# Patient Record
Sex: MALE | Race: WHITE | NOT HISPANIC OR LATINO | Employment: FULL TIME | URBAN - NONMETROPOLITAN AREA
[De-identification: names, ages, dates, MRNs, and addresses within clinical notes are randomized per-mention and may not be internally consistent; named-entity substitution may affect disease eponyms.]

---

## 2023-12-09 ENCOUNTER — HOSPITAL ENCOUNTER (EMERGENCY)
Facility: HOSPITAL | Age: 58
Discharge: OTHER INSTITUTION | End: 2023-12-09
Attending: EMERGENCY MEDICINE
Payer: COMMERCIAL

## 2023-12-09 ENCOUNTER — APPOINTMENT (EMERGENCY)
Dept: RADIOLOGY | Facility: HOSPITAL | Age: 58
End: 2023-12-09
Payer: COMMERCIAL

## 2023-12-09 VITALS
BODY MASS INDEX: 32.18 KG/M2 | OXYGEN SATURATION: 97 % | DIASTOLIC BLOOD PRESSURE: 85 MMHG | SYSTOLIC BLOOD PRESSURE: 128 MMHG | HEART RATE: 88 BPM | RESPIRATION RATE: 15 BRPM | TEMPERATURE: 97.6 F | WEIGHT: 205.03 LBS | HEIGHT: 67 IN

## 2023-12-09 DIAGNOSIS — R07.9 CHEST PAIN, UNSPECIFIED: ICD-10-CM

## 2023-12-09 DIAGNOSIS — R73.9 HYPERGLYCEMIA: ICD-10-CM

## 2023-12-09 DIAGNOSIS — I21.3 STEMI (ST ELEVATION MYOCARDIAL INFARCTION) (HCC): Primary | ICD-10-CM

## 2023-12-09 DIAGNOSIS — I10 HYPERTENSION: ICD-10-CM

## 2023-12-09 PROBLEM — E66.9 OBESITY: Status: ACTIVE | Noted: 2023-12-09

## 2023-12-09 LAB
2HR DELTA HS TROPONIN: 1199 NG/L
ALBUMIN SERPL BCP-MCNC: 4.7 G/DL (ref 3.5–5)
ALP SERPL-CCNC: 82 U/L (ref 34–104)
ALT SERPL W P-5'-P-CCNC: 46 U/L (ref 7–52)
ANION GAP SERPL CALCULATED.3IONS-SCNC: 13 MMOL/L
APTT PPP: 23 SECONDS (ref 23–37)
AST SERPL W P-5'-P-CCNC: 33 U/L (ref 13–39)
BASOPHILS # BLD AUTO: 0.03 THOUSANDS/ÂΜL (ref 0–0.1)
BASOPHILS NFR BLD AUTO: 0 % (ref 0–1)
BILIRUB SERPL-MCNC: 0.81 MG/DL (ref 0.2–1)
BUN SERPL-MCNC: 17 MG/DL (ref 5–25)
CALCIUM SERPL-MCNC: 9.8 MG/DL (ref 8.4–10.2)
CARDIAC TROPONIN I PNL SERPL HS: 1348 NG/L
CARDIAC TROPONIN I PNL SERPL HS: 149 NG/L
CHLORIDE SERPL-SCNC: 96 MMOL/L (ref 96–108)
CO2 SERPL-SCNC: 24 MMOL/L (ref 21–32)
CREAT SERPL-MCNC: 1.27 MG/DL (ref 0.6–1.3)
EOSINOPHIL # BLD AUTO: 0.01 THOUSAND/ÂΜL (ref 0–0.61)
EOSINOPHIL NFR BLD AUTO: 0 % (ref 0–6)
ERYTHROCYTE [DISTWIDTH] IN BLOOD BY AUTOMATED COUNT: 12.4 % (ref 11.6–15.1)
GFR SERPL CREATININE-BSD FRML MDRD: 61 ML/MIN/1.73SQ M
GLUCOSE SERPL-MCNC: 407 MG/DL (ref 65–140)
GLUCOSE SERPL-MCNC: 445 MG/DL (ref 65–140)
HCT VFR BLD AUTO: 45.5 % (ref 36.5–49.3)
HGB BLD-MCNC: 15.6 G/DL (ref 12–17)
IMM GRANULOCYTES # BLD AUTO: 0.04 THOUSAND/UL (ref 0–0.2)
IMM GRANULOCYTES NFR BLD AUTO: 1 % (ref 0–2)
INR PPP: 0.92 (ref 0.84–1.19)
LYMPHOCYTES # BLD AUTO: 0.88 THOUSANDS/ÂΜL (ref 0.6–4.47)
LYMPHOCYTES NFR BLD AUTO: 12 % (ref 14–44)
MCH RBC QN AUTO: 28.8 PG (ref 26.8–34.3)
MCHC RBC AUTO-ENTMCNC: 34.3 G/DL (ref 31.4–37.4)
MCV RBC AUTO: 84 FL (ref 82–98)
MONOCYTES # BLD AUTO: 0.4 THOUSAND/ÂΜL (ref 0.17–1.22)
MONOCYTES NFR BLD AUTO: 5 % (ref 4–12)
NEUTROPHILS # BLD AUTO: 6.15 THOUSANDS/ÂΜL (ref 1.85–7.62)
NEUTS SEG NFR BLD AUTO: 82 % (ref 43–75)
NRBC BLD AUTO-RTO: 0 /100 WBCS
PLATELET # BLD AUTO: 198 THOUSANDS/UL (ref 149–390)
PMV BLD AUTO: 9.9 FL (ref 8.9–12.7)
POTASSIUM SERPL-SCNC: 4.4 MMOL/L (ref 3.5–5.3)
PROT SERPL-MCNC: 7.5 G/DL (ref 6.4–8.4)
PROTHROMBIN TIME: 12.7 SECONDS (ref 11.6–14.5)
RBC # BLD AUTO: 5.41 MILLION/UL (ref 3.88–5.62)
SODIUM SERPL-SCNC: 133 MMOL/L (ref 135–147)
WBC # BLD AUTO: 7.51 THOUSAND/UL (ref 4.31–10.16)

## 2023-12-09 PROCEDURE — 85730 THROMBOPLASTIN TIME PARTIAL: CPT | Performed by: EMERGENCY MEDICINE

## 2023-12-09 PROCEDURE — 93005 ELECTROCARDIOGRAM TRACING: CPT

## 2023-12-09 PROCEDURE — 99285 EMERGENCY DEPT VISIT HI MDM: CPT

## 2023-12-09 PROCEDURE — 85610 PROTHROMBIN TIME: CPT | Performed by: EMERGENCY MEDICINE

## 2023-12-09 PROCEDURE — 96374 THER/PROPH/DIAG INJ IV PUSH: CPT

## 2023-12-09 PROCEDURE — 96375 TX/PRO/DX INJ NEW DRUG ADDON: CPT

## 2023-12-09 PROCEDURE — 36415 COLL VENOUS BLD VENIPUNCTURE: CPT | Performed by: EMERGENCY MEDICINE

## 2023-12-09 PROCEDURE — 96361 HYDRATE IV INFUSION ADD-ON: CPT

## 2023-12-09 PROCEDURE — 85025 COMPLETE CBC W/AUTO DIFF WBC: CPT | Performed by: EMERGENCY MEDICINE

## 2023-12-09 PROCEDURE — 80053 COMPREHEN METABOLIC PANEL: CPT | Performed by: EMERGENCY MEDICINE

## 2023-12-09 PROCEDURE — 71045 X-RAY EXAM CHEST 1 VIEW: CPT

## 2023-12-09 PROCEDURE — 82948 REAGENT STRIP/BLOOD GLUCOSE: CPT

## 2023-12-09 PROCEDURE — 84484 ASSAY OF TROPONIN QUANT: CPT | Performed by: EMERGENCY MEDICINE

## 2023-12-09 PROCEDURE — 99291 CRITICAL CARE FIRST HOUR: CPT | Performed by: EMERGENCY MEDICINE

## 2023-12-09 RX ORDER — HEPARIN SODIUM 1000 [USP'U]/ML
4000 INJECTION, SOLUTION INTRAVENOUS; SUBCUTANEOUS ONCE
Status: COMPLETED | OUTPATIENT
Start: 2023-12-09 | End: 2023-12-09

## 2023-12-09 RX ORDER — SODIUM CHLORIDE 9 MG/ML
INJECTION, SOLUTION INTRAVENOUS
Status: COMPLETED | OUTPATIENT
Start: 2023-12-09 | End: 2023-12-09

## 2023-12-09 RX ORDER — FENTANYL CITRATE 50 UG/ML
100 INJECTION, SOLUTION INTRAMUSCULAR; INTRAVENOUS ONCE
Status: COMPLETED | OUTPATIENT
Start: 2023-12-09 | End: 2023-12-09

## 2023-12-09 RX ORDER — MORPHINE SULFATE 4 MG/ML
4 INJECTION, SOLUTION INTRAMUSCULAR; INTRAVENOUS ONCE
Status: COMPLETED | OUTPATIENT
Start: 2023-12-09 | End: 2023-12-09

## 2023-12-09 RX ORDER — NITROGLYCERIN 0.4 MG/1
0.4 TABLET SUBLINGUAL
Status: DISCONTINUED | OUTPATIENT
Start: 2023-12-09 | End: 2023-12-10 | Stop reason: HOSPADM

## 2023-12-09 RX ORDER — SODIUM CHLORIDE 9 MG/ML
3 INJECTION INTRAVENOUS
Status: DISCONTINUED | OUTPATIENT
Start: 2023-12-09 | End: 2023-12-10 | Stop reason: HOSPADM

## 2023-12-09 RX ORDER — ONDANSETRON 2 MG/ML
4 INJECTION INTRAMUSCULAR; INTRAVENOUS ONCE
Status: COMPLETED | OUTPATIENT
Start: 2023-12-09 | End: 2023-12-09

## 2023-12-09 RX ORDER — ASPIRIN 81 MG/1
324 TABLET, CHEWABLE ORAL ONCE
Status: COMPLETED | OUTPATIENT
Start: 2023-12-09 | End: 2023-12-09

## 2023-12-09 RX ADMIN — NITROGLYCERIN 0.4 MG: 0.4 TABLET SUBLINGUAL at 21:48

## 2023-12-09 RX ADMIN — ASPIRIN 81 MG CHEWABLE TABLET 324 MG: 81 TABLET CHEWABLE at 21:04

## 2023-12-09 RX ADMIN — ONDANSETRON 4 MG: 2 INJECTION INTRAMUSCULAR; INTRAVENOUS at 21:23

## 2023-12-09 RX ADMIN — TICAGRELOR 180 MG: 90 TABLET ORAL at 21:04

## 2023-12-09 RX ADMIN — SODIUM CHLORIDE 1000 ML: 0.9 INJECTION, SOLUTION INTRAVENOUS at 21:25

## 2023-12-09 RX ADMIN — NITROGLYCERIN 0.4 MG: 0.4 TABLET SUBLINGUAL at 21:04

## 2023-12-09 RX ADMIN — HEPARIN SODIUM 4000 UNITS: 1000 INJECTION INTRAVENOUS; SUBCUTANEOUS at 21:03

## 2023-12-09 RX ADMIN — Medication 50 MG: at 22:07

## 2023-12-09 RX ADMIN — FENTANYL CITRATE 100 MCG: 50 INJECTION INTRAMUSCULAR; INTRAVENOUS at 21:28

## 2023-12-09 RX ADMIN — NITROGLYCERIN 0.4 MG: 0.4 TABLET SUBLINGUAL at 21:08

## 2023-12-09 RX ADMIN — MORPHINE SULFATE 4 MG: 4 INJECTION INTRAVENOUS at 21:18

## 2023-12-10 ENCOUNTER — HOSPITAL ENCOUNTER (INPATIENT)
Facility: HOSPITAL | Age: 58
LOS: 2 days | Discharge: HOME | DRG: 281 | End: 2023-12-12
Attending: EMERGENCY MEDICINE | Admitting: EMERGENCY MEDICINE
Payer: COMMERCIAL

## 2023-12-10 ENCOUNTER — APPOINTMENT (INPATIENT)
Dept: NON INVASIVE DIAGNOSTICS | Facility: HOSPITAL | Age: 58
DRG: 281 | End: 2023-12-10
Payer: COMMERCIAL

## 2023-12-10 DIAGNOSIS — I21.3 STEMI (ST ELEVATION MYOCARDIAL INFARCTION) (HCC): Primary | ICD-10-CM

## 2023-12-10 DIAGNOSIS — E11.69 TYPE 2 DIABETES MELLITUS WITH OTHER SPECIFIED COMPLICATION, WITHOUT LONG-TERM CURRENT USE OF INSULIN (HCC): ICD-10-CM

## 2023-12-10 DIAGNOSIS — E11.9 NEWLY DIAGNOSED DIABETES (HCC): ICD-10-CM

## 2023-12-10 PROBLEM — R74.01 TRANSAMINITIS: Status: ACTIVE | Noted: 2023-12-10

## 2023-12-10 LAB
ALBUMIN SERPL BCP-MCNC: 3.8 G/DL (ref 3.5–5)
ALP SERPL-CCNC: 67 U/L (ref 34–104)
ALT SERPL W P-5'-P-CCNC: 62 U/L (ref 7–52)
ANION GAP SERPL CALCULATED.3IONS-SCNC: 6 MMOL/L
ANION GAP SERPL CALCULATED.3IONS-SCNC: 6 MMOL/L
AORTIC ROOT: 3.4 CM
APICAL FOUR CHAMBER EJECTION FRACTION: 62 %
APTT PPP: 34 SECONDS (ref 23–37)
ASCENDING AORTA: 3.2 CM
AST SERPL W P-5'-P-CCNC: 165 U/L (ref 13–39)
ATRIAL RATE: 75 BPM
ATRIAL RATE: 99 BPM
BASOPHILS # BLD AUTO: 0.02 THOUSANDS/ÂΜL (ref 0–0.1)
BASOPHILS NFR BLD AUTO: 0 % (ref 0–1)
BILIRUB SERPL-MCNC: 0.66 MG/DL (ref 0.2–1)
BUN SERPL-MCNC: 16 MG/DL (ref 5–25)
BUN SERPL-MCNC: 17 MG/DL (ref 5–25)
CA-I BLD-SCNC: 1.15 MMOL/L (ref 1.12–1.32)
CALCIUM SERPL-MCNC: 8.7 MG/DL (ref 8.4–10.2)
CALCIUM SERPL-MCNC: 8.8 MG/DL (ref 8.4–10.2)
CARDIAC TROPONIN I PNL SERPL HS: ABNORMAL NG/L
CHLORIDE SERPL-SCNC: 101 MMOL/L (ref 96–108)
CHLORIDE SERPL-SCNC: 104 MMOL/L (ref 96–108)
CHOLEST SERPL-MCNC: 209 MG/DL
CO2 SERPL-SCNC: 28 MMOL/L (ref 21–32)
CO2 SERPL-SCNC: 29 MMOL/L (ref 21–32)
CREAT SERPL-MCNC: 0.97 MG/DL (ref 0.6–1.3)
CREAT SERPL-MCNC: 1.1 MG/DL (ref 0.6–1.3)
E WAVE DECELERATION TIME: 212 MS
E/A RATIO: 0.89
EOSINOPHIL # BLD AUTO: 0 THOUSAND/ÂΜL (ref 0–0.61)
EOSINOPHIL NFR BLD AUTO: 0 % (ref 0–6)
ERYTHROCYTE [DISTWIDTH] IN BLOOD BY AUTOMATED COUNT: 12.7 % (ref 11.6–15.1)
EST. AVERAGE GLUCOSE BLD GHB EST-MCNC: 355 MG/DL
FRACTIONAL SHORTENING: 29 (ref 28–44)
GFR SERPL CREATININE-BSD FRML MDRD: 73 ML/MIN/1.73SQ M
GFR SERPL CREATININE-BSD FRML MDRD: 85 ML/MIN/1.73SQ M
GLUCOSE SERPL-MCNC: 108 MG/DL (ref 65–140)
GLUCOSE SERPL-MCNC: 137 MG/DL (ref 65–140)
GLUCOSE SERPL-MCNC: 140 MG/DL (ref 65–140)
GLUCOSE SERPL-MCNC: 146 MG/DL (ref 65–140)
GLUCOSE SERPL-MCNC: 154 MG/DL (ref 65–140)
GLUCOSE SERPL-MCNC: 170 MG/DL (ref 65–140)
GLUCOSE SERPL-MCNC: 189 MG/DL (ref 65–140)
GLUCOSE SERPL-MCNC: 219 MG/DL (ref 65–140)
GLUCOSE SERPL-MCNC: 237 MG/DL (ref 65–140)
GLUCOSE SERPL-MCNC: 283 MG/DL (ref 65–140)
GLUCOSE SERPL-MCNC: 294 MG/DL (ref 65–140)
GLUCOSE SERPL-MCNC: 336 MG/DL (ref 65–140)
GLUCOSE SERPL-MCNC: 368 MG/DL (ref 65–140)
GLUCOSE SERPL-MCNC: 423 MG/DL (ref 65–140)
HBA1C MFR BLD: 14 %
HCT VFR BLD AUTO: 39.1 % (ref 36.5–49.3)
HDLC SERPL-MCNC: 35 MG/DL
HGB BLD-MCNC: 13.5 G/DL (ref 12–17)
IMM GRANULOCYTES # BLD AUTO: 0.02 THOUSAND/UL (ref 0–0.2)
IMM GRANULOCYTES NFR BLD AUTO: 0 % (ref 0–2)
INTERVENTRICULAR SEPTUM IN DIASTOLE (PARASTERNAL SHORT AXIS VIEW): 0.9 CM
INTERVENTRICULAR SEPTUM: 0.9 CM (ref 0.6–1.1)
LAAS-AP2: 19 CM2
LAAS-AP4: 18.5 CM2
LDLC SERPL CALC-MCNC: 113 MG/DL (ref 0–100)
LEFT ATRIUM SIZE: 3.3 CM
LEFT ATRIUM VOLUME (MOD BIPLANE): 52 ML
LEFT ATRIUM VOLUME INDEX (MOD BIPLANE): 25.5 ML/M2
LEFT INTERNAL DIMENSION IN SYSTOLE: 3.4 CM (ref 2.1–4)
LEFT VENTRICULAR INTERNAL DIMENSION IN DIASTOLE: 4.8 CM (ref 3.5–6)
LEFT VENTRICULAR POSTERIOR WALL IN END DIASTOLE: 1 CM
LEFT VENTRICULAR STROKE VOLUME: 61 ML
LVSV (TEICH): 61 ML
LYMPHOCYTES # BLD AUTO: 0.94 THOUSANDS/ÂΜL (ref 0.6–4.47)
LYMPHOCYTES NFR BLD AUTO: 11 % (ref 14–44)
MAGNESIUM SERPL-MCNC: 1.9 MG/DL (ref 1.9–2.7)
MAGNESIUM SERPL-MCNC: 2.1 MG/DL (ref 1.9–2.7)
MCH RBC QN AUTO: 29.8 PG (ref 26.8–34.3)
MCHC RBC AUTO-ENTMCNC: 34.5 G/DL (ref 31.4–37.4)
MCV RBC AUTO: 86 FL (ref 82–98)
MONOCYTES # BLD AUTO: 0.58 THOUSAND/ÂΜL (ref 0.17–1.22)
MONOCYTES NFR BLD AUTO: 7 % (ref 4–12)
MV E'TISSUE VEL-SEP: 6 CM/S
MV PEAK A VEL: 0.72 M/S
MV PEAK E VEL: 64 CM/S
MV STENOSIS PRESSURE HALF TIME: 61 MS
MV VALVE AREA P 1/2 METHOD: 3.61
NEUTROPHILS # BLD AUTO: 6.76 THOUSANDS/ÂΜL (ref 1.85–7.62)
NEUTS SEG NFR BLD AUTO: 82 % (ref 43–75)
NRBC BLD AUTO-RTO: 0 /100 WBCS
P AXIS: 65 DEGREES
P AXIS: 65 DEGREES
PHOSPHATE SERPL-MCNC: 4.6 MG/DL (ref 2.7–4.5)
PLATELET # BLD AUTO: 189 THOUSANDS/UL (ref 149–390)
PMV BLD AUTO: 9.7 FL (ref 8.9–12.7)
POTASSIUM SERPL-SCNC: 3.2 MMOL/L (ref 3.5–5.3)
POTASSIUM SERPL-SCNC: 4.5 MMOL/L (ref 3.5–5.3)
PR INTERVAL: 154 MS
PR INTERVAL: 156 MS
PROT SERPL-MCNC: 6 G/DL (ref 6.4–8.4)
QRS AXIS: -54 DEGREES
QRS AXIS: -60 DEGREES
QRSD INTERVAL: 78 MS
QRSD INTERVAL: 88 MS
QT INTERVAL: 342 MS
QT INTERVAL: 362 MS
QTC INTERVAL: 404 MS
QTC INTERVAL: 438 MS
RBC # BLD AUTO: 4.53 MILLION/UL (ref 3.88–5.62)
RIGHT ATRIUM AREA SYSTOLE A4C: 15.6 CM2
RIGHT VENTRICLE ID DIMENSION: 3.7 CM
SL CV LEFT ATRIUM LENGTH A2C: 5.4 CM
SL CV LV EF: 55
SL CV PED ECHO LEFT VENTRICLE DIASTOLIC VOLUME (MOD BIPLANE) 2D: 108 ML
SL CV PED ECHO LEFT VENTRICLE SYSTOLIC VOLUME (MOD BIPLANE) 2D: 47 ML
SODIUM SERPL-SCNC: 135 MMOL/L (ref 135–147)
SODIUM SERPL-SCNC: 139 MMOL/L (ref 135–147)
T WAVE AXIS: 111 DEGREES
T WAVE AXIS: 88 DEGREES
TR MAX PG: 22 MMHG
TR PEAK VELOCITY: 2.3 M/S
TRICUSPID VALVE PEAK REGURGITATION VELOCITY: 2.33 M/S
TRIGL SERPL-MCNC: 306 MG/DL
VENTRICULAR RATE: 75 BPM
VENTRICULAR RATE: 99 BPM
WBC # BLD AUTO: 8.32 THOUSAND/UL (ref 4.31–10.16)

## 2023-12-10 PROCEDURE — 99223 1ST HOSP IP/OBS HIGH 75: CPT | Performed by: INTERNAL MEDICINE

## 2023-12-10 PROCEDURE — 80061 LIPID PANEL: CPT | Performed by: PHYSICIAN ASSISTANT

## 2023-12-10 PROCEDURE — 85025 COMPLETE CBC W/AUTO DIFF WBC: CPT | Performed by: PHYSICIAN ASSISTANT

## 2023-12-10 PROCEDURE — 80053 COMPREHEN METABOLIC PANEL: CPT | Performed by: PHYSICIAN ASSISTANT

## 2023-12-10 PROCEDURE — 83735 ASSAY OF MAGNESIUM: CPT | Performed by: PHYSICIAN ASSISTANT

## 2023-12-10 PROCEDURE — 83036 HEMOGLOBIN GLYCOSYLATED A1C: CPT | Performed by: PHYSICIAN ASSISTANT

## 2023-12-10 PROCEDURE — 99223 1ST HOSP IP/OBS HIGH 75: CPT | Performed by: EMERGENCY MEDICINE

## 2023-12-10 PROCEDURE — 80048 BASIC METABOLIC PNL TOTAL CA: CPT | Performed by: PHYSICIAN ASSISTANT

## 2023-12-10 PROCEDURE — 93005 ELECTROCARDIOGRAM TRACING: CPT

## 2023-12-10 PROCEDURE — 82948 REAGENT STRIP/BLOOD GLUCOSE: CPT

## 2023-12-10 PROCEDURE — 84100 ASSAY OF PHOSPHORUS: CPT | Performed by: PHYSICIAN ASSISTANT

## 2023-12-10 PROCEDURE — 82330 ASSAY OF CALCIUM: CPT | Performed by: PHYSICIAN ASSISTANT

## 2023-12-10 PROCEDURE — 84484 ASSAY OF TROPONIN QUANT: CPT | Performed by: PHYSICIAN ASSISTANT

## 2023-12-10 PROCEDURE — 93306 TTE W/DOPPLER COMPLETE: CPT | Performed by: INTERNAL MEDICINE

## 2023-12-10 PROCEDURE — 85730 THROMBOPLASTIN TIME PARTIAL: CPT | Performed by: INTERNAL MEDICINE

## 2023-12-10 PROCEDURE — 93306 TTE W/DOPPLER COMPLETE: CPT

## 2023-12-10 RX ORDER — POTASSIUM CHLORIDE 20 MEQ/1
40 TABLET, EXTENDED RELEASE ORAL ONCE
Status: COMPLETED | OUTPATIENT
Start: 2023-12-10 | End: 2023-12-10

## 2023-12-10 RX ORDER — PANTOPRAZOLE SODIUM 20 MG/1
20 TABLET, DELAYED RELEASE ORAL
Status: DISCONTINUED | OUTPATIENT
Start: 2023-12-10 | End: 2023-12-12 | Stop reason: HOSPADM

## 2023-12-10 RX ORDER — ONDANSETRON 2 MG/ML
4 INJECTION INTRAMUSCULAR; INTRAVENOUS EVERY 6 HOURS PRN
Status: DISCONTINUED | OUTPATIENT
Start: 2023-12-10 | End: 2023-12-12 | Stop reason: HOSPADM

## 2023-12-10 RX ORDER — CHLORHEXIDINE GLUCONATE ORAL RINSE 1.2 MG/ML
15 SOLUTION DENTAL EVERY 12 HOURS SCHEDULED
Status: DISCONTINUED | OUTPATIENT
Start: 2023-12-10 | End: 2023-12-12 | Stop reason: HOSPADM

## 2023-12-10 RX ORDER — HEPARIN SODIUM 10000 [USP'U]/100ML
3-20 INJECTION, SOLUTION INTRAVENOUS
Status: DISCONTINUED | OUTPATIENT
Start: 2023-12-10 | End: 2023-12-11

## 2023-12-10 RX ORDER — ATORVASTATIN CALCIUM 80 MG/1
80 TABLET, FILM COATED ORAL
Status: DISCONTINUED | OUTPATIENT
Start: 2023-12-10 | End: 2023-12-12 | Stop reason: HOSPADM

## 2023-12-10 RX ORDER — ACETAMINOPHEN 325 MG/1
650 TABLET ORAL EVERY 6 HOURS PRN
Status: DISCONTINUED | OUTPATIENT
Start: 2023-12-10 | End: 2023-12-12 | Stop reason: HOSPADM

## 2023-12-10 RX ORDER — MAGNESIUM SULFATE HEPTAHYDRATE 40 MG/ML
2 INJECTION, SOLUTION INTRAVENOUS ONCE
Status: COMPLETED | OUTPATIENT
Start: 2023-12-10 | End: 2023-12-10

## 2023-12-10 RX ORDER — HEPARIN SODIUM 1000 [USP'U]/ML
4000 INJECTION, SOLUTION INTRAVENOUS; SUBCUTANEOUS EVERY 6 HOURS PRN
Status: DISCONTINUED | OUTPATIENT
Start: 2023-12-10 | End: 2023-12-11

## 2023-12-10 RX ORDER — HEPARIN SODIUM 1000 [USP'U]/ML
2000 INJECTION, SOLUTION INTRAVENOUS; SUBCUTANEOUS EVERY 6 HOURS PRN
Status: DISCONTINUED | OUTPATIENT
Start: 2023-12-10 | End: 2023-12-11

## 2023-12-10 RX ORDER — HEPARIN SODIUM 1000 [USP'U]/ML
4000 INJECTION, SOLUTION INTRAVENOUS; SUBCUTANEOUS ONCE
Status: COMPLETED | OUTPATIENT
Start: 2023-12-10 | End: 2023-12-10

## 2023-12-10 RX ORDER — AMOXICILLIN 250 MG
2 CAPSULE ORAL 2 TIMES DAILY
Status: DISCONTINUED | OUTPATIENT
Start: 2023-12-10 | End: 2023-12-12 | Stop reason: HOSPADM

## 2023-12-10 RX ORDER — POTASSIUM CHLORIDE 14.9 MG/ML
20 INJECTION INTRAVENOUS ONCE
Status: COMPLETED | OUTPATIENT
Start: 2023-12-10 | End: 2023-12-10

## 2023-12-10 RX ADMIN — AMIODARONE HYDROCHLORIDE 150 MG: 50 INJECTION, SOLUTION INTRAVENOUS at 08:53

## 2023-12-10 RX ADMIN — Medication 12.5 MG: at 08:47

## 2023-12-10 RX ADMIN — CHLORHEXIDINE GLUCONATE 15 ML: 1.2 SOLUTION ORAL at 01:51

## 2023-12-10 RX ADMIN — HEPARIN SODIUM 4000 UNITS: 1000 INJECTION INTRAVENOUS; SUBCUTANEOUS at 11:35

## 2023-12-10 RX ADMIN — SODIUM CHLORIDE 10 UNITS/HR: 9 INJECTION, SOLUTION INTRAVENOUS at 01:51

## 2023-12-10 RX ADMIN — POTASSIUM CHLORIDE 20 MEQ: 14.9 INJECTION, SOLUTION INTRAVENOUS at 16:04

## 2023-12-10 RX ADMIN — SENNOSIDES, DOCUSATE SODIUM 2 TABLET: 8.6; 5 TABLET ORAL at 08:19

## 2023-12-10 RX ADMIN — TICAGRELOR 90 MG: 90 TABLET ORAL at 21:23

## 2023-12-10 RX ADMIN — POTASSIUM CHLORIDE 40 MEQ: 1500 TABLET, EXTENDED RELEASE ORAL at 16:04

## 2023-12-10 RX ADMIN — HEPARIN SODIUM 11.1 UNITS/KG/HR: 10000 INJECTION, SOLUTION INTRAVENOUS at 11:36

## 2023-12-10 RX ADMIN — CHLORHEXIDINE GLUCONATE 15 ML: 1.2 SOLUTION ORAL at 21:22

## 2023-12-10 RX ADMIN — TICAGRELOR 90 MG: 90 TABLET ORAL at 08:19

## 2023-12-10 RX ADMIN — ASPIRIN 81 MG: 81 TABLET, COATED ORAL at 08:19

## 2023-12-10 RX ADMIN — SENNOSIDES, DOCUSATE SODIUM 2 TABLET: 8.6; 5 TABLET ORAL at 17:53

## 2023-12-10 RX ADMIN — PANTOPRAZOLE SODIUM 20 MG: 20 TABLET, DELAYED RELEASE ORAL at 16:04

## 2023-12-10 RX ADMIN — SODIUM CHLORIDE 4 UNITS/HR: 9 INJECTION, SOLUTION INTRAVENOUS at 12:55

## 2023-12-10 RX ADMIN — HEPARIN SODIUM 4000 UNITS: 1000 INJECTION INTRAVENOUS; SUBCUTANEOUS at 18:56

## 2023-12-10 RX ADMIN — ATORVASTATIN CALCIUM 80 MG: 80 TABLET, FILM COATED ORAL at 16:04

## 2023-12-10 RX ADMIN — MAGNESIUM SULFATE HEPTAHYDRATE 2 G: 40 INJECTION, SOLUTION INTRAVENOUS at 05:20

## 2023-12-10 RX ADMIN — CHLORHEXIDINE GLUCONATE 15 ML: 1.2 SOLUTION ORAL at 08:32

## 2023-12-10 RX ADMIN — ACETAMINOPHEN 650 MG: 325 TABLET, FILM COATED ORAL at 10:17

## 2023-12-10 NOTE — CASE MANAGEMENT
Case Management Assessment & Discharge Planning Note    Patient name Ane Records  Location [address] [place] [address] [address]/[facility] 423-55 MRN [id number]  : 1965 Date 12/10/2023       Current Admission Date: 12/10/2023  Current Admission Diagnosis:STEMI (ST elevation myocardial infarction) [facility])   Patient Active Problem List    Diagnosis Date Noted    Transaminitis 12/10/2023    STEMI (ST elevation myocardial infarction) ([address]) 2023    HTN (hypertension) 2023    Obesity 2023    Hyperglycemia 2023      LOS (days): 0  Geometric Mean LOS (GMLOS) (days):   Days to GMLOS:     OBJECTIVE:    Risk of Unplanned Readmission Score: 9.15         Current admission status: Inpatient       Preferred Pharmacy: No Pharmacies Listed  Primary Care Provider: Arlene Carranza MD    Primary Insurance:   Secondary Insurance:     ASSESSMENT:  [place] Proxies    There are no active Health Care Proxies on file. Advance Directives  Does patient have a Health Care POA?: Yes  Does patient have Advance Directives?: Yes  Advance Directives: Living will  Primary Contact: wife Ronald Montero              Patient Information  Admitted from[de-identified] Home  Mental Status: Alert  During Assessment patient was accompanied by: Spouse  Assessment information provided by[de-identified] Patient  Primary Caregiver: Self  Support Systems: Spouse/significant other, Family members  Home entry access options.  Select all that apply.: Stairs  Number of steps to enter home.: 3  Type of Current Residence: [facility]  Living Arrangements: Lives w/ Spouse/significant other    Activities of Daily Living Prior to Admission  Functional Status: Independent  Completes ADLs independently?: Yes  Ambulates independently?: Yes  Does patient use assisted devices?: No  Does patient currently own DME?: No  Does patient have a history of Outpatient Therapy (PT/OT)?: No  Does the patient have a history of Short-Term Rehab?: No  Does patient have a history of HHC?: No         Patient Information Continued  Income Source: Employed  Does patient have prescription coverage?: Yes  Does patient receive dialysis treatments?: No  Does patient have a history of substance abuse?: No  Does patient have a history of Mental Health Diagnosis?: No         Means of Transportation  Means of Transport to Appts[de-identified] Drives Self      Housing Stability: Not on file   Food Insecurity: Not on file   Transportation Needs: Not on file   Utilities: Not on file       DISCHARGE DETAILS:    Discharge planning discussed with[de-identified] pt  Freedom of Choice: Yes                   Contacts  Patient Contacts: wife Jesus Luong  Relationship to Patient[de-identified] Family  Contact Method: Phone  Phone Number: [phone]  Reason/Outcome: Continuity of Care, Emergency Contact, Discharge Planning                        Treatment Team Recommendation: Home

## 2023-12-10 NOTE — ASSESSMENT & PLAN NOTE
Patient presents to [address] [place] [address] ED with 3 hours of persistent substernal chest pain. Associated with left arm pain.  No SOB, dizziness or palpitations  Found to have inferior ST elevations  MI alert called but due to lack of transpiration, patient was unable to be transferred to cath lab in timely manner  TNK administered with plans for transfer to [facility]  After TNK, patient had relief of his pain    Plan:  ASA and Brilinta  Statin  ECHO  Cardiology consult  Eventual cardiac cath

## 2023-12-10 NOTE — ASSESSMENT & PLAN NOTE
BG >400 on arrival to ED  Check Hgb A1C   Insulin infusion for now  Likely will need home regimen and endocrine consult

## 2023-12-10 NOTE — CONSULTS
Consultation - Cardiology   Frantz Hicks 60 y.o. male MRN: [id number]  Unit/Bed#: [facility] 778-45 Encounter: [id number]        Inpatient consult to Cardiology     Date/Time  12/10/2023 1:07 AM     Performed by  Claudia Beach MD   Authorized by  Arturo Crowe PA-C             History of Present Illness   Physician Requesting Consult: Jj Aparicio DO  Reason for Consult / Principal Problem: STEMI      Assessment:  Principal Problem:    STEMI (ST elevation myocardial infarction) ([address])  Active Problems:    HTN (hypertension)    Obesity    Hyperglycemia      Assessment/ Plan:    Inferior STEMI  Presentation with 3 hours of chest pain onset with exertion (4 episodes in the last week)  No known risk factors, likely undiagnosed DM, HTN, HLD. Quit smoking >20 years back, occasional alcohol use, no family hx of CAD  EKG presenting with ST elevations in inferior leads  TNK due to transportation delay  Post TNK chest pain has completely resolved, EKG with improvement of ST elevations >50% (see below), with Q waves    Hyperlipidemia  hyperglycemia    Plan  Continue aspirin and ticagrelor  Continue atorvastatin 80 mg daily  Transthoracic echocardiogram  Start lopressor 12.5 mg bid  Can start ACEi/ARB post cardiac cath  Given resolution of chest pain, with >50% improvement of ST elevations in post TNK EKG (with Q waves), and hemodynamic stability, no urgent indication for LHC now. He will need LHC during this admission  Hyperglycemia management per primary  Check lipid panel, HBA1C  Check renal functions, electrolytes    HPI: Frantz Hicks is a 60y.o. year old male with no known medical history, missed PCP visit for 2 years, presented initially to [address] [place] [address] with chest pain. States over the last week he had 4 similar episodes. The last episode is when he had returned from hunting today and was walking up the stairs from his basement. He had a sudden, retrosternal pain, severe, lasted >3 hours.  On arrival to [address] [place] [address] ED EKG showed ST elevations in inferior leads. STEMI alert was called, however given delay in transport, TNK was administered with resolution of his pain. Post TNK EKG shows >50% improvement of his ST elevations. Currently patient denies chest pain, shortness of breath, palpitations. No orthopnea, PND, lower extremity swelling. EKG on presentation      Post TNK         Review of Systems   Constitutional: Negative for decreased appetite, fever, malaise/fatigue and weight gain. Eyes: Negative. Cardiovascular:  Positive for chest pain (now resolved). Negative for dyspnea on exertion, leg swelling, near-syncope and orthopnea. Respiratory: Negative. Negative for shortness of breath. Neurological: Negative. All other systems reviewed and are negative. Historical Information   No past medical history on file. No past surgical history on file. Social History     Substance and Sexual Activity   Alcohol Use Not Currently     Social History     Substance and Sexual Activity   Drug Use Never     Social History     Tobacco Use   Smoking Status Never   Smokeless Tobacco Never     Family History: No family history on file. Meds/Allergies   all current active meds have been reviewed  insulin regular (HumuLIN R,NovoLIN R) 1 Units/mL in sodium chloride 0.9 % 100 mL infusion, 0.3-21 Units/hr        No Known Allergies    Objective   Vitals: Blood pressure 126/80, pulse 99, temperature 97.8 °F (36.6 °C), weight 92.3 kg (203 lb 7.8 oz), SpO2 97 %. , Body mass index is 31.87 kg/m².,   Orthostatic Blood Pressures      Flowsheet Row Most Recent Value   Blood Pressure 126/80 filed at 12/10/2023 5972          Systolic (78ZLF), KQJ:882 , Min:126 , HCE:510     Diastolic (00HLA), SAM:54, Min:80, Max:80    No intake or output data in the 24 hours ending 12/10/23 0106    Weight (last 2 days)       Date/Time Weight    12/10/23 0011 92.3 (203.48)            Invasive Devices       Peripheral Intravenous Line  Duration Peripheral IV 12/09/23 Left Antecubital <1 day    Peripheral IV 12/09/23 Right Antecubital <1 day              Drain  Duration             Urethral Catheter 16 Fr. <1 day                        Physical Exam:   Physical Exam  Vitals and nursing note reviewed. Constitutional:       General: He is not in acute distress. Appearance: He is not ill-appearing. HENT:      Head: Normocephalic. Cardiovascular:      Rate and Rhythm: Normal rate and regular rhythm. Pulses: Normal pulses. Heart sounds: No murmur heard. Pulmonary:      Effort: Pulmonary effort is normal.      Breath sounds: No rales. Abdominal:      Palpations: Abdomen is soft. Musculoskeletal:      Cervical back: Normal range of motion. Right lower leg: No edema. Left lower leg: No edema. Skin:     General: Skin is warm. Capillary Refill: Capillary refill takes less than 2 seconds. Neurological:      Mental Status: He is alert and oriented to person, place, and time. Laboratory Results:        CBC with diff:   Results from last 7 days   Lab Units 12/09/23  2102   WBC Thousand/uL 7.51   HEMOGLOBIN g/dL 15.6   HEMATOCRIT % 45.5   MCV fL 84   PLATELETS Thousands/uL 198   RBC Million/uL 5.41   MCH pg 28.8   MCHC g/dL 34.3   RDW % 12.4   MPV fL 9.9   NRBC AUTO /100 WBCs 0         CMP:  Results from last 7 days   Lab Units 12/09/23  2102   POTASSIUM mmol/L 4.4   CHLORIDE mmol/L 96   CO2 mmol/L 24   BUN mg/dL 17   CREATININE mg/dL 1.27   CALCIUM mg/dL 9.8   AST U/L 33   ALT U/L 46   ALK PHOS U/L 82   EGFR ml/min/1.73sq m 61         BMP:  Results from last 7 days   Lab Units 12/09/23  2102   POTASSIUM mmol/L 4.4   CHLORIDE mmol/L 96   CO2 mmol/L 24   BUN mg/dL 17   CREATININE mg/dL 1.27   CALCIUM mg/dL 9.8       BNP:  No results for input(s): "BNP" in the last 72 hours.     Magnesium:       Coags:   Results from last 7 days   Lab Units 12/09/23  2102   PTT seconds 23   INR  0.92       TSH:       Hemoglobin A1C Lipid Profile:         Cardiac testing:   No results found for this or any previous visit. No results found for this or any previous visit. No results found for this or any previous visit. No results found for this or any previous visit. Imaging: I have personally reviewed pertinent reports. No results found.     EKG reviewed personally: ST elevations in Inferior leads  Telemetry reviewed personally: NSR         Code Status: Level 1 - Full Code

## 2023-12-10 NOTE — H&P
[address] [facility]  H&P  Name: Geovani Mandujano 60 y.o. male I MRN: [id number]  Unit/Bed#: [facility] 249-49 I Date of Admission: 12/10/2023   Date of Service: 12/10/2023 I Hospital Day: 0      Assessment/Plan   * STEMI (ST elevation myocardial infarction) [facility])  Assessment & Plan  Patient presents to [facility] ED with 3 hours of persistent substernal chest pain. Associated with left arm pain. No SOB, dizziness or palpitations  Found to have inferior ST elevations  MI alert called but due to lack of transpiration, patient was unable to be transferred to cath lab in timely manner  TNK administered with plans for transfer to [facility]  After TNK, patient had relief of his pain    Plan:  ASA and Brilinta  Statin  ECHO  Cardiology consult  Eventual cardiac cath    HTN (hypertension)  Assessment & Plan  Patient with a history of HTN  Not taking any home medications  Monitor BP and start BB when able  Goal SBP <130    Obesity  Assessment & Plan  Nutrition counseling  BMI >30    Hyperglycemia  Assessment & Plan  BG >400 on arrival to ED  Check Hgb A1C   Insulin infusion for now  Likely will need home regimen and endocrine consult         History of Present Illness     HPI: Geovani Mandujano is a 60 y.o. who presents to [facility] emergency department approximately 3 hours of substernal chest pressure with associated left arm pain. Patient states over the last week has had 4 episodes of substernal chest pressure that typically resolves within 15 minutes. Due to the long duration of this episode, he presented to the emergency department. Patient states at baseline, he is able to perform his activities of daily living including walking up a flight of stairs without chest pain. He denied associated shortness of breath palpitations or dizziness. Patient received fentanyl, morphine and nitroglycerin in the emergency department without relief of his pain.   An MI alert was called but unfortunately transport was unable to be arranged in a timely manner and ultimately the patient was given TNK with resolution of his symptoms. He was transferred to [facility] for cardiac evaluation. The time my examination the patient is resting comfortably in bed without any complaints. History obtained from the patient. Review of Systems   Constitutional:  Negative for diaphoresis. Respiratory:  Negative for shortness of breath. Cardiovascular:  Positive for chest pain. Negative for palpitations. Neurological:  Negative for dizziness. All other systems reviewed and are negative. Disposition: Critical care  Historical Information   No past medical history on file. No past surgical history on file. No current outpatient medications No Known Allergies   Social History     Tobacco Use    Smoking status: Never    Smokeless tobacco: Never   Substance Use Topics    Alcohol use: Not Currently    Drug use: Never    No family history on file. Objective                            Vitals I/O      Most Recent Min/Max in 24hrs   Temp 97.8 °F (36.6 °C) Temp  Min: 97.8 °F (36.6 °C)  Max: 97.8 °F (36.6 °C)   Pulse 99 Pulse  Min: 99  Max: 99   Resp   No data recorded   /80 BP  Min: 126/80  Max: 126/80   O2 Sat 97 % SpO2  Min: 97 %  Max: 97 %    No intake or output data in the 24 hours ending 12/10/23 0038    Diet Clear Liquid    Invasive Monitoring           Physical Exam   Physical Exam  Skin:     General: Skin is warm and dry. Cardiovascular:      Rate and Rhythm: Normal rate and regular rhythm. Heart sounds: No murmur heard. No friction rub. Constitutional:       General: He is awake. He is not in acute distress. Appearance: He is well-developed. Pulmonary:      Effort: Pulmonary effort is normal.      Breath sounds: Normal breath sounds. No wheezing, rhonchi or rales. Neurological:      General: No focal deficit present.       Mental Status: He is alert and oriented to person, place and time. Mental status is at baseline. Genitourinary/Anorectal:  Red present. Diagnostic Studies      EKG: Inferior ST elevations- now resolved.       Medications:  Scheduled PRN   aspirin, 81 mg, Daily  atorvastatin, 80 mg, Daily With Dinner  chlorhexidine, 15 mL, Q12H [address]  senna-docusate sodium, 2 tablet, BID  ticagrelor, 90 mg, Q12H [place]      acetaminophen, 650 mg, Q6H PRN  ondansetron, 4 mg, Q6H PRN       Continuous    insulin regular (HumuLIN R,NovoLIN R) 1 Units/mL in sodium chloride 0.9 % 100 mL infusion, 0.3-21 Units/hr         Labs:    CBC    Recent Labs     12/09/23 2102   WBC 7.51   HGB 15.6   HCT 45.5        BMP    Recent Labs     12/09/23 2102   SODIUM 133*   K 4.4   CL 96   CO2 24   AGAP 13   BUN 17   CREATININE 1.27   CALCIUM 9.8       Coags    Recent Labs     12/09/23 2102   INR 0.92   PTT 23        Additional Electrolytes  No recent results       Blood Gas    No recent results  No recent results LFTs  Recent Labs     12/09/23 2102   ALT 46   AST 33   ALKPHOS 82   ALB 4.7   TBILI 0.81       Infectious  No recent results  Glucose  Recent Labs     12/09/23 2102   GLUC 445*           Anticipated Length of Stay is > 2 midnights  Maria T Gong PA-C

## 2023-12-10 NOTE — ED PROVIDER NOTES
History  Chief Complaint   Patient presents with    Chest Pain     " It feels like theres a baseball hitting my chest". Pt reports this is the 4th episode of chest pain this week       History provided by:  Medical records, patient and spouse  Chest Pain  Pain location:  Substernal area  Pain quality: pressure and tightness    Pain radiates to:  Does not radiate  Pain radiates to the back: no    Pain severity:  Moderate  Onset quality:  Gradual  Duration:  3 hours  Timing:  Constant  Progression:  Unchanged  Chronicity:  New  Context comment:  Patient states he was experiencing central chest pressure 4 times over the past week they would last for about 15 minutes, resolved without intervention. Tonight he had pain that started 3 hours ago and has been constant. Relieved by:  Nothing  Worsened by:  Nothing tried  Ineffective treatments:  None tried  Associated symptoms: no abdominal pain, no back pain, no cough, no dizziness, no dysphagia, no fatigue, no fever, no headache, no nausea, no palpitations, no shortness of breath, not vomiting and no weakness    Risk factors: smoking    Risk factors comment:  History of hypertension, not taking medications, remote smoker quit 26 years ago      None       History reviewed. No pertinent past medical history. History reviewed. No pertinent surgical history. History reviewed. No pertinent family history. I have reviewed and agree with the history as documented. E-Cigarette/Vaping     E-Cigarette/Vaping Substances     Social History     Tobacco Use    Smoking status: Never    Smokeless tobacco: Never   Substance Use Topics    Alcohol use: Not Currently    Drug use: Never       Review of Systems   Constitutional:  Negative for appetite change, chills, fatigue and fever. HENT:  Negative for ear pain, rhinorrhea, sore throat and trouble swallowing. Eyes:  Negative for pain, discharge and visual disturbance.    Respiratory:  Negative for cough, chest tightness and shortness of breath. Cardiovascular:  Positive for chest pain. Negative for palpitations. Gastrointestinal:  Negative for abdominal pain, nausea and vomiting. Endocrine: Negative for polydipsia, polyphagia and polyuria. Genitourinary:  Negative for difficulty urinating, dysuria, hematuria and testicular pain. Musculoskeletal:  Negative for arthralgias and back pain. Skin:  Negative for color change and rash. Allergic/Immunologic: Negative for immunocompromised state. Neurological:  Negative for dizziness, seizures, syncope, weakness and headaches. Hematological:  Negative for adenopathy. Psychiatric/Behavioral:  Negative for confusion and dysphoric mood. All other systems reviewed and are negative. Physical Exam  Physical Exam  Vitals and nursing note reviewed. Constitutional:       General: He is not in acute distress. Appearance: Normal appearance. He is well-developed. He is not ill-appearing, toxic-appearing or diaphoretic. Comments: Anxious appearing   HENT:      Head: Normocephalic and atraumatic. Nose: Nose normal. No congestion or rhinorrhea. Mouth/Throat:      Mouth: Mucous membranes are moist.      Pharynx: Oropharynx is clear. No oropharyngeal exudate or posterior oropharyngeal erythema. Eyes:      General:         Right eye: No discharge. Left eye: No discharge. Cardiovascular:      Rate and Rhythm: Normal rate and regular rhythm. Pulses: Normal pulses. Heart sounds: Normal heart sounds. No murmur heard. No gallop. Pulmonary:      Effort: Pulmonary effort is normal. No respiratory distress. Breath sounds: Normal breath sounds. No stridor. No wheezing, rhonchi or rales. Chest:      Chest wall: No tenderness. Abdominal:      General: Bowel sounds are normal. There is no distension. Palpations: Abdomen is soft. There is no mass. Tenderness: There is no abdominal tenderness.  There is no right CVA tenderness, left CVA tenderness, guarding or rebound. Hernia: No hernia is present. Musculoskeletal:         General: Normal range of motion. Cervical back: Normal range of motion and neck supple. Skin:     General: Skin is warm and dry. Capillary Refill: Capillary refill takes less than 2 seconds. Neurological:      General: No focal deficit present. Mental Status: He is alert and oriented to person, place, and time. Cranial Nerves: No cranial nerve deficit. Sensory: No sensory deficit. Motor: No weakness. Coordination: Coordination normal.      Gait: Gait normal.      Deep Tendon Reflexes: Reflexes normal.   Psychiatric:         Mood and Affect: Mood is anxious. Behavior: Behavior normal.         Thought Content:  Thought content normal.         Judgment: Judgment normal.         Vital Signs  ED Triage Vitals [12/09/23 2053]   Temperature Pulse Respirations Blood Pressure SpO2   97.6 °F (36.4 °C) 77 16 (!) 174/100 100 %      Temp Source Heart Rate Source Patient Position - Orthostatic VS BP Location FiO2 (%)   Temporal Monitor Sitting Left arm --      Pain Score       7           Vitals:    12/09/23 2230 12/09/23 2241 12/09/23 2245 12/09/23 2300   BP: 112/77 112/76 110/74 128/85   Pulse: 80 74 75 88   Patient Position - Orthostatic VS:    Sitting         Visual Acuity      ED Medications  Medications   sodium chloride (PF) 0.9 % injection 3 mL (has no administration in time range)   nitroglycerin (NITROSTAT) SL tablet 0.4 mg (0.4 mg Sublingual Given 12/9/23 2148)   aspirin chewable tablet 324 mg (324 mg Oral Given 12/9/23 2104)   ticagrelor (BRILINTA) tablet 180 mg (180 mg Oral Given 12/9/23 2104)   heparin (porcine) injection 4,000 Units (4,000 Units Intravenous Given 12/9/23 2103)   morphine injection 4 mg (4 mg Intravenous Given 12/9/23 2118)   ondansetron (ZOFRAN) injection 4 mg (4 mg Intravenous Given 12/9/23 2123)   sodium chloride 0.9 % infusion (1,000 mL Intravenous New Bag 12/9/23 2125)   fentanyl citrate (PF) 100 MCG/2ML 100 mcg (100 mcg Intravenous Given 12/9/23 2128)   tenecteplase (TNKase) injection 50 mg (50 mg Intravenous Given 12/9/23 2207)       Diagnostic Studies  Results Reviewed       Procedure Component Value Units Date/Time    HS Troponin I 4hr [[id number]]     Lab Status: No result Specimen: Blood     HS Troponin I 2hr [[id number]]  (Abnormal) Collected: 12/09/23 2230    Lab Status: Final result Specimen: Blood from Arm, Right Updated: 12/09/23 2256     hs TnI 2hr 1,348 ng/L      Delta 2hr hsTnI 1,199 ng/L     HS Troponin 0hr (reflex protocol) [[id number]]  (Abnormal) Collected: 12/09/23 2102    Lab Status: Final result Specimen: Blood from Arm, Right Updated: 12/09/23 2135     hs TnI 0hr 149 ng/L     Fingerstick Glucose (POCT) [[id number]]  (Abnormal) Collected: 12/09/23 2131    Lab Status: Final result Updated: 12/09/23 2133     POC Glucose 407 mg/dl     Comprehensive metabolic panel [[id number]]  (Abnormal) Collected: 12/09/23 2102    Lab Status: Final result Specimen: Blood from Arm, Right Updated: 12/09/23 2127     Sodium 133 mmol/L      Potassium 4.4 mmol/L      Chloride 96 mmol/L      CO2 24 mmol/L      ANION GAP 13 mmol/L      BUN 17 mg/dL      Creatinine 1.27 mg/dL      Glucose 445 mg/dL      Calcium 9.8 mg/dL      AST 33 U/L      ALT 46 U/L      Alkaline Phosphatase 82 U/L      Total Protein 7.5 g/dL      Albumin 4.7 g/dL      Total Bilirubin 0.81 mg/dL      eGFR 61 ml/min/1.73sq m     Narrative:      [place] guidelines for Chronic Kidney Disease (CKD):     Stage 1 with normal or high GFR (GFR > 90 mL/min/1.73 square meters)    Stage 2 Mild CKD (GFR = 60-89 mL/min/1.73 square meters)    Stage 3A Moderate CKD (GFR = 45-59 mL/min/1.73 square meters)    Stage 3B Moderate CKD (GFR = 30-44 mL/min/1.73 square meters)    Stage 4 Severe CKD (GFR = 15-29 mL/min/1.73 square meters)    Stage 5 End Stage CKD (GFR <15 mL/min/1.73 square meters)  Note: GFR calculation is accurate only with a steady state creatinine    APTT [[id number]]  (Normal) Collected: 12/09/23 2102    Lab Status: Final result Specimen: Blood from Arm, Right Updated: 12/09/23 2122     PTT 23 seconds     Protime-INR [[id number]]  (Normal) Collected: 12/09/23 2102    Lab Status: Final result Specimen: Blood from Arm, Right Updated: 12/09/23 2122     Protime 12.7 seconds      INR 0.92    CBC and differential [[id number]]  (Abnormal) Collected: 12/09/23 2102    Lab Status: Final result Specimen: Blood from Arm, Right Updated: 12/09/23 2107     WBC 7.51 Thousand/uL      RBC 5.41 Million/uL      Hemoglobin 15.6 g/dL      Hematocrit 45.5 %      MCV 84 fL      MCH 28.8 pg      MCHC 34.3 g/dL      RDW 12.4 %      MPV 9.9 fL      Platelets 738 Thousands/uL      nRBC 0 /100 WBCs      Neutrophils Relative 82 %      Immat GRANS % 1 %      Lymphocytes Relative 12 %      Monocytes Relative 5 %      Eosinophils Relative 0 %      Basophils Relative 0 %      Neutrophils Absolute 6.15 Thousands/µL      Immature Grans Absolute 0.04 Thousand/uL      Lymphocytes Absolute 0.88 Thousands/µL      Monocytes Absolute 0.40 Thousand/µL      Eosinophils Absolute 0.01 Thousand/µL      Basophils Absolute 0.03 Thousands/µL                    XR chest 1 view portable    (Results Pending)              Procedures  CriticalCare Time    Date/Time: 12/9/2023 9:12 PM    Performed by: Aditi Lucero MD  Authorized by: Aditi Lucero MD    Critical care provider statement:     Critical care time (minutes):  65    Critical care was necessary to treat or prevent imminent or life-threatening deterioration of the following conditions:  Circulatory failure and cardiac failure    Critical care was time spent personally by me on the following activities:  Interpretation of cardiac output measurements, ordering and performing treatments and interventions, ordering and review of radiographic studies, ordering and review of laboratory studies, re-evaluation of patient's condition, review of old charts, examination of patient, evaluation of patient's response to treatment, discussions with consultants, development of treatment plan with patient or surrogate and obtaining history from patient or surrogate    I assumed direction of critical care for this patient from another provider in my specialty: no             ED Course                               SBIRT 20yo+      Flowsheet Row Most Recent Value   Initial Alcohol Screen: US AUDIT-C     1. How often do you have a drink containing alcohol? 0 Filed at: 12/09/2023 2053   2. How many drinks containing alcohol do you have on a typical day you are drinking? 0 Filed at: 12/09/2023 2053   3a. Male UNDER 65: How often do you have five or more drinks on one occasion? 0 Filed at: 12/09/2023 2053   3b. FEMALE Any Age, or MALE 65+: How often do you have 4 or more drinks on one occassion? 0 Filed at: 12/09/2023 2053   Audit-C Score 0 Filed at: 12/09/2023 2053   SUMMER: How many times in the past year have you. .. Used an illegal drug or used a prescription medication for non-medical reasons? Never Filed at: 12/09/2023 2053                      Medical Decision Making  2059: STEMI alert. Placed on the monitor. Plan to complete basic labs including cardiac enzymes, stat chest x-ray. No findings to suggest PE or aortic dissection. I will consult [facility] interventionalists for transfer. Aspirin, Brilinta, heparin to be given. Nitroglycerin as needed. Observe for any hypotension. 2104: Discussed with Dr. Jonnathan Marin interventionalists, accepts for transfer to [facility]. 2200: [facility] was prepared to transport the patient via flight, however there was mechanical issues and had to cancel. Reaching out to other flight crew's without any avail as they are not flying due to weather. Discussed with interventionalists, he recommends TNK administration.   Patient accepted to [address] [place] [address] ICU Dr. Ravinder Wagoner per PACS. Amount and/or Complexity of Data Reviewed  Labs: ordered. Radiology: ordered. Details: Chest x-ray no acute pathology  ECG/medicine tests: ordered and independent interpretation performed. Details: Normal sinus rhythm with sinus arrhythmia 80 bpm, ST elevations in inferior leads, Q waves in inferior leads, ST depressions in aVL, V2    Risk  OTC drugs. Prescription drug management.              Disposition  Final diagnoses:   STEMI (ST elevation myocardial infarction) ([address])   Chest pain, unspecified   Hyperglycemia   Hypertension     Time reflects when diagnosis was documented in both MDM as applicable and the Disposition within this note       Time User Action Codes Description Comment    12/9/2023  8:59 PM Madolyn Speck Add [I21.3] STEMI (ST elevation myocardial infarction) ([address])     12/9/2023  8:59 PM Madolyn Speck Add [R07.9] Chest pain, unspecified     12/9/2023  9:32 PM Madolyn Speck Add [R73.9] Hyperglycemia     12/9/2023  9:32 PM Madolyn Speck Add [I10] Hypertension           ED Disposition       ED Disposition   Transfer to Another Facility-In Network    Condition   --    Date/Time   Sat Dec 9, 2023 2059    Comment                  MD Documentation      Flowsheet Row Most Recent Value   Patient Condition The patient has been stabilized such that within reasonable medical probability, no material deterioration of the patient condition or the condition of the unborn child(irais) is likely to result from the transfer   Reason for Transfer Level of Care needed not available at this facility   Benefits of Transfer Specialized equipment and/or services available at the receiving facility (Include comment)________________________   Risks of Transfer Potential for delay in receiving treatment, Potential deterioration of medical condition, Loss of IV, Possible worsening of condition or death during transfer, Increased discomfort during transfer   Accepting Physician Dr. Colletta Dome Name, Riky Murcia MD   Provider Certification General risk, such as traffic hazards, adverse weather conditions, rough terrain or turbulence, possible failure of equipment (including vehicle or aircraft), or consequences of actions of persons outside the control of the transport personnel, Unanticipated needs of medical equipment and personnel during transport, Risk of worsening condition, The possibility of a transport vehicle being unavailable          RN Documentation      [address] Name, [address] [facility]          Follow-up Information    None         Patient's Medications    No medications on file       No discharge procedures on file.     PDMP Review       None            ED Provider  Electronically Signed by             Rubén Warren MD  12/09/23 0645       Rubén Warren MD  12/09/23 6700

## 2023-12-10 NOTE — PLAN OF CARE
Problem: PAIN - ADULT  Goal: Verbalizes/displays adequate comfort level or baseline comfort level  Description: Interventions:  - Encourage patient to monitor pain and request assistance  - Assess pain using appropriate pain scale  - Administer analgesics based on type and severity of pain and evaluate response  - Implement non-pharmacological measures as appropriate and evaluate response  - Consider cultural and social influences on pain and pain management  - Notify physician/advanced practitioner if interventions unsuccessful or patient reports new pain  Outcome: Progressing     Problem: SAFETY ADULT  Goal: Patient will remain free of falls  Description: INTERVENTIONS:  - Educate patient/family on patient safety including physical limitations  - Instruct patient to call for assistance with activity   - Consult OT/PT to assist with strengthening/mobility   - Keep Call bell within reach  - Keep bed low and locked with side rails adjusted as appropriate  - Keep care items and personal belongings within reach  - Initiate and maintain comfort rounds  - Make Fall Risk Sign visible to staff  - Apply yellow socks and bracelet for high fall risk patients  - Consider moving patient to room near nurses station  Outcome: Progressing  Goal: Maintain or return to baseline ADL function  Description: INTERVENTIONS:  -  Assess patient's ability to carry out ADLs; assess patient's baseline for ADL function and identify physical deficits which impact ability to perform ADLs (bathing, care of mouth/teeth, toileting, grooming, dressing, etc.)  - Assess/evaluate cause of self-care deficits   - Assess range of motion  - Assess patient's mobility; develop plan if impaired  - Assess patient's need for assistive devices and provide as appropriate  - Encourage maximum independence but intervene and supervise when necessary  - Involve family in performance of ADLs  - Assess for home care needs following discharge   - Consider OT consult to assist with ADL evaluation and planning for discharge  - Provide patient education as appropriate  Outcome: Progressing  Goal: Maintains/Returns to pre admission functional level  Description: INTERVENTIONS:  - Perform AM-PAC 6 Click Basic Mobility/ Daily Activity assessment daily.  - Set and communicate daily mobility goal to care team and patient/family/caregiver. - Collaborate with rehabilitation services on mobility goals if consulted  - Out of bed for toileting  - Record patient progress and toleration of activity level   Outcome: Progressing     Problem: DISCHARGE PLANNING  Goal: Discharge to home or other facility with appropriate resources  Description: INTERVENTIONS:  - Identify barriers to discharge w/patient and caregiver  - Arrange for needed discharge resources and transportation as appropriate  - Identify discharge learning needs (meds, wound care, etc.)  - Arrange for interpretive services to assist at discharge as needed  - Refer to Case Management Department for coordinating discharge planning if the patient needs post-hospital services based on physician/advanced practitioner order or complex needs related to functional status, cognitive ability, or social support system  Outcome: Progressing     Problem: Knowledge Deficit  Goal: Patient/family/caregiver demonstrates understanding of disease process, treatment plan, medications, and discharge instructions  Description: Complete learning assessment and assess knowledge base.   Interventions:  - Provide teaching at level of understanding  - Provide teaching via preferred learning methods  Outcome: Progressing     Problem: CARDIOVASCULAR - ADULT  Goal: Maintains optimal cardiac output and hemodynamic stability  Description: INTERVENTIONS:  - Monitor I/O, vital signs and rhythm  - Monitor for S/S and trends of decreased cardiac output  - Administer and titrate ordered vasoactive medications to optimize hemodynamic stability  - Assess quality of pulses, skin color and temperature  - Assess for signs of decreased coronary artery perfusion  - Instruct patient to report change in severity of symptoms  Outcome: Progressing  Goal: Absence of cardiac dysrhythmias or at baseline rhythm  Description: INTERVENTIONS:  - Continuous cardiac monitoring, vital signs, obtain 12 lead EKG if ordered  - Administer antiarrhythmic and heart rate control medications as ordered  - Monitor electrolytes and administer replacement therapy as ordered  Outcome: Progressing     Problem: RESPIRATORY - ADULT  Goal: Achieves optimal ventilation and oxygenation  Description: INTERVENTIONS:  - Assess for changes in respiratory status  - Assess for changes in mentation and behavior  - Position to facilitate oxygenation and minimize respiratory effort  - Oxygen administered by appropriate delivery if ordered  - Initiate smoking cessation education as indicated  - Encourage broncho-pulmonary hygiene including cough, deep breathe, Incentive Spirometry  - Assess the need for suctioning and aspirate as needed  - Assess and instruct to report SOB or any respiratory difficulty  - Respiratory Therapy support as indicated  Outcome: Progressing     Problem: GENITOURINARY - ADULT  Goal: Maintains or returns to baseline urinary function  Description: INTERVENTIONS:  - Assess urinary function  - Encourage oral fluids to ensure adequate hydration if ordered  - Administer IV fluids as ordered to ensure adequate hydration  - Administer ordered medications as needed  - Offer frequent toileting  - Follow urinary retention protocol if ordered  Outcome: Progressing  Goal: Absence of urinary retention  Description: INTERVENTIONS:  - Assess patient’s ability to void and empty bladder  - Monitor I/O  - Bladder scan as needed  - Discuss with physician/AP medications to alleviate retention as needed  - Discuss catheterization for long term situations as appropriate  Outcome: Progressing  Goal: Urinary catheter remains patent  Description: INTERVENTIONS:  - Assess patency of urinary catheter  - If patient has a chronic cohen, consider changing catheter if non-functioning  - Follow guidelines for intermittent irrigation of non-functioning urinary catheter  Outcome: Progressing     Problem: METABOLIC, FLUID AND ELECTROLYTES - ADULT  Goal: Electrolytes maintained within normal limits  Description: INTERVENTIONS:  - Monitor labs and assess patient for signs and symptoms of electrolyte imbalances  - Administer electrolyte replacement as ordered  - Monitor response to electrolyte replacements, including repeat lab results as appropriate  - Instruct patient on fluid and nutrition as appropriate  Outcome: Progressing  Goal: Fluid balance maintained  Description: INTERVENTIONS:  - Monitor labs   - Monitor I/O and WT  - Instruct patient on fluid and nutrition as appropriate  - Assess for signs & symptoms of volume excess or deficit  Outcome: Progressing  Goal: Glucose maintained within target range  Description: INTERVENTIONS:  - Monitor Blood Glucose as ordered  - Assess for signs and symptoms of hyperglycemia and hypoglycemia  - Administer ordered medications to maintain glucose within target range  - Assess nutritional intake and initiate nutrition service referral as needed  Outcome: Progressing     Problem: SKIN/TISSUE INTEGRITY - ADULT  Goal: Skin Integrity remains intact(Skin Breakdown Prevention)  Description: Assess:  -Perform Tor assessment every   -Clean and moisturize skin every   -Inspect skin when repositioning, toileting, and assisting with ADLS  -Assess under medical devices such as  every   -Assess extremities for adequate circulation and sensation     Bed Management:  -Have minimal linens on bed & keep smooth, unwrinkled  -Change linens as needed when moist or perspiring  -Avoid sitting or lying in one position for more than  hours while in bed  -Keep HOB at degrees     Toileting:  -Offer bedside commode  -Assess for incontinence every  -Use incontinent care products after each incontinent episode such as    Activity:  -Mobilize patient times a day  -Encourage activity and walks on unit  -Encourage or provide ROM exercises   -Turn and reposition patient every  Hours  -Use appropriate equipment to lift or move patient in bed  -Instruct/ Assist with weight shifting every when out of bed in chair  -Consider limitation of chair time  hour intervals    Skin Care:  -Avoid use of baby powder, tape, friction and shearing, hot water or constrictive clothing  -Relieve pressure over bony prominences using   -Do not massage red bony areas    Next Steps:  -Teach patient strategies to minimize risks such as    -Consider consults to  interdisciplinary teams such as   Outcome: Progressing  Goal: Incision(s), wounds(s) or drain site(s) healing without S/S of infection  Description: INTERVENTIONS  - Assess and document dressing, incision, wound bed, drain sites and surrounding tissue  - Provide patient and family education  - Perform skin care/dressing changes every   Outcome: Progressing  Goal: Pressure injury heals and does not worsen  Description: Interventions:  - Implement low air loss mattress or specialty surface (Criteria met)  - Apply silicone foam dressing  - Instruct/assist with weight shifting every  minutes when in chair   - Limit chair time to  hour intervals  - Use special pressure reducing interventions such as  when in chair   - Apply fecal or urinary incontinence containment device   - Perform passive or active ROM every   - Turn and reposition patient & offload bony prominences every  hours   - Utilize friction reducing device or surface for transfers   - Consider consults to  interdisciplinary teams such as   - Use incontinent care products after each incontinent episode such as   - Consider nutrition services referral as needed  Outcome: Progressing     Problem: HEMATOLOGIC - ADULT  Goal: Maintains hematologic stability  Description: INTERVENTIONS  - Assess for signs and symptoms of bleeding or hemorrhage  - Monitor labs  - Administer supportive blood products/factors as ordered and appropriate  Outcome: Progressing     Problem: MUSCULOSKELETAL - ADULT  Goal: Maintain or return mobility to safest level of function  Description: INTERVENTIONS:  - Assess patient's ability to carry out ADLs; assess patient's baseline for ADL function and identify physical deficits which impact ability to perform ADLs (bathing, care of mouth/teeth, toileting, grooming, dressing, etc.)  - Assess/evaluate cause of self-care deficits   - Assess range of motion  - Assess patient's mobility  - Assess patient's need for assistive devices and provide as appropriate  - Encourage maximum independence but intervene and supervise when necessary  - Involve family in performance of ADLs  - Assess for home care needs following discharge   - Consider OT consult to assist with ADL evaluation and planning for discharge  - Provide patient education as appropriate  Outcome: Progressing  Goal: Maintain proper alignment of affected body part  Description: INTERVENTIONS:  - Support, maintain and protect limb and body alignment  - Provide patient/ family with appropriate education  Outcome: Progressing

## 2023-12-10 NOTE — STEMI DOCUMENTATION
Pt complaining of increased pain in chest and bilateral arm numbness and tingling.  Dr. Mary Vasquez made aware

## 2023-12-10 NOTE — CONSULTS
Consultation - Teresa Burris 60 y.o. male MRN: [id number]    Unit/Bed#: [facility] 514-01 Encounter: [id number]      Assessment/Plan     Assessment: This is a 60y.o.-year-old male with no significant past medical history who presented to [facility] with chest pain, was found to have STEMI and was given tPA, subsequently transferred to [facility] for further care. Endocrinology consulted for newly diagnosed type 2 diabetes mellitus    Plan:  Newly diagnosed type 2 diabetes mellitus with hyperglycemia  HbA1c 14  Home regimen: None  Inpatient regimen: Insulin drip    Recommendations:  - Given that the patient has significant hyperglycemia and blood glucose is not well-controlled, would recommend continuing with insulin drip for now  - Discussed with critical care team to change the diet to carbohydrate controlled diabetic clear liquid from clear liquid diet  - Will continue to monitor blood glucose and make adjustments as needed. Monitor for hypoglycemia and treat according to the protocol  - Discussed with the patient about lifestyle modifications such as carbohydrate controlled diet and exercise upon discharge  - Also discussed with the patient, wife, father at bedside that he will likely go home on insulin given his HbA1c of 14    CC: Diabetes Consult    History of Present Illness     HPI: Teresa Burris is a 60y.o. year old male with no significant past medical history who presented to [facility] with chest pain, was found to have STEMI, was given tPA and subsequently transferred to [facility] for further care. He reports that he has not been to his PCP in the last 2 years. Last blood work that he did was in 2021. He reports to have polyuria, polydipsia, unintentional weight loss of 7 to 10 pounds in the last 1 year. Denies any blurry vision, numbness or tingling in his legs. Besides CAD, he is unclear on any other known complications of diabetes.   He has family history of mother and father with type 2 diabetes mellitus which was diagnosed in their 62s, sister with type 2 diabetes mellitus which was diagnosed in her late 45s. Inpatient consult to Endocrinology  Consult performed by: Anila Maharaj MD  Consult ordered by: Shiv Lopez PA-C          Review of Systems   Constitutional:  Positive for appetite change. Negative for activity change and fatigue. HENT:  Negative for congestion and sore throat. Eyes:  Negative for redness and visual disturbance. Respiratory:  Negative for cough and shortness of breath. Cardiovascular:  Negative for palpitations and leg swelling. Gastrointestinal:  Negative for abdominal pain, constipation, diarrhea, nausea and vomiting. Endocrine: Negative for cold intolerance, heat intolerance, polydipsia, polyphagia and polyuria. Genitourinary:  Negative for difficulty urinating and dysuria. Musculoskeletal:  Negative for gait problem and neck pain. Skin:  Negative for color change. Neurological:  Negative for dizziness and syncope. Psychiatric/Behavioral:  Negative for agitation and behavioral problems. All other systems reviewed and are negative. Historical Information   No past medical history on file. No past surgical history on file. Social History   Social History     Substance and Sexual Activity   Alcohol Use Not Currently     Social History     Substance and Sexual Activity   Drug Use Never     Social History     Tobacco Use   Smoking Status Never   Smokeless Tobacco Never     Family History: No family history on file.     Meds/Allergies   Current Facility-Administered Medications   Medication Dose Route Frequency Provider Last Rate Last Admin    acetaminophen (TYLENOL) tablet 650 mg  650 mg Oral Q6H PRN Mi Francis PA-C   650 mg at 12/10/23 1017    aspirin (ECOTRIN LOW STRENGTH) EC tablet 81 mg  81 mg Oral Daily Mi Francis PA-C   81 mg at 12/10/23 0819    atorvastatin (LIPITOR) tablet 80 mg  80 mg Oral Daily With Dinner [facility] SHAYY Francis        chlorhexidine (PERIDEX) 0.12 % oral rinse 15 mL  15 mL Mouth/Throat Q12H [address] SHAYY Francis   15 mL at 12/10/23 2852    insulin regular (HumuLIN R,NovoLIN R) 1 Units/mL in sodium chloride 0.9 % 100 mL infusion  0.3-21 Units/hr Intravenous Titrated Mi Francis PA-C 2 mL/hr at 12/10/23 1012 2 Units/hr at 12/10/23 1012    metoprolol tartrate (LOPRESSOR) partial tablet 12.5 mg  12.5 mg Oral Q12H [address] [facility] SHAYY Lopez   12.5 mg at 12/10/23 0847    ondansetron (ZOFRAN) injection 4 mg  4 mg Intravenous Q6H PRN Mi Francis PA-C        senna-docusate sodium (SENOKOT S) 8.6-50 mg per tablet 2 tablet  2 tablet Oral BID Abi Kelley PA-C   2 tablet at 12/10/23 0819    ticagrelor (BRILINTA) tablet 90 mg  90 mg Oral Q12H [address] Mi Francis PA-C   90 mg at 12/10/23 0819     No Known Allergies    Objective   Vitals: Blood pressure 136/79, pulse 77, temperature 98.3 °F (36.8 °C), temperature source Oral, resp. rate 12, height 5' 7" (1.702 m), weight 92.3 kg (203 lb 7.8 oz), SpO2 95 %. Intake/Output Summary (Last 24 hours) at 12/10/2023 1048  Last data filed at 12/10/2023 1014  Gross per 24 hour   Intake 817.67 ml   Output 1280 ml   Net -462.33 ml     Invasive Devices       Peripheral Intravenous Line  Duration             Peripheral IV 12/09/23 Left Antecubital <1 day    Peripheral IV 12/09/23 Right Antecubital <1 day              Drain  Duration             Urethral Catheter 16 Fr. <1 day                    Physical Exam  Constitutional:       Appearance: Normal appearance. HENT:      Head: Normocephalic and atraumatic. Cardiovascular:      Rate and Rhythm: Normal rate and regular rhythm. Pulses: Normal pulses. Heart sounds: Normal heart sounds. No murmur heard. No gallop. Pulmonary:      Effort: Pulmonary effort is normal.      Breath sounds: Normal breath sounds. No wheezing or rales.    Abdominal:      General: Bowel sounds are normal.      Palpations: Abdomen is soft. Tenderness: There is no abdominal tenderness. Musculoskeletal:         General: No swelling. Cervical back: Normal range of motion and neck supple. Right lower leg: No edema. Left lower leg: No edema. Skin:     General: Skin is warm. Neurological:      Mental Status: He is alert and oriented to person, place, and time. Mental status is at baseline. Psychiatric:         Mood and Affect: Mood normal.         Behavior: Behavior normal.         The history was obtained from the review of the chart, patient. Lab Results:   Results from last 7 days   Lab Units 12/10/23  0632   HEMOGLOBIN A1C % 14.0*     Lab Results   Component Value Date    WBC 8.32 12/10/2023    HGB 13.5 12/10/2023    HCT 39.1 12/10/2023    MCV 86 12/10/2023     12/10/2023     Lab Results   Component Value Date/Time    BUN 17 12/10/2023 12:00 AM    K 4.5 12/10/2023 12:00 AM     12/10/2023 12:00 AM    CO2 28 12/10/2023 12:00 AM    CREATININE 1.10 12/10/2023 12:00 AM     (H) 12/10/2023 12:00 AM    ALT 62 (H) 12/10/2023 12:00 AM    TP 6.0 (L) 12/10/2023 12:00 AM    ALB 3.8 12/10/2023 12:00 AM     Recent Labs     12/10/23  0000   HDL 35*   TRIG 306*     No results found for: "[facility]", "[address]"  POC Glucose (mg/dl)   Date Value   12/10/2023 137   12/10/2023 294 (H)   12/10/2023 283 (H)   12/10/2023 423 (H)   12/10/2023 336 (H)   12/09/2023 407 (H)       Imaging Studies: I have personally reviewed pertinent reports. Portions of the record may have been created with voice recognition software. Please Tigertext questions to the clinician covering the "[id number]" Role. Thank you.

## 2023-12-10 NOTE — ASSESSMENT & PLAN NOTE
Patient with a history of HTN  Not taking any home medications  Monitor BP and start BB when able  Goal SBP <130

## 2023-12-10 NOTE — EMTALA/ACUTE CARE TRANSFER
[address] [address]  Dept: [phone]      EMTALA TRANSFER CONSENT    NAME Curt Scott                                         1965                              MRN [id number]    I have been informed of my rights regarding examination, treatment, and transfer   by Dr. Sonja Martinez MD    Benefits: Specialized equipment and/or services available at the receiving facility (Include comment)________________________    Risks: Potential for delay in receiving treatment, Potential deterioration of medical condition, Loss of IV, Possible worsening of condition or death during transfer, Increased discomfort during transfer      Consent for Transfer:  I acknowledge that my medical condition has been evaluated and explained to me by the emergency department physician or other qualified medical person and/or my attending physician, who has recommended that I be transferred to the service of  Accepting Physician: Dr. Pierre Lara at [address] [place] [address] [address] Name, [address] : [facility]. The above potential benefits of such transfer, the potential risks associated with such transfer, and the probable risks of not being transferred have been explained to me, and I fully understand them. The doctor has explained that, in my case, the benefits of transfer outweigh the risks. I agree to be transferred. I authorize the performance of emergency medical procedures and treatments upon me in both transit and upon arrival at the receiving facility. Additionally, I authorize the release of any and all medical records to the receiving facility and request they be transported with me, if possible. I understand that the safest mode of transportation during a medical emergency is an ambulance and that the Hospital advocates the use of this mode of transport.  Risks of traveling to the receiving facility by car, including absence of medical control, life sustaining equipment, such as oxygen, and medical personnel has been explained to me and I fully understand them. (LAURY CORRECT BOX BELOW)  [  ]  I consent to the stated transfer and to be transported by ambulance/helicopter. [  ]  I consent to the stated transfer, but refuse transportation by ambulance and accept full responsibility for my transportation by car. I understand the risks of non-ambulance transfers and I exonerate the Hospital and its staff from any deterioration in my condition that results from this refusal.    X___________________________________________    DATE  23  TIME________  Signature of patient or legally responsible individual signing on patient behalf           RELATIONSHIP TO PATIENT_________________________          Provider Certification    NAME Royal Jaiden SHINB 1965                              MRN [id number]    A medical screening exam was performed on the above named patient. Based on the examination:    Condition Necessitating Transfer The primary encounter diagnosis was STEMI (ST elevation myocardial infarction) ([address]). A diagnosis of Chest pain, unspecified was also pertinent to this visit.     Patient Condition: The patient has been stabilized such that within reasonable medical probability, no material deterioration of the patient condition or the condition of the unborn child(irais) is likely to result from the transfer    Reason for Transfer: Level of Care needed not available at this facility    Transfer Requirements: [address] available and qualified personnel available for treatment as acknowledged by    Agreed to accept transfer and to provide appropriate medical treatment as acknowledged by       Dr. Drake Tejada  Appropriate medical records of the examination and treatment of the patient are provided at the time of transfer   [address] _______  Transfer will be performed by qualified personnel from    and appropriate transfer equipment as required, including the use of necessary and appropriate life support measures. Provider Certification: I have examined the patient and explained the following risks and benefits of being transferred/refusing transfer to the patient/family:  General risk, such as traffic hazards, adverse weather conditions, rough terrain or turbulence, possible failure of equipment (including vehicle or aircraft), or consequences of actions of persons outside the control of the transport personnel, Unanticipated needs of medical equipment and personnel during transport, Risk of worsening condition, The possibility of a transport vehicle being unavailable      Based on these reasonable risks and benefits to the patient and/or the unborn child(irais), and based upon the information available at the time of the patient’s examination, I certify that the medical benefits reasonably to be expected from the provision of appropriate medical treatments at another medical facility outweigh the increasing risks, if any, to the individual’s medical condition, and in the case of labor to the unborn child, from effecting the transfer.     X____________________________________________ DATE 12/09/23        TIME_______      ORIGINAL - SEND TO MEDICAL RECORDS   COPY - SEND WITH PATIENT DURING TRANSFER

## 2023-12-11 LAB
ALBUMIN SERPL BCP-MCNC: 3.4 G/DL (ref 3.5–5)
ALP SERPL-CCNC: 57 U/L (ref 34–104)
ALT SERPL W P-5'-P-CCNC: 49 U/L (ref 7–52)
ANION GAP SERPL CALCULATED.3IONS-SCNC: 9 MMOL/L
APTT PPP: 52 SECONDS (ref 23–37)
AST SERPL W P-5'-P-CCNC: 90 U/L (ref 13–39)
ATRIAL RATE: 70 BPM
ATRIAL RATE: 71 BPM
ATRIAL RATE: 80 BPM
BILIRUB DIRECT SERPL-MCNC: 0.16 MG/DL (ref 0–0.2)
BILIRUB SERPL-MCNC: 0.6 MG/DL (ref 0.2–1)
BUN SERPL-MCNC: 20 MG/DL (ref 5–25)
CALCIUM SERPL-MCNC: 8.1 MG/DL (ref 8.4–10.2)
CATH EF QUANTITATIVE: 55 %
CHLORIDE SERPL-SCNC: 110 MMOL/L (ref 96–108)
CO2 SERPL-SCNC: 27 MMOL/L (ref 21–32)
CREAT SERPL-MCNC: 1.07 MG/DL (ref 0.6–1.3)
ERYTHROCYTE [DISTWIDTH] IN BLOOD BY AUTOMATED COUNT: 12.8 % (ref 11.6–15.1)
GFR SERPL CREATININE-BSD FRML MDRD: 75 ML/MIN/1.73SQ M
GLUCOSE SERPL-MCNC: 122 MG/DL (ref 65–140)
GLUCOSE SERPL-MCNC: 126 MG/DL (ref 65–140)
GLUCOSE SERPL-MCNC: 127 MG/DL (ref 65–140)
GLUCOSE SERPL-MCNC: 128 MG/DL (ref 65–140)
GLUCOSE SERPL-MCNC: 135 MG/DL (ref 65–140)
GLUCOSE SERPL-MCNC: 136 MG/DL (ref 65–140)
GLUCOSE SERPL-MCNC: 146 MG/DL (ref 65–140)
GLUCOSE SERPL-MCNC: 157 MG/DL (ref 65–140)
GLUCOSE SERPL-MCNC: 185 MG/DL (ref 65–140)
GLUCOSE SERPL-MCNC: 194 MG/DL (ref 65–140)
GLUCOSE SERPL-MCNC: 210 MG/DL (ref 65–140)
GLUCOSE SERPL-MCNC: 212 MG/DL (ref 65–140)
GLUCOSE SERPL-MCNC: 263 MG/DL (ref 65–140)
HCT VFR BLD AUTO: 38.9 % (ref 36.5–49.3)
HGB BLD-MCNC: 13 G/DL (ref 12–17)
INR PPP: 1.08 (ref 0.84–1.19)
MCH RBC QN AUTO: 29.5 PG (ref 26.8–34.3)
MCHC RBC AUTO-ENTMCNC: 33.4 G/DL (ref 31.4–37.4)
MCV RBC AUTO: 88 FL (ref 82–98)
P AXIS: 59 DEGREES
P AXIS: 63 DEGREES
P AXIS: 78 DEGREES
PLATELET # BLD AUTO: 154 THOUSANDS/UL (ref 149–390)
PMV BLD AUTO: 9.7 FL (ref 8.9–12.7)
POTASSIUM SERPL-SCNC: 3.5 MMOL/L (ref 3.5–5.3)
PR INTERVAL: 150 MS
PR INTERVAL: 152 MS
PR INTERVAL: 158 MS
PROT SERPL-MCNC: 5.4 G/DL (ref 6.4–8.4)
PROTHROMBIN TIME: 13.9 SECONDS (ref 11.6–14.5)
QRS AXIS: -45 DEGREES
QRS AXIS: -75 DEGREES
QRS AXIS: 15 DEGREES
QRSD INTERVAL: 80 MS
QRSD INTERVAL: 82 MS
QRSD INTERVAL: 94 MS
QT INTERVAL: 366 MS
QT INTERVAL: 374 MS
QT INTERVAL: 396 MS
QTC INTERVAL: 397 MS
QTC INTERVAL: 427 MS
QTC INTERVAL: 431 MS
RBC # BLD AUTO: 4.41 MILLION/UL (ref 3.88–5.62)
SODIUM SERPL-SCNC: 146 MMOL/L (ref 135–147)
T WAVE AXIS: 67 DEGREES
T WAVE AXIS: 79 DEGREES
T WAVE AXIS: 96 DEGREES
VENTRICULAR RATE: 70 BPM
VENTRICULAR RATE: 71 BPM
VENTRICULAR RATE: 80 BPM
WBC # BLD AUTO: 7.38 THOUSAND/UL (ref 4.31–10.16)

## 2023-12-11 PROCEDURE — 80076 HEPATIC FUNCTION PANEL: CPT | Performed by: PHYSICIAN ASSISTANT

## 2023-12-11 PROCEDURE — 82948 REAGENT STRIP/BLOOD GLUCOSE: CPT

## 2023-12-11 PROCEDURE — 93458 L HRT ARTERY/VENTRICLE ANGIO: CPT | Performed by: INTERNAL MEDICINE

## 2023-12-11 PROCEDURE — NC001 PR NO CHARGE: Performed by: PHYSICIAN ASSISTANT

## 2023-12-11 PROCEDURE — C1769 GUIDE WIRE: HCPCS | Performed by: INTERNAL MEDICINE

## 2023-12-11 PROCEDURE — B2151ZZ FLUOROSCOPY OF LEFT HEART USING LOW OSMOLAR CONTRAST: ICD-10-PCS | Performed by: INTERNAL MEDICINE

## 2023-12-11 PROCEDURE — 80048 BASIC METABOLIC PNL TOTAL CA: CPT | Performed by: PHYSICIAN ASSISTANT

## 2023-12-11 PROCEDURE — 99233 SBSQ HOSP IP/OBS HIGH 50: CPT | Performed by: STUDENT IN AN ORGANIZED HEALTH CARE EDUCATION/TRAINING PROGRAM

## 2023-12-11 PROCEDURE — 93005 ELECTROCARDIOGRAM TRACING: CPT

## 2023-12-11 PROCEDURE — 4A023N7 MEASUREMENT OF CARDIAC SAMPLING AND PRESSURE, LEFT HEART, PERCUTANEOUS APPROACH: ICD-10-PCS | Performed by: INTERNAL MEDICINE

## 2023-12-11 PROCEDURE — 85610 PROTHROMBIN TIME: CPT | Performed by: PHYSICIAN ASSISTANT

## 2023-12-11 PROCEDURE — 99152 MOD SED SAME PHYS/QHP 5/>YRS: CPT | Performed by: INTERNAL MEDICINE

## 2023-12-11 PROCEDURE — 99232 SBSQ HOSP IP/OBS MODERATE 35: CPT | Performed by: INTERNAL MEDICINE

## 2023-12-11 PROCEDURE — C1894 INTRO/SHEATH, NON-LASER: HCPCS | Performed by: INTERNAL MEDICINE

## 2023-12-11 PROCEDURE — 85730 THROMBOPLASTIN TIME PARTIAL: CPT | Performed by: EMERGENCY MEDICINE

## 2023-12-11 PROCEDURE — 85027 COMPLETE CBC AUTOMATED: CPT | Performed by: PHYSICIAN ASSISTANT

## 2023-12-11 PROCEDURE — 99153 MOD SED SAME PHYS/QHP EA: CPT | Performed by: INTERNAL MEDICINE

## 2023-12-11 PROCEDURE — B2111ZZ FLUOROSCOPY OF MULTIPLE CORONARY ARTERIES USING LOW OSMOLAR CONTRAST: ICD-10-PCS | Performed by: INTERNAL MEDICINE

## 2023-12-11 RX ORDER — POTASSIUM CHLORIDE 14.9 MG/ML
20 INJECTION INTRAVENOUS
Status: DISCONTINUED | OUTPATIENT
Start: 2023-12-11 | End: 2023-12-11

## 2023-12-11 RX ORDER — HEPARIN SODIUM 1000 [USP'U]/ML
INJECTION, SOLUTION INTRAVENOUS; SUBCUTANEOUS CODE/TRAUMA/SEDATION MEDICATION
Status: DISCONTINUED | OUTPATIENT
Start: 2023-12-11 | End: 2023-12-11 | Stop reason: HOSPADM

## 2023-12-11 RX ORDER — POTASSIUM CHLORIDE 20 MEQ/1
20 TABLET, EXTENDED RELEASE ORAL ONCE
Status: COMPLETED | OUTPATIENT
Start: 2023-12-11 | End: 2023-12-11

## 2023-12-11 RX ORDER — MIDAZOLAM HYDROCHLORIDE 2 MG/2ML
INJECTION, SOLUTION INTRAMUSCULAR; INTRAVENOUS CODE/TRAUMA/SEDATION MEDICATION
Status: DISCONTINUED | OUTPATIENT
Start: 2023-12-11 | End: 2023-12-11 | Stop reason: HOSPADM

## 2023-12-11 RX ORDER — NITROGLYCERIN 0.4 MG/1
0.4 TABLET SUBLINGUAL
Status: DISCONTINUED | OUTPATIENT
Start: 2023-12-11 | End: 2023-12-12 | Stop reason: HOSPADM

## 2023-12-11 RX ORDER — LIDOCAINE HYDROCHLORIDE 10 MG/ML
INJECTION, SOLUTION EPIDURAL; INFILTRATION; INTRACAUDAL; PERINEURAL CODE/TRAUMA/SEDATION MEDICATION
Status: DISCONTINUED | OUTPATIENT
Start: 2023-12-11 | End: 2023-12-11 | Stop reason: HOSPADM

## 2023-12-11 RX ORDER — FENTANYL CITRATE 50 UG/ML
INJECTION, SOLUTION INTRAMUSCULAR; INTRAVENOUS CODE/TRAUMA/SEDATION MEDICATION
Status: DISCONTINUED | OUTPATIENT
Start: 2023-12-11 | End: 2023-12-11 | Stop reason: HOSPADM

## 2023-12-11 RX ORDER — NITROGLYCERIN 20 MG/100ML
INJECTION INTRAVENOUS CODE/TRAUMA/SEDATION MEDICATION
Status: DISCONTINUED | OUTPATIENT
Start: 2023-12-11 | End: 2023-12-11 | Stop reason: HOSPADM

## 2023-12-11 RX ORDER — INSULIN GLARGINE 100 [IU]/ML
30 INJECTION, SOLUTION SUBCUTANEOUS
Status: DISCONTINUED | OUTPATIENT
Start: 2023-12-11 | End: 2023-12-12 | Stop reason: HOSPADM

## 2023-12-11 RX ORDER — INSULIN LISPRO 100 [IU]/ML
1-6 INJECTION, SOLUTION INTRAVENOUS; SUBCUTANEOUS
Status: DISCONTINUED | OUTPATIENT
Start: 2023-12-12 | End: 2023-12-12 | Stop reason: HOSPADM

## 2023-12-11 RX ORDER — SODIUM CHLORIDE 9 MG/ML
75 INJECTION, SOLUTION INTRAVENOUS CONTINUOUS
Status: DISPENSED | OUTPATIENT
Start: 2023-12-11 | End: 2023-12-11

## 2023-12-11 RX ORDER — VERAPAMIL HYDROCHLORIDE 2.5 MG/ML
INJECTION, SOLUTION INTRAVENOUS CODE/TRAUMA/SEDATION MEDICATION
Status: DISCONTINUED | OUTPATIENT
Start: 2023-12-11 | End: 2023-12-11 | Stop reason: HOSPADM

## 2023-12-11 RX ORDER — HEPARIN SODIUM 5000 [USP'U]/ML
5000 INJECTION, SOLUTION INTRAVENOUS; SUBCUTANEOUS EVERY 8 HOURS SCHEDULED
Status: DISCONTINUED | OUTPATIENT
Start: 2023-12-11 | End: 2023-12-12 | Stop reason: HOSPADM

## 2023-12-11 RX ORDER — INSULIN LISPRO 100 [IU]/ML
10 INJECTION, SOLUTION INTRAVENOUS; SUBCUTANEOUS
Status: DISCONTINUED | OUTPATIENT
Start: 2023-12-12 | End: 2023-12-12 | Stop reason: HOSPADM

## 2023-12-11 RX ORDER — INSULIN LISPRO 100 [IU]/ML
1-5 INJECTION, SOLUTION INTRAVENOUS; SUBCUTANEOUS
Status: DISCONTINUED | OUTPATIENT
Start: 2023-12-12 | End: 2023-12-12 | Stop reason: HOSPADM

## 2023-12-11 RX ADMIN — Medication 12.5 MG: at 08:02

## 2023-12-11 RX ADMIN — POTASSIUM CHLORIDE 20 MEQ: 14.9 INJECTION, SOLUTION INTRAVENOUS at 07:12

## 2023-12-11 RX ADMIN — HEPARIN SODIUM 2000 UNITS: 1000 INJECTION INTRAVENOUS; SUBCUTANEOUS at 04:12

## 2023-12-11 RX ADMIN — PANTOPRAZOLE SODIUM 20 MG: 20 TABLET, DELAYED RELEASE ORAL at 06:19

## 2023-12-11 RX ADMIN — HEPARIN SODIUM 17.1 UNITS/KG/HR: 10000 INJECTION, SOLUTION INTRAVENOUS at 06:26

## 2023-12-11 RX ADMIN — Medication 12.5 MG: at 22:26

## 2023-12-11 RX ADMIN — SODIUM CHLORIDE 8 UNITS/HR: 9 INJECTION, SOLUTION INTRAVENOUS at 16:04

## 2023-12-11 RX ADMIN — TICAGRELOR 90 MG: 90 TABLET ORAL at 22:26

## 2023-12-11 RX ADMIN — HEPARIN SODIUM 5000 UNITS: 5000 INJECTION INTRAVENOUS; SUBCUTANEOUS at 22:26

## 2023-12-11 RX ADMIN — ASPIRIN 81 MG: 81 TABLET, COATED ORAL at 08:02

## 2023-12-11 RX ADMIN — SODIUM CHLORIDE 75 ML/HR: 0.9 INJECTION, SOLUTION INTRAVENOUS at 10:56

## 2023-12-11 RX ADMIN — SENNOSIDES, DOCUSATE SODIUM 2 TABLET: 8.6; 5 TABLET ORAL at 17:56

## 2023-12-11 RX ADMIN — POTASSIUM CHLORIDE 20 MEQ: 1500 TABLET, EXTENDED RELEASE ORAL at 10:56

## 2023-12-11 RX ADMIN — CHLORHEXIDINE GLUCONATE 15 ML: 1.2 SOLUTION ORAL at 08:02

## 2023-12-11 RX ADMIN — INSULIN GLARGINE 30 UNITS: 100 INJECTION, SOLUTION SUBCUTANEOUS at 22:26

## 2023-12-11 RX ADMIN — ATORVASTATIN CALCIUM 80 MG: 80 TABLET, FILM COATED ORAL at 17:55

## 2023-12-11 RX ADMIN — TICAGRELOR 90 MG: 90 TABLET ORAL at 08:02

## 2023-12-11 NOTE — PROGRESS NOTES
Progress Note - Tanner Copeland 60 y.o. male MRN: [id number]    Unit/Bed#: [facility] 298-51 Encounter: [id number]      CC: diabetes f/u    Subjective:   Tanner Copeland is a 60y.o. year old male with newly diagnosed diabetes admitted with STEMI status post tPA. Started on IV insulin on admission on for glycemic control which he currently remains on. Tolerating diet. Objective:     Vitals: Blood pressure 107/75, pulse 78, temperature 97.7 °F (36.5 °C), temperature source Oral, resp. rate 18, height 5' 7" (1.702 m), weight 94.2 kg (207 lb 10.8 oz), SpO2 98 %. ,Body mass index is 32.53 kg/m². Intake/Output Summary (Last 24 hours) at 12/11/2023 1634  Last data filed at 12/11/2023 1604  Gross per 24 hour   Intake 1483.65 ml   Output 1105 ml   Net 378.65 ml       Physical Exam:  General Appearance: awake, appears stated age and cooperative  Head: Normocephalic, without obvious abnormality, atraumatic  Extremities: moves all extremities  Skin: Skin color and temperature normal.   Pulm: no labored breathing    Lab, Imaging and other studies: I have personally reviewed pertinent reports. POC Glucose (mg/dl)   Date Value   12/11/2023 263 (H)   12/11/2023 194 (H)   12/11/2023 122   12/11/2023 210 (H)   12/11/2023 135   12/11/2023 126   12/11/2023 146 (H)   12/11/2023 128   12/10/2023 108   12/10/2023 189 (H)       Assessment and plan:    #Newly diagnosed diabetes  HbA1c 14  Home regimen: None  Inpatient regimen: Insulin drip    Recommendations:  transition off IV insulin to basal bolus insulin regimen starting tonight with Lantus 30 units daily at bedtime, DC IV insulin 2 hours after dose of Lantus is given, Humalog 10 units 3 times daily with meals from tomorrow morning with correctional Premeal insulin coverage. Monitor Accu-Cheks and adjust insulin regimen as needed. Portions of the record may have been created with voice recognition software.

## 2023-12-11 NOTE — ASSESSMENT & PLAN NOTE
Patient presents to [address] [place] [address] ED with 3 hours of persistent substernal chest pain. Associated with left arm pain.  No SOB, dizziness or palpitations  Found to have inferior ST elevations  MI alert called but due to lack of transpiration, patient was unable to be transferred to cath lab in timely manner  TNK administered with plans for transfer to [facility]  After TNK, patient had relief of his pain    Plan:  ASA and Brilinta  Statin  Heparin infusion  ECHO: EF 55%, hypokinesis of posterobasal segment  Cardiology consult  12/11 cardiac cath

## 2023-12-11 NOTE — UTILIZATION REVIEW
Initial Clinical Review    Admission: Date/Time/Statement:   Admission Orders (From admission, onward)       Ordered        12/10/23 0010  Inpatient Admission  Once                          Orders Placed This Encounter   Procedures    Inpatient Admission     Standing Status:   Standing     Number of Occurrences:   1     Order Specific Question:   Level of Care     Answer:   Critical Care [15]     Order Specific Question:   Estimated length of stay     Answer:   More than 2 Midnights     Order Specific Question:   Certification     Answer:   I certify that inpatient services are medically necessary for this patient for a duration of greater than two midnights. See H&P and MD Progress Notes for additional information about the patient's course of treatment. ED Arrival Information       Patient not seen in ED                       No chief complaint on file. Initial Presentation: 60 y.o. male w/ PMH of obesity, HTN was transferred from [facility] to [facility] for higher level of care. Pt initially presented to [address] [place] [address] w/ 3 hours of substernal chest pressure with associated left arm pain. Reports 4 episodes over the last week lasting 15 mins. He was found to have inferior ST elevations. received fentanyl, morphine and nitroglycerin in the ED without relief of his pain. MI alert was called but unable to transfer to [facility] in a timely manner d/t transportation issue, TNK was given w/ resolution of symptoms. Transferred to [address] [place] [address] for cardiac evaluation. On arrival to [facility], pt w/o complaints, aaox3, normal HR and regular rhythm. BG noted> 400. Admit as Inpatient for evaluation and treatment of STEMI, hyperglycemia. Plan: ASA and Brilinta. Statin. ECHO. Cardiology consulted- plan for cardiac cath. Check Hgb A1C. Insulin infusion for now. 12/10  Cardiology Consult: Inferior STEMI s/p lytics. Now stable, CP free. Echo shows mild RWMA of basal inferior but otherwise good LV and RV function. LVEF normal 60-65%. Telemetry with NSVT up to 11 beats. Continue DAPT aspirin 81 mg daily and ticagrelor 90 mg BID  Start heparin gtt ACS protocol. Plan for cardiac cath tomorrow. Endo Consult; T2DM w/ hyperglycemia: Continue insulin drip for now. Eventually transition to subcutaneous insulin regimen. Monitor for hypoglycemia and treat per protocol. Date: 12/11   Day 2:   Critical Care Notes: Patient with no recurrence of CP. Plan for cardiac cath today. Some episodes of NSVT. cont hep gtt. ASA, brilinta, statin. Cont insulin gtt. Lopressor 12.5 mg bid, SBP goal<130. Trend LFTs.      ED Triage Vitals   Temperature Pulse Respirations Blood Pressure SpO2   12/10/23 0011 12/10/23 0011 12/10/23 0100 12/10/23 0011 12/10/23 0011   97.8 °F (36.6 °C) 99 19 126/80 97 %      Temp Source Heart Rate Source Patient Position - Orthostatic VS BP Location FiO2 (%)   12/10/23 0100 12/10/23 0700 -- -- --   Oral Monitor         Pain Score       12/10/23 0015       No Pain          Wt Readings from Last 1 Encounters:   12/11/23 94.2 kg (207 lb 10.8 oz)     Additional Vital Signs:   Date/Time Temp Pulse Resp BP MAP (mmHg) SpO2 Calculated FIO2 (%) - Nasal Cannula Nasal Cannula O2 Flow Rate (L/min) O2 Device   12/11/23 1115 -- 79 15 -- -- 96 % -- -- --   12/11/23 1100 -- 76 16 101/59 74 96 % -- -- --   12/11/23 1045 -- 71 21 98/57 72 96 % -- -- --   12/11/23 09:43:05 -- -- -- -- -- -- 32 3 L/min Nasal cannula   12/11/23 0800 97.5 °F (36.4 °C) 71 16 114/72 89 96 % -- -- None (Room air)   12/11/23 0700 -- 78 22 92/50 65 95 % -- -- --   12/11/23 0600 -- 73 24 Abnormal  104/57 75 93 % -- -- --   12/11/23 0500 -- 87 34 Abnormal  98/49 Abnormal  71 95 % -- -- --   12/11/23 0400 98.4 °F (36.9 °C) 69 16 109/50 72 94 % -- -- --   12/11/23 0300 -- 71 15 99/53 71 94 % -- -- --   12/11/23 0200 -- 78 16 101/60 74 94 % -- -- --   12/11/23 0100 -- 70 14 101/58 76 94 % -- -- --   12/11/23 0000 98.2 °F (36.8 °C) 77 24 Abnormal  94/55 69 93 % -- -- --   12/10/23 2300 -- 79 27 Abnormal  85/49 Abnormal  62 Abnormal  93 % -- -- --   12/10/23 2200 -- 79 25 Abnormal  92/54 67 93 % -- -- --   12/10/23 2122 -- 79 -- 99/54 -- -- -- -- --   12/10/23 2100 -- 83 32 Abnormal  104/56 77 94 % -- -- --   12/10/23 2000 98.1 °F (36.7 °C) 74 23 Abnormal  91/55 67 92 % -- -- --   12/10/23 1900 -- 79 18 96/56 69 95 % -- -- --   12/10/23 1800 -- 78 19 102/67 80 95 % -- -- --   12/10/23 1700 -- 85 26 Abnormal  103/65 79 95 % -- -- None (Room air)   12/10/23 1600 98.2 °F (36.8 °C) 79 24 Abnormal  101/58 75 93 % -- -- None (Room air)   12/10/23 1500 -- 81 21 107/57 78 94 % -- -- --   12/10/23 1300 -- 69 10 Abnormal  118/63 85 95 % -- -- --   12/10/23 1200 98.6 °F (37 °C) 68 22 104/66 80 94 % -- -- None (Room air)   12/10/23 1100 -- 76 26 Abnormal  103/75 85 95 % -- -- --   12/10/23 1000 -- 77 12 136/79 91 95 % -- -- --   12/10/23 0900 -- 77 20 108/63 81 94 % -- -- --   12/10/23 0830 -- 80 -- 107/71 85 97 % -- -- --   12/10/23 0800 -- 79 -- 105/66 78 96 % -- -- --   12/10/23 0700 98.3 °F (36.8 °C) 74 13 97/55 71 95 % -- -- None (Room air)   12/10/23 0600 -- 74 23 Abnormal  105/56 75 94 % -- -- --   12/10/23 0500 -- 97 21 110/59 81 95 % -- -- --   12/10/23 0400 98.6 °F (37 °C) 84 23 Abnormal  111/56 80 94 % -- -- --   12/10/23 0300 -- 89 23 Abnormal  109/62 80 94 % -- -- --   12/10/23 0200 -- 90 15 115/58 80 95 % -- -- --   12/10/23 0100 97.9 °F (36.6 °C) 94 19 130/69 94 95 % -- -- --       Pertinent Labs/Diagnostic Test Results:   12/09 EKG result:Normal sinus rhythm with sinus arrhythmia  Inferior-posterior infarct , possibly acute  ACUTE MI / STEMI   Consider right ventricular involvement in acute inferior infarct    12/10  EKG result:   Sinus rhythm with occasional Premature ventricular complexes  Left axis deviation  Inferior infarct (cited on or before 09-DEC-2023)  Anterolateral infarct (cited on or before 09-DEC-2023)    ECHO:    Left Ventricle: Left ventricular cavity size is normal. Wall thickness is normal. The left ventricular ejection fraction is 55%. Systolic function is normal. Hypokinesis of the posterobasal segment of the left ventricle Diastolic function is normal.    Mitral Valve: There is mild regurgitation. Tricuspid Valve: There is trace regurgitation. The right ventricular systolic pressure is normal.    12/11   Cardiac Cath:    s/p thrombolytic therapy for inferior stemi with successful clinical reperfusion.  Cath today with no significant obstructive disease requiring PCI   Recommendation:  1) statin, dapt,     EKG result: Normal sinus rhythm  Left axis deviation  Low voltage QRS  Inferior infarct (cited on or before 09-DEC-2023)  Anterolateral infarct (cited on or before 09-DEC-2023)      Results from last 7 days   Lab Units 12/11/23  0439 12/10/23  0336 12/09/23  2102   WBC Thousand/uL 7.38 8.32 7.51   HEMOGLOBIN g/dL 13.0 13.5 15.6   HEMATOCRIT % 38.9 39.1 45.5   PLATELETS Thousands/uL 154 189 198   NEUTROS ABS Thousands/µL  --  6.76 6.15         Results from last 7 days   Lab Units 12/11/23  0439 12/10/23  1423 12/10/23  0411 12/10/23  0000 12/09/23  2102   SODIUM mmol/L 146 139  --  135 133*   POTASSIUM mmol/L 3.5 3.2*  --  4.5 4.4   CHLORIDE mmol/L 110* 104  --  101 96   CO2 mmol/L 27 29  --  28 24   ANION GAP mmol/L 9 6  --  6 13   BUN mg/dL 20 16  --  17 17   CREATININE mg/dL 1.07 0.97  --  1.10 1.27   EGFR ml/min/1.73sq m 75 85  --  73 61   CALCIUM mg/dL 8.1* 8.8  --  8.7 9.8   CALCIUM, IONIZED mmol/L  --   --  1.15  --   --    MAGNESIUM mg/dL  --  2.1  --  1.9  --    PHOSPHORUS mg/dL  --   --   --  4.6*  --      Results from last 7 days   Lab Units 12/11/23  0439 12/10/23  0000 12/09/23  2102   AST U/L 90* 165* 33   ALT U/L 49 62* 46   ALK PHOS U/L 57 67 82   TOTAL PROTEIN g/dL 5.4* 6.0* 7.5   ALBUMIN g/dL 3.4* 3.8 4.7   TOTAL BILIRUBIN mg/dL 0.60 0.66 0.81   BILIRUBIN DIRECT mg/dL 0.16  --   --      Results from last 7 days   Lab Units 12/11/23  4340 12/11/23  1044 12/11/23  0757 12/11/23  0609 12/11/23  0411 12/11/23  0205 12/11/23  0019 12/10/23  2211 12/10/23  2011 12/10/23  1755 12/10/23  1609 12/10/23  1402   POC GLUCOSE mg/dl 194* 122 210* 135 126 146* 128 108 189* 154* 219* 146*     Results from last 7 days   Lab Units 12/11/23  0439 12/10/23  1423 12/10/23  0000 12/09/23  2102   GLUCOSE RANDOM mg/dL 127 140 368* 445*         Results from last 7 days   Lab Units 12/10/23  0632   HEMOGLOBIN A1C % 14.0*   EAG mg/dl 355       Results from last 7 days   Lab Units 12/10/23  0721 12/10/23  0525 12/10/23  0320 12/09/23  2230 12/09/23  2102   HS TNI 0HR ng/L  --   --  >62,438*  --  149*   HS TNI 2HR ng/L  --  >32,682*  --  1,348*  --    HSTNI D2 ng/L  --   --   --  1,199*  --    HS TNI 4HR ng/L >22,973*  --   --   --   --          Results from last 7 days   Lab Units 12/11/23  0439 12/11/23  0207 12/10/23  1755 12/09/23  2102   PROTIME seconds 13.9  --   --  12.7   INR  1.08  --   --  0.92   PTT seconds  --  52* 34 23           ED Treatment:   Medication Administration - No Administrations Displayed (No Start Event Found)       None          No past medical history on file.   Present on Admission:   STEMI (ST elevation myocardial infarction) ([address])   HTN (hypertension)   Obesity   Type 2 diabetes mellitus ([address])      Admitting Diagnosis: STEMI (ST elevation myocardial infarction) ([address]) [I21.3]  Age/Sex: 60 y.o. male  Admission Orders:  SCD  Cardio-Pulmonary Monitoring, Neuro Checks, Nursing dysphagia screen, I/O, Daily weights, Vital signs  Tele monitoring    Scheduled Medications:  aspirin, 81 mg, Oral, Daily  atorvastatin, 80 mg, Oral, Daily With Dinner  chlorhexidine, 15 mL, Mouth/Throat, Q12H [address]  heparin (porcine), 5,000 Units, Subcutaneous, Q8H [facility]  metoprolol tartrate, 12.5 mg, Oral, Q12H [place]  pantoprazole, 20 mg, Oral, Early Morning  senna-docusate sodium, 2 tablet, Oral, BID  ticagrelor, 90 mg, Oral, Q12H [address]      Continuous IV Infusions:  insulin regular (HumuLIN R,NovoLIN R) 1 Units/mL in sodium chloride 0.9 % 100 mL infusion, 0.3-21 Units/hr, Intravenous, Titrated  heparin (porcine) 25,000 units in 0.45% NaCl 250 mL infusion (premix)  Rate: 2.7-18 mL/hr Dose: 3-20 Units/kg/hr  Weight Dosing Info: 90 kg (Order-Specific)  Freq: Titrated Route: IV  Last Dose: Stopped (12/11/23 0923)  Start: 12/10/23 1115 End: 12/11/23 1037     PRN Meds:  acetaminophen, 650 mg, Oral, Q6H PRN  nitroglycerin, 0.4 mg, Sublingual, Q5 Min PRN  ondansetron, 4 mg, Intravenous, Q6H PRN        IP CONSULT TO CASE MANAGEMENT  IP CONSULT TO CARDIOLOGY  IP CONSULT TO ENDOCRINOLOGY    Network Utilization Review Department  ATTENTION: Please call with any questions or concerns to [phone] and carefully listen to the prompts so that you are directed to the right person. All voicemails are confidential.   For Discharge needs, contact Care Management DC Support Team at [phone] opt. 2  Send all requests for admission clinical reviews, approved or denied determinations and any other requests to dedicated fax number below belonging to the campus where the patient is receiving treatment.  List of dedicated fax numbers for the Facilities:  [place] DENIALS (Administrative/Medical Necessity) [phone]   DISCHARGE SUPPORT TEAM (NETWORK) [phone] 230[address] [facility] (Maternity/NICU/Pediatrics) [phone]   [address] [place] [address] Drive [phone]   [facility] [phone] 1505 [facility] 207 [facility] [address] [address] [facility] 487 [facility] [address] [place] [phone]

## 2023-12-11 NOTE — PROGRESS NOTES
Cardiac critical care  Cardiology Team 2 Progress Note   Gerardo Williamson 60 y.o. male MRN: [id number]  Unit/Bed#: [facility] 511-01 Encounter: [id number]          Subjective:   Patient seen and examined. No significant events overnight. Underwent LHC today through right radial, no evidence of obstructive CAD. Denies lightheadedness, dizziness, syncope, headache, vision changes, diaphoresis, chest pain, palpitations, shortness of breath, PND, orthopnea, nausea, vomiting, abdominal pain or lower extremity edema. Assessment:  Principal Problem:    STEMI (ST elevation myocardial infarction) ([address])  Active Problems:    HTN (hypertension)    Obesity    Type 2 diabetes mellitus ([address])    Transaminitis    Gerardo Williamson is a 60y.o. year old male with no known medical history, missed PCP visit for 2 years, presented initially to [address] [place] [address] with chest pain. States over the last week he had 4 similar episodes. The last episode is when he had returned from hunting on 12/10/23 and was walking up the stairs from his basement. He had a sudden, retrosternal pain, severe, lasted >3 hours. On arrival to [address] [place] [address] ED EKG showed ST elevations in inferior leads. STEMI alert was called, however given delay in transport, TNK was administered with resolution of his pain. Post TNK EKG shows >50% improvement of his ST elevations. Remained hemodynamically stable and was transferred to [facility] for further management. 1. Inferior STEMI-status post TNK due to transportation delay   - Presented to [facility] ED with >3hours of chest pian, and 4 prior episodes the week preceeding to hospitalization. Noted to have ST segment elevation in inferior leads, with high sensitive troponin peak at >22,000  - Risk factors- Obesity,  HTN, HLD, DM2(new diagnosis), and former smoker (smoking cessation over 20 years ago)  -Left heart cath (12/11/2023)-30% stenosis in mid LAD, 50% and mid circumflex, 40% mid RCA. Successful clinical reperfusion post TNK without obstructive CAD.   -TTE (12/10/2023): Normal LV size, LVEF 55%, hypokinesis of posterior basal segment of the left ventricle, mild MR, otherwise no significant valvular disease.  -Elevated AST level noted  -Currently on aspirin, Brilinta, atorvastatin 80    2. Type 2 diabetes mellitus-new diagnosis  -A1c of 14 on presentation  - Currently on insulin drip   - Endocrinology recommendations appreciated     3. Hyperlipidemia-new diagnosis total cholesterol 209, triglycerides 306, HDL 35, LDL calculated 113    4. Hypertension-known history of, not on medications at home  -Started on Lopressor 12.5 mg twice daily      Plan:  -Continue with aspirin 81 mg, Brilinta 90 mg twice daily, Lopressor 12.5 mg daily, and atorvastatin 80 mg nightly. Will discuss optimizing Lopressor tomorrow to once daily metoprolol succinate if tolerated by BP  -Would benefit from endocrinology consult as an outpatient for diabetes management. Patient needs to be discharged on insulin.  -Nutritionist consult  - Cardiac rehab     Case discussed and reviewed with Dr. Karyle Smoke who agrees with my assessment and plan. Thank you for involving us in the care of your patient. Gabriel Bryant MD  Cardiology Fellow   PGY-5      Vitals: Blood pressure 107/75, pulse 78, temperature 97.7 °F (36.5 °C), temperature source Oral, resp. rate 18, height 5' 7" (1.702 m), weight 94.2 kg (207 lb 10.8 oz), SpO2 98 %. , Body mass index is 32.53 kg/m².,   Orthostatic Blood Pressures      Flowsheet Row Most Recent Value   Blood Pressure 107/75 filed at 12/11/2023 1517              Intake/Output Summary (Last 24 hours) at 12/11/2023 1633  Last data filed at 12/11/2023 1604  Gross per 24 hour   Intake 1483.65 ml   Output 1105 ml   Net 378.65 ml       Review of System:  Review of system was conducted and was negative except for as stated in the subjective course.     Physical Exam:    GEN: Elyse Hale appears well, alert and oriented x 3, pleasant and cooperative   NECK: No JVD or carotid bruits HEART: Regular rhythm, normal rate, normal S1 and S2, no murmurs, clicks, gallops or rubs   LUNGS: Clear to auscultation bilaterally; no wheezes, rales, or rhonchi; respiration nonlabored   ABDOMEN:  Normoactive bowel sounds, soft, no tenderness, no distention  EXTREMITIES: peripheral pulses palpable; no edema. Radial band noted.          Current Facility-Administered Medications:     acetaminophen (TYLENOL) tablet 650 mg, 650 mg, Oral, Q6H PRN, Hoang Duval PA-C, 650 mg at 12/10/23 1017    aspirin (ECOTRIN LOW STRENGTH) EC tablet 81 mg, 81 mg, Oral, Daily, Hoang Duval PA-C, 81 mg at 12/11/23 0802    atorvastatin (LIPITOR) tablet 80 mg, 80 mg, Oral, Daily With David Nichols PA-C, 80 mg at 12/10/23 1604    chlorhexidine (PERIDEX) 0.12 % oral rinse 15 mL, 15 mL, Mouth/Throat, Q12H [address], Zelda Araya PA-C, 15 mL at 12/11/23 0802    heparin (porcine) subcutaneous injection 5,000 Units, 5,000 Units, Subcutaneous, Q8H [address], Hoang Duval PA-C    insulin regular (HumuLIN R,NovoLIN R) 1 Units/mL in sodium chloride 0.9 % 100 mL infusion, 0.3-21 Units/hr, Intravenous, Titrated, Hoang Duval PA-C, Last Rate: 8 mL/hr at 12/11/23 1604, 8 Units/hr at 12/11/23 1604    metoprolol tartrate (LOPRESSOR) partial tablet 12.5 mg, 12.5 mg, Oral, Q12H [address], Hoang Duval PA-C, 12.5 mg at 12/11/23 0802    nitroglycerin (NITROSTAT) SL tablet 0.4 mg, 0.4 mg, Sublingual, Q5 Min PRN, Hoang Duval PA-C    ondansetron (ZOFRAN) injection 4 mg, 4 mg, Intravenous, Q6H PRN, Hoang Duval PA-C    pantoprazole (PROTONIX) EC tablet 20 mg, 20 mg, Oral, Early Morning, Hoang Duval PA-C, 20 mg at 12/11/23 5644    senna-docusate sodium (SENOKOT S) 8.6-50 mg per tablet 2 tablet, 2 tablet, Oral, BID, Hoang Duval, PA-C, 2 tablet at 12/10/23 1753    ticagrelor (BRILINTA) tablet 90 mg, 90 mg, Oral, Q12H [address], Hoang Duval, PA-C, 90 mg at 12/11/23 0802    Labs & Results:  Results from last 7 days   Lab Units 12/10/23  0721 12/10/23  0525 12/10/23  0320   HS TNI 0HR ng/L  --   --  >22,973*   HS TNI 2HR ng/L  --  >22,973*  --    HS TNI 4HR ng/L >22,973*  --   --          Results from last 7 days   Lab Units 12/11/23  0439 12/10/23  1423 12/10/23  0000   POTASSIUM mmol/L 3.5 3.2* 4.5   CO2 mmol/L 27 29 28   CHLORIDE mmol/L 110* 104 101   BUN mg/dL 20 16 17   CREATININE mg/dL 1.07 0.97 1.10     Results from last 7 days   Lab Units 12/11/23  0439 12/10/23  0336 12/09/23  2102   HEMOGLOBIN g/dL 13.0 13.5 15.6   HEMATOCRIT % 38.9 39.1 45.5   PLATELETS Thousands/uL 154 189 198     Results from last 7 days   Lab Units 12/10/23  0632 12/10/23  0000   TRIGLYCERIDES mg/dL  --  306*   HDL mg/dL  --  35*   LDL CALC mg/dL  --  113*   HEMOGLOBIN A1C % 14.0*  --        Telemetry:   Personally reviewed by Azeb Heredia MD: NSR  Imaging: Chest x-ray (12/9/2023): No acute cardiopulmonary disease.

## 2023-12-11 NOTE — PROGRESS NOTES
[facility]  Progress Note  Name: Kimberly Villalta  MRN: [id number]  Unit/Bed#: [facility] 426-95 I Date of Admission: 12/10/2023   Date of Service: 12/11/2023 I Hospital Day: 1    Assessment/Plan   * STEMI (ST elevation myocardial infarction) [facility])  Assessment & Plan  Patient presents to [facility] ED with 3 hours of persistent substernal chest pain. Associated with left arm pain. No SOB, dizziness or palpitations  Found to have inferior ST elevations  MI alert called but due to lack of transpiration, patient was unable to be transferred to cath lab in timely manner  TNK administered with plans for transfer to [facility]  After TNK, patient had relief of his pain    Plan:  ASA and Brilinta  Statin  Heparin infusion  ECHO: EF 55%, hypokinesis of posterobasal segment  Cardiology consult  12/11 cardiac cath    HTN (hypertension)  Assessment & Plan  Patient with a history of HTN  Not taking any home medications  Lopressor 12.5 BID  Goal SBP <130    Obesity  Assessment & Plan  Nutrition counseling  BMI >30    Type 2 diabetes mellitus ([address])  Assessment & Plan  BG >400 on arrival to ED  Hgb A1C 14  Insulin infusion   Will need SQ regimen  Endocrine consult appreciated    Transaminitis  Assessment & Plan  Trend LFTs  Likely due to ACS and transient low flow state         Disposition: Med Surg with Telemetry    ICU Core Measures     A: Assess, Prevent, and Manage Pain Has pain been assessed? Yes  Need for changes to pain regimen? No   B: Both SAT/SAT  N/A   C: Choice of Sedation RASS Goal: N/A patient not on sedation  Need for changes to sedation or analgesia regimen? NA   D: Delirium CAM-ICU: Negative   E: Early Mobility  Plan for early mobility? Yes   F: Family Engagement Plan for family engagement today? Yes       Review of Invasive Devices: Red Plan: Red to be removed.  Order has been placed    Prophylaxis:  VTE VTE covered by:  heparin (porcine), Intravenous, 15.1 Units/kg/hr at 12/10/23 1855  heparin (porcine), Intravenous  heparin (porcine), Intravenous, 4,000 Units at 12/10/23 1856       Stress Ulcer  covered bypantoprazole (PROTONIX) EC tablet 20 mg [[id number]]         Significant 24hr Events     24hr events: Patient with no recurrence of CP. Plans for cardiac cath today. Some episodes of NSVT. Subjective   Review of Systems   Respiratory:  Negative for chest tightness and shortness of breath. Cardiovascular:  Negative for chest pain. All other systems reviewed and are negative. Objective                            Vitals I/O      Most Recent Min/Max in 24hrs   Temp 98.2 °F (36.8 °C) Temp  Min: 98.1 °F (36.7 °C)  Max: 98.6 °F (37 °C)   Pulse 77 Pulse  Min: 68  Max: 97   Resp (!) 24 Resp  Min: 10  Max: 32   BP 94/55 BP  Min: 85/49  Max: 136/79   O2 Sat 93 % SpO2  Min: 92 %  Max: 97 %      Intake/Output Summary (Last 24 hours) at 12/11/2023 0205  Last data filed at 12/11/2023 0000  Gross per 24 hour   Intake 2412.4 ml   Output 1300 ml   Net 1112.4 ml       Diet NPO    Invasive Monitoring           Physical Exam   Physical Exam  Skin:     General: Skin is warm and dry. Cardiovascular:      Rate and Rhythm: Normal rate and regular rhythm. Heart sounds: No murmur heard. No friction rub. Constitutional:       Appearance: He is well-developed and well-nourished. Pulmonary:      Effort: Pulmonary effort is normal.      Breath sounds: Normal breath sounds. No wheezing, rhonchi or rales. Neurological:      General: No focal deficit present. Mental Status: He is alert and oriented to person, place and time. Mental status is at baseline. Genitourinary/Anorectal:  Red present. Diagnostic Studies      No new imaging. ECHO reviewed.       Medications:  Scheduled PRN   aspirin, 81 mg, Daily  atorvastatin, 80 mg, Daily With Dinner  chlorhexidine, 15 mL, Q12H [address]  metoprolol tartrate, 12.5 mg, Q12H [facility]  pantoprazole, 20 mg, Early Morning  senna-docusate sodium, 2 tablet, BID  ticagrelor, 90 mg, Q12H [facility]      acetaminophen, 650 mg, Q6H PRN  heparin (porcine), 2,000 Units, Q6H PRN  heparin (porcine), 4,000 Units, Q6H PRN  ondansetron, 4 mg, Q6H PRN       Continuous    heparin (porcine), 3-20 Units/kg/hr (Order-Specific), Last Rate: 15.1 Units/kg/hr (12/10/23 1855)  insulin regular (HumuLIN R,NovoLIN R) 1 Units/mL in sodium chloride 0.9 % 100 mL infusion, 0.3-21 Units/hr, Last Rate: 1.5 Units/hr (12/11/23 0020)         Labs:    CBC    Recent Labs     12/09/23  2102 12/10/23  0336   WBC 7.51 8.32   HGB 15.6 13.5   HCT 45.5 39.1    189     BMP    Recent Labs     12/10/23  0000 12/10/23  1423   SODIUM 135 139   K 4.5 3.2*    104   CO2 28 29   AGAP 6 6   BUN 17 16   CREATININE 1.10 0.97   CALCIUM 8.7 8.8       Coags    Recent Labs     12/09/23  2102 12/10/23  1755   INR 0.92  --    PTT 23 34        Additional Electrolytes  Recent Labs     12/10/23  0000 12/10/23  0411 12/10/23  1423   MG 1.9  --  2.1   PHOS 4.6*  --   --    CAIONIZED  --  1.15  --           Blood Gas    No recent results  No recent results LFTs  Recent Labs     12/09/23  2102 12/10/23  0000   ALT 46 62*   AST 33 165*   ALKPHOS 82 67   ALB 4.7 3.8   TBILI 0.81 0.66       Infectious  No recent results  Glucose  Recent Labs     12/09/23  2102 12/10/23  0000 12/10/23  1423   GLUC 445* 368* 140             2122 Candie Clemens PA-C

## 2023-12-11 NOTE — UTILIZATION REVIEW
NOTIFICATION OF INPATIENT ADMISSION   AUTHORIZATION REQUEST   SERVICING FACILITY:   [address] [place] [address]  Address: [address] [facility] [address] [address]  Tax ID: [id number]  NPI: [id number] ATTENDING PROVIDER:  Attending Name and NPI#: Jimbo Moss, [id number] [place] [address] [[id number]]  Address: [address] [place] [address], [facility] [address] [facility] [address] [address]  Phone: [phone]   ADMISSION INFORMATION:  Place of Service: Inpatient [address]  Place of Service Code: 21  Inpatient Admission Date/Time: 12/10/23 12:09 AM  Discharge Date/Time: No discharge date for patient encounter. Admitting Diagnosis Code/Description:  STEMI (ST elevation myocardial infarction) ([address]) [I21.3]     UTILIZATION REVIEW CONTACT:  [employer] Utilization   Network Utilization Review Department  Phone: [phone]  Fax: [phone]  Email: [email or web address]  Contact for approvals/pending authorizations, clinical reviews, and discharge. PHYSICIAN ADVISORY SERVICES:  Medical Necessity Denial & [employer] Review  Phone: [phone]  Fax: [phone]  Email: [email or web address]     DISCHARGE SUPPORT TEAM:  For Patients Discharge Needs & Updates  Phone: [phone] opt. 2 Fax: [phone]  Email: [email or web address]

## 2023-12-11 NOTE — PLAN OF CARE
Problem: PAIN - ADULT  Goal: Verbalizes/displays adequate comfort level or baseline comfort level  Description: Interventions:  - Encourage patient to monitor pain and request assistance  - Assess pain using appropriate pain scale  - Administer analgesics based on type and severity of pain and evaluate response  - Implement non-pharmacological measures as appropriate and evaluate response  - Consider cultural and social influences on pain and pain management  - Notify physician/advanced practitioner if interventions unsuccessful or patient reports new pain  Outcome: Progressing     Problem: SAFETY ADULT  Goal: Patient will remain free of falls  Description: INTERVENTIONS:  - Educate patient/family on patient safety including physical limitations  - Instruct patient to call for assistance with activity   - Consult OT/PT to assist with strengthening/mobility   - Keep Call bell within reach  - Keep bed low and locked with side rails adjusted as appropriate  - Keep care items and personal belongings within reach  - Initiate and maintain comfort rounds  - Make Fall Risk Sign visible to staff  - Apply yellow socks and bracelet for high fall risk patients  - Consider moving patient to room near nurses station  Outcome: Progressing  Goal: Maintain or return to baseline ADL function  Description: INTERVENTIONS:  -  Assess patient's ability to carry out ADLs; assess patient's baseline for ADL function and identify physical deficits which impact ability to perform ADLs (bathing, care of mouth/teeth, toileting, grooming, dressing, etc.)  - Assess/evaluate cause of self-care deficits   - Assess range of motion  - Assess patient's mobility; develop plan if impaired  - Assess patient's need for assistive devices and provide as appropriate  - Encourage maximum independence but intervene and supervise when necessary  - Involve family in performance of ADLs  - Assess for home care needs following discharge   - Consider OT consult to assist with ADL evaluation and planning for discharge  - Provide patient education as appropriate  Outcome: Progressing  Goal: Maintains/Returns to pre admission functional level  Description: INTERVENTIONS:  - Perform AM-PAC 6 Click Basic Mobility/ Daily Activity assessment daily.  - Set and communicate daily mobility goal to care team and patient/family/caregiver. - Collaborate with rehabilitation services on mobility goals if consulted  - Out of bed for toileting  - Record patient progress and toleration of activity level   Outcome: Progressing     Problem: DISCHARGE PLANNING  Goal: Discharge to home or other facility with appropriate resources  Description: INTERVENTIONS:  - Identify barriers to discharge w/patient and caregiver  - Arrange for needed discharge resources and transportation as appropriate  - Identify discharge learning needs (meds, wound care, etc.)  - Arrange for interpretive services to assist at discharge as needed  - Refer to Case Management Department for coordinating discharge planning if the patient needs post-hospital services based on physician/advanced practitioner order or complex needs related to functional status, cognitive ability, or social support system  Outcome: Progressing     Problem: Knowledge Deficit  Goal: Patient/family/caregiver demonstrates understanding of disease process, treatment plan, medications, and discharge instructions  Description: Complete learning assessment and assess knowledge base.   Interventions:  - Provide teaching at level of understanding  - Provide teaching via preferred learning methods  Outcome: Progressing     Problem: CARDIOVASCULAR - ADULT  Goal: Maintains optimal cardiac output and hemodynamic stability  Description: INTERVENTIONS:  - Monitor I/O, vital signs and rhythm  - Monitor for S/S and trends of decreased cardiac output  - Administer and titrate ordered vasoactive medications to optimize hemodynamic stability  - Assess quality of pulses, skin color and temperature  - Assess for signs of decreased coronary artery perfusion  - Instruct patient to report change in severity of symptoms  Outcome: Progressing  Goal: Absence of cardiac dysrhythmias or at baseline rhythm  Description: INTERVENTIONS:  - Continuous cardiac monitoring, vital signs, obtain 12 lead EKG if ordered  - Administer antiarrhythmic and heart rate control medications as ordered  - Monitor electrolytes and administer replacement therapy as ordered  Outcome: Progressing     Problem: RESPIRATORY - ADULT  Goal: Achieves optimal ventilation and oxygenation  Description: INTERVENTIONS:  - Assess for changes in respiratory status  - Assess for changes in mentation and behavior  - Position to facilitate oxygenation and minimize respiratory effort  - Oxygen administered by appropriate delivery if ordered  - Initiate smoking cessation education as indicated  - Encourage broncho-pulmonary hygiene including cough, deep breathe, Incentive Spirometry  - Assess the need for suctioning and aspirate as needed  - Assess and instruct to report SOB or any respiratory difficulty  - Respiratory Therapy support as indicated  Outcome: Progressing     Problem: GENITOURINARY - ADULT  Goal: Maintains or returns to baseline urinary function  Description: INTERVENTIONS:  - Assess urinary function  - Encourage oral fluids to ensure adequate hydration if ordered  - Administer IV fluids as ordered to ensure adequate hydration  - Administer ordered medications as needed  - Offer frequent toileting  - Follow urinary retention protocol if ordered  Outcome: Progressing  Goal: Absence of urinary retention  Description: INTERVENTIONS:  - Assess patient’s ability to void and empty bladder  - Monitor I/O  - Bladder scan as needed  - Discuss with physician/AP medications to alleviate retention as needed  - Discuss catheterization for long term situations as appropriate  Outcome: Progressing  Goal: Urinary catheter remains patent  Description: INTERVENTIONS:  - Assess patency of urinary catheter  - If patient has a chronic cohen, consider changing catheter if non-functioning  - Follow guidelines for intermittent irrigation of non-functioning urinary catheter  Outcome: Progressing     Problem: METABOLIC, FLUID AND ELECTROLYTES - ADULT  Goal: Electrolytes maintained within normal limits  Description: INTERVENTIONS:  - Monitor labs and assess patient for signs and symptoms of electrolyte imbalances  - Administer electrolyte replacement as ordered  - Monitor response to electrolyte replacements, including repeat lab results as appropriate  - Instruct patient on fluid and nutrition as appropriate  Outcome: Progressing  Goal: Fluid balance maintained  Description: INTERVENTIONS:  - Monitor labs   - Monitor I/O and WT  - Instruct patient on fluid and nutrition as appropriate  - Assess for signs & symptoms of volume excess or deficit  Outcome: Progressing  Goal: Glucose maintained within target range  Description: INTERVENTIONS:  - Monitor Blood Glucose as ordered  - Assess for signs and symptoms of hyperglycemia and hypoglycemia  - Administer ordered medications to maintain glucose within target range  - Assess nutritional intake and initiate nutrition service referral as needed  Outcome: Progressing     Problem: SKIN/TISSUE INTEGRITY - ADULT  Goal: Skin Integrity remains intact(Skin Breakdown Prevention)  Description: Assess:  -Perform Tor assessment every   -Clean and moisturize skin every   -Inspect skin when repositioning, toileting, and assisting with ADLS  -Assess under medical devices such as  every   -Assess extremities for adequate circulation and sensation     Bed Management:  -Have minimal linens on bed & keep smooth, unwrinkled  -Change linens as needed when moist or perspiring  -Avoid sitting or lying in one position for more than  hours while in bed  -Keep HOB at degrees     Toileting:  -Offer bedside commode  -Assess for incontinence every   -Use incontinent care products after each incontinent episode such as     Activity:  -Mobilize patient  times a day  -Encourage activity and walks on unit  -Encourage or provide ROM exercises   -Turn and reposition patient every  Hours  -Use appropriate equipment to lift or move patient in bed  -Instruct/ Assist with weight shifting every  when out of bed in chair  -Consider limitation of chair time  hour intervals    Skin Care:  -Avoid use of baby powder, tape, friction and shearing, hot water or constrictive clothing  -Relieve pressure over bony prominences using   -Do not massage red bony areas    Next Steps:  -Teach patient strategies to minimize risks such as    -Consider consults to  interdisciplinary teams such as   Outcome: Progressing  Goal: Incision(s), wounds(s) or drain site(s) healing without S/S of infection  Description: INTERVENTIONS  - Assess and document dressing, incision, wound bed, drain sites and surrounding tissue  - Provide patient and family education  - Perform skin care/dressing changes every   Outcome: Progressing  Goal: Pressure injury heals and does not worsen  Description: Interventions:  - Implement low air loss mattress or specialty surface (Criteria met)  - Apply silicone foam dressing  - Instruct/assist with weight shifting every  minutes when in chair   - Limit chair time to  hour intervals  - Use special pressure reducing interventions such as  when in chair   - Apply fecal or urinary incontinence containment device   - Perform passive or active ROM every   - Turn and reposition patient & offload bony prominences every  hours   - Utilize friction reducing device or surface for transfers   - Consider consults to  interdisciplinary teams such as   - Use incontinent care products after each incontinent episode such as   - Consider nutrition services referral as needed  Outcome: Progressing     Problem: HEMATOLOGIC - ADULT  Goal: Maintains hematologic stability  Description: INTERVENTIONS  - Assess for signs and symptoms of bleeding or hemorrhage  - Monitor labs  - Administer supportive blood products/factors as ordered and appropriate  Outcome: Progressing     Problem: MUSCULOSKELETAL - ADULT  Goal: Maintain or return mobility to safest level of function  Description: INTERVENTIONS:  - Assess patient's ability to carry out ADLs; assess patient's baseline for ADL function and identify physical deficits which impact ability to perform ADLs (bathing, care of mouth/teeth, toileting, grooming, dressing, etc.)  - Assess/evaluate cause of self-care deficits   - Assess range of motion  - Assess patient's mobility  - Assess patient's need for assistive devices and provide as appropriate  - Encourage maximum independence but intervene and supervise when necessary  - Involve family in performance of ADLs  - Assess for home care needs following discharge   - Consider OT consult to assist with ADL evaluation and planning for discharge  - Provide patient education as appropriate  Outcome: Progressing  Goal: Maintain proper alignment of affected body part  Description: INTERVENTIONS:  - Support, maintain and protect limb and body alignment  - Provide patient/ family with appropriate education  Outcome: Progressing     Problem: Nutrition/Hydration-ADULT  Goal: Nutrient/Hydration intake appropriate for improving, restoring or maintaining nutritional needs  Description: Monitor and assess patient's nutrition/hydration status for malnutrition. Collaborate with interdisciplinary team and initiate plan and interventions as ordered. Monitor patient's weight and dietary intake as ordered or per policy. Utilize nutrition screening tool and intervene as necessary. Determine patient's food preferences and provide high-protein, high-caloric foods as appropriate.      INTERVENTIONS:  - Monitor oral intake, urinary output, labs, and treatment plans  - Assess nutrition and hydration status and recommend course of action  - Evaluate amount of meals eaten  - Assist patient with eating if necessary   - Allow adequate time for meals  - Recommend/ encourage appropriate diets, oral nutritional supplements, and vitamin/mineral supplements  - Order, calculate, and assess calorie counts as needed  - Recommend, monitor, and adjust tube feedings and TPN/PPN based on assessed needs  - Assess need for intravenous fluids  - Provide specific nutrition/hydration education as appropriate  - Include patient/family/caregiver in decisions related to nutrition  Outcome: Progressing

## 2023-12-11 NOTE — PROGRESS NOTES
Critical Care Interval Transfer Note:    Please refer to progress note from earlier today for full details. Barriers to discharge:   Adjustment of insulin regimen  Tolerate beta blocker     Consults: IP CONSULT TO CASE MANAGEMENT  IP CONSULT TO CARDIOLOGY  IP CONSULT TO ENDOCRINOLOGY       Discharge Plan: Anticipate discharge tomorrow to home. Red Plan: Red to be removed. Order has been placed      Patient seen and evaluated by Critical Care today and deemed to be appropriate for transfer to [facility] with Telemetry. Spoke to Dr. Monika Willis from [facility] to accept transfer. Critical care can be contacted via [employer] with any questions or concerns.

## 2023-12-11 NOTE — ASSESSMENT & PLAN NOTE
BG >400 on arrival to ED  Hgb A1C 14  Insulin infusion   Will need SQ regimen  Endocrine consult appreciated

## 2023-12-12 VITALS
RESPIRATION RATE: 18 BRPM | HEIGHT: 67 IN | TEMPERATURE: 98.2 F | DIASTOLIC BLOOD PRESSURE: 91 MMHG | OXYGEN SATURATION: 96 % | WEIGHT: 207.45 LBS | SYSTOLIC BLOOD PRESSURE: 126 MMHG | HEART RATE: 75 BPM | BODY MASS INDEX: 32.56 KG/M2

## 2023-12-12 LAB
ANION GAP SERPL CALCULATED.3IONS-SCNC: 9 MMOL/L
BUN SERPL-MCNC: 18 MG/DL (ref 5–25)
CALCIUM SERPL-MCNC: 8.4 MG/DL (ref 8.4–10.2)
CHLORIDE SERPL-SCNC: 104 MMOL/L (ref 96–108)
CO2 SERPL-SCNC: 22 MMOL/L (ref 21–32)
CREAT SERPL-MCNC: 0.97 MG/DL (ref 0.6–1.3)
ERYTHROCYTE [DISTWIDTH] IN BLOOD BY AUTOMATED COUNT: 12.8 % (ref 11.6–15.1)
GFR SERPL CREATININE-BSD FRML MDRD: 85 ML/MIN/1.73SQ M
GLUCOSE SERPL-MCNC: 160 MG/DL (ref 65–140)
GLUCOSE SERPL-MCNC: 173 MG/DL (ref 65–140)
GLUCOSE SERPL-MCNC: 183 MG/DL (ref 65–140)
GLUCOSE SERPL-MCNC: 223 MG/DL (ref 65–140)
HCT VFR BLD AUTO: 40.1 % (ref 36.5–49.3)
HGB BLD-MCNC: 13.3 G/DL (ref 12–17)
MAGNESIUM SERPL-MCNC: 1.8 MG/DL (ref 1.9–2.7)
MCH RBC QN AUTO: 29.4 PG (ref 26.8–34.3)
MCHC RBC AUTO-ENTMCNC: 33.2 G/DL (ref 31.4–37.4)
MCV RBC AUTO: 89 FL (ref 82–98)
PLATELET # BLD AUTO: 156 THOUSANDS/UL (ref 149–390)
PMV BLD AUTO: 9.8 FL (ref 8.9–12.7)
POTASSIUM SERPL-SCNC: 3.8 MMOL/L (ref 3.5–5.3)
RBC # BLD AUTO: 4.52 MILLION/UL (ref 3.88–5.62)
SODIUM SERPL-SCNC: 135 MMOL/L (ref 135–147)
WBC # BLD AUTO: 5.7 THOUSAND/UL (ref 4.31–10.16)

## 2023-12-12 PROCEDURE — 99239 HOSP IP/OBS DSCHRG MGMT >30: CPT | Performed by: FAMILY MEDICINE

## 2023-12-12 PROCEDURE — 82948 REAGENT STRIP/BLOOD GLUCOSE: CPT

## 2023-12-12 PROCEDURE — 80048 BASIC METABOLIC PNL TOTAL CA: CPT | Performed by: PHYSICIAN ASSISTANT

## 2023-12-12 PROCEDURE — 85027 COMPLETE CBC AUTOMATED: CPT | Performed by: PHYSICIAN ASSISTANT

## 2023-12-12 PROCEDURE — 83735 ASSAY OF MAGNESIUM: CPT | Performed by: PHYSICIAN ASSISTANT

## 2023-12-12 PROCEDURE — 99232 SBSQ HOSP IP/OBS MODERATE 35: CPT | Performed by: INTERNAL MEDICINE

## 2023-12-12 RX ORDER — ATORVASTATIN CALCIUM 80 MG/1
80 TABLET, FILM COATED ORAL
Qty: 30 TABLET | Refills: 0 | Status: SHIPPED | OUTPATIENT
Start: 2023-12-12 | End: 2023-12-21 | Stop reason: SDUPTHER

## 2023-12-12 RX ORDER — GLUCOSAMINE HCL/CHONDROITIN SU 500-400 MG
CAPSULE ORAL
Qty: 200 EACH | Refills: 0 | Status: SHIPPED | OUTPATIENT
Start: 2023-12-12

## 2023-12-12 RX ORDER — PANTOPRAZOLE SODIUM 20 MG/1
20 TABLET, DELAYED RELEASE ORAL
Qty: 30 TABLET | Refills: 0 | Status: SHIPPED | OUTPATIENT
Start: 2023-12-13 | End: 2023-12-21 | Stop reason: SDUPTHER

## 2023-12-12 RX ORDER — NITROGLYCERIN 0.4 MG/1
0.4 TABLET SUBLINGUAL
Qty: 30 TABLET | Refills: 0 | Status: SHIPPED | OUTPATIENT
Start: 2023-12-12

## 2023-12-12 RX ORDER — BLOOD SUGAR DIAGNOSTIC
STRIP MISCELLANEOUS
Qty: 200 EACH | Refills: 0 | Status: SHIPPED | OUTPATIENT
Start: 2023-12-12

## 2023-12-12 RX ORDER — BLOOD-GLUCOSE METER
KIT MISCELLANEOUS
Qty: 1 KIT | Refills: 0 | Status: SHIPPED | OUTPATIENT
Start: 2023-12-12

## 2023-12-12 RX ORDER — PEN NEEDLE, DIABETIC 32GX 5/32"
NEEDLE, DISPOSABLE MISCELLANEOUS
Qty: 100 EACH | Refills: 0 | Status: SHIPPED | OUTPATIENT
Start: 2023-12-12 | End: 2023-12-12 | Stop reason: SDUPTHER

## 2023-12-12 RX ORDER — METOPROLOL SUCCINATE 25 MG/1
25 TABLET, EXTENDED RELEASE ORAL DAILY
Qty: 30 TABLET | Refills: 0 | Status: SHIPPED | OUTPATIENT
Start: 2023-12-13 | End: 2023-12-21 | Stop reason: SDUPTHER

## 2023-12-12 RX ORDER — METOPROLOL SUCCINATE 25 MG/1
25 TABLET, EXTENDED RELEASE ORAL DAILY
Status: DISCONTINUED | OUTPATIENT
Start: 2023-12-12 | End: 2023-12-12 | Stop reason: HOSPADM

## 2023-12-12 RX ORDER — PEN NEEDLE, DIABETIC 32GX 5/32"
NEEDLE, DISPOSABLE MISCELLANEOUS
Qty: 100 EACH | Refills: 0 | Status: SHIPPED | OUTPATIENT
Start: 2023-12-12

## 2023-12-12 RX ORDER — LANCETS 33 GAUGE
EACH MISCELLANEOUS
Qty: 200 EACH | Refills: 0 | Status: SHIPPED | OUTPATIENT
Start: 2023-12-12

## 2023-12-12 RX ORDER — INSULIN LISPRO 100 [IU]/ML
10 INJECTION, SOLUTION INTRAVENOUS; SUBCUTANEOUS
Qty: 9 ML | Refills: 0 | Status: SHIPPED | OUTPATIENT
Start: 2023-12-12 | End: 2024-01-11

## 2023-12-12 RX ORDER — INSULIN GLARGINE 100 [IU]/ML
30 INJECTION, SOLUTION SUBCUTANEOUS
Qty: 9 ML | Refills: 0 | Status: SHIPPED | OUTPATIENT
Start: 2023-12-12 | End: 2024-01-11

## 2023-12-12 RX ADMIN — INSULIN LISPRO 2 UNITS: 100 INJECTION, SOLUTION INTRAVENOUS; SUBCUTANEOUS at 11:09

## 2023-12-12 RX ADMIN — ASPIRIN 81 MG: 81 TABLET, COATED ORAL at 08:28

## 2023-12-12 RX ADMIN — INSULIN LISPRO 10 UNITS: 100 INJECTION, SOLUTION INTRAVENOUS; SUBCUTANEOUS at 08:23

## 2023-12-12 RX ADMIN — PANTOPRAZOLE SODIUM 20 MG: 20 TABLET, DELAYED RELEASE ORAL at 05:10

## 2023-12-12 RX ADMIN — METOPROLOL SUCCINATE 25 MG: 25 TABLET, EXTENDED RELEASE ORAL at 08:28

## 2023-12-12 RX ADMIN — TICAGRELOR 90 MG: 90 TABLET ORAL at 08:28

## 2023-12-12 RX ADMIN — HEPARIN SODIUM 5000 UNITS: 5000 INJECTION INTRAVENOUS; SUBCUTANEOUS at 05:10

## 2023-12-12 RX ADMIN — INSULIN LISPRO 10 UNITS: 100 INJECTION, SOLUTION INTRAVENOUS; SUBCUTANEOUS at 11:09

## 2023-12-12 RX ADMIN — INSULIN LISPRO 1 UNITS: 100 INJECTION, SOLUTION INTRAVENOUS; SUBCUTANEOUS at 08:23

## 2023-12-12 NOTE — PROGRESS NOTES
Cardiac critical care  Cardiology Team 2 Progress Note   Crayl Green 60 y.o. male MRN: [id number]  Unit/Bed#: [facility] 511-01 Encounter: [id number]          Subjective:   Patient seen and examined. No significant events overnight. Endocronology recommended Lantus 30us QHS, and Humalog 10U before meals. FBG this . Assessment:  Principal Problem:    STEMI (ST elevation myocardial infarction) ([address])  Active Problems:    HTN (hypertension)    Obesity    Type 2 diabetes mellitus ([address])    Transaminitis    Caryl Green is a 60y.o. year old male with no known medical history, missed PCP visit for 2 years, presented initially to [address] [place] [address] with chest pain. States over the last week he had 4 similar episodes. The last episode is when he had returned from hunting on 12/10/23 and was walking up the stairs from his basement. He had a sudden, retrosternal pain, severe, lasted >3 hours. On arrival to [address] [place] [address] ED EKG showed ST elevations in inferior leads. STEMI alert was called, however given delay in transport, TNK was administered with resolution of his pain. Post TNK EKG shows >50% improvement of his ST elevations. Remained hemodynamically stable and was transferred to [facility] for further management. Plan:  - Metoprolol tartrate 12.5mg BID was switched to Metoprolol Succinate 25mg QD   - Continue with aspirin 81 mg, Brilinta 90 mg twice daily, and atorvastatin 80 mg nightly. - Cardiac rehab referral   - Outpatient follow up with Cardiology- reached out to [facility]. to set up an appointment   - Rest of care as per primary team. Can be discharged today. 1. Inferior STEMI-status post TNK due to transportation delay   - Presented to [facility] ED with >3hours of chest pian, and 4 prior episodes the week preceeding to hospitalization.   Noted to have ST segment elevation in inferior leads, with high sensitive troponin peak at >22,000  - Risk factors- Obesity,  HTN, HLD, DM2(new diagnosis), and former smoker (smoking cessation over 20 years ago)  -Left heart cath (12/11/2023)-30% stenosis in mid LAD, 50% and mid circumflex, 40% mid RCA. Successful clinical reperfusion post TNK without obstructive CAD. -TTE (12/10/2023): Normal LV size, LVEF 55%, hypokinesis of posterior basal segment of the left ventricle, mild MR, otherwise no significant valvular disease.  -Elevated AST level noted  -Currently on aspirin, Brilinta, atorvastatin 80    2. Type 2 diabetes mellitus-new diagnosis  -A1c of 14 on presentation  - Currently on insulin drip   - Endocrinology recommendations appreciated     3. Hyperlipidemia-new diagnosis total cholesterol 209, triglycerides 306, HDL 35, LDL calculated 113    4. Hypertension-known history of, not on medications at home  -Started on Lopressor 12.5 mg twice daily        Case discussed and reviewed with Dr. Dominik Hawk who agrees with my assessment and plan. Thank you for involving us in the care of your patient. Jese Paz MD  Cardiology Fellow   PGY-5      Vitals: Blood pressure 111/75, pulse 81, temperature 97.8 °F (36.6 °C), resp. rate 19, height 5' 7" (1.702 m), weight 94.1 kg (207 lb 7.3 oz), SpO2 96 %. , Body mass index is 32.49 kg/m².,   Orthostatic Blood Pressures      Flowsheet Row Most Recent Value   Blood Pressure 111/75 filed at 12/12/2023 0707   Patient Position - Orthostatic VS Lying filed at 12/11/2023 2151              Intake/Output Summary (Last 24 hours) at 12/12/2023 0711  Last data filed at 12/12/2023 0447  Gross per 24 hour   Intake 918.08 ml   Output 920 ml   Net -1.92 ml         Review of System:  Review of system was conducted and was negative except for as stated in the subjective course.     Physical Exam:    GEN: Rojelio Little appears well, alert and oriented x 3, pleasant and cooperative   NECK: No JVD or carotid bruits   HEART: Regular rhythm, normal rate, normal S1 and S2, no murmurs, clicks, gallops or rubs   LUNGS: Clear to auscultation bilaterally; no wheezes, rales, or rhonchi; respiration nonlabored   ABDOMEN:  Normoactive bowel sounds, soft, no tenderness, no distention  EXTREMITIES: peripheral pulses palpable; no edema. Radial band noted.          Current Facility-Administered Medications:     acetaminophen (TYLENOL) tablet 650 mg, 650 mg, Oral, Q6H PRN, Love Gerber PA-C, 650 mg at 12/10/23 1017    aspirin (ECOTRIN LOW STRENGTH) EC tablet 81 mg, 81 mg, Oral, Daily, Love Gerber PA-C, 81 mg at 12/11/23 0802    atorvastatin (LIPITOR) tablet 80 mg, 80 mg, Oral, Daily With Daily Moe PA-C, 80 mg at 12/11/23 1755    chlorhexidine (PERIDEX) 0.12 % oral rinse 15 mL, 15 mL, Mouth/Throat, Q12H [facility], Zelda Araya PA-C, 15 mL at 12/11/23 0802    heparin (porcine) subcutaneous injection 5,000 Units, 5,000 Units, Subcutaneous, Q8H [facility], Love Gerber PA-C, 5,000 Units at 12/12/23 0510    insulin glargine (LANTUS) subcutaneous injection 30 Units 0.3 mL, 30 Units, Subcutaneous, HS, Johnny Flores MD, 30 Units at 12/11/23 2226    insulin lispro (HumaLOG) 100 units/mL subcutaneous injection 1-5 Units, 1-5 Units, Subcutaneous, HS, Johnny Flores MD    insulin lispro (HumaLOG) 100 units/mL subcutaneous injection 1-6 Units, 1-6 Units, Subcutaneous, TID AC **AND** Fingerstick Glucose (POCT), , , TID AC, Johnny Thomas MD    insulin lispro (HumaLOG) 100 units/mL subcutaneous injection 10 Units, 10 Units, Subcutaneous, TID With Meals, Johnny Flores MD    metoprolol tartrate (LOPRESSOR) partial tablet 12.5 mg, 12.5 mg, Oral, Q12H [facility], Love Gerber PA-C, 12.5 mg at 12/11/23 2226    nitroglycerin (NITROSTAT) SL tablet 0.4 mg, 0.4 mg, Sublingual, Q5 Min PRN, Love Gerber PA-C    ondansetron (ZOFRAN) injection 4 mg, 4 mg, Intravenous, Q6H PRN, Love Gerber PA-HERRERA    pantoprazole (PROTONIX) EC tablet 20 mg, 20 mg, Oral, Early Morning, Love Gerber PA-C, 20 mg at 12/12/23 0510    senna-docusate sodium (SENOKOT S) 8.6-50 mg per tablet 2 tablet, 2 tablet, Oral, BID, [facility], PA-C, 2 tablet at 12/11/23 1756    ticagrelor (BRILINTA) tablet 90 mg, 90 mg, Oral, Q12H [facility], [facility], PA-C, 90 mg at 12/11/23 2226    Labs & Results:  Results from last 7 days   Lab Units 12/10/23  0721 12/10/23  0525 12/10/23  0320   HS TNI 0HR ng/L  --   --  >22,973*   HS TNI 2HR ng/L  --  >22,973*  --    HS TNI 4HR ng/L >22,973*  --   --            Results from last 7 days   Lab Units 12/12/23  0445 12/11/23  0439 12/10/23  1423   POTASSIUM mmol/L 3.8 3.5 3.2*   CO2 mmol/L 22 27 29   CHLORIDE mmol/L 104 110* 104   BUN mg/dL 18 20 16   CREATININE mg/dL 0.97 1.07 0.97       Results from last 7 days   Lab Units 12/12/23  0445 12/11/23  0439 12/10/23  0336   HEMOGLOBIN g/dL 13.3 13.0 13.5   HEMATOCRIT % 40.1 38.9 39.1   PLATELETS Thousands/uL 156 154 189       Results from last 7 days   Lab Units 12/10/23  0632 12/10/23  0000   TRIGLYCERIDES mg/dL  --  306*   HDL mg/dL  --  35*   LDL CALC mg/dL  --  113*   HEMOGLOBIN A1C % 14.0*  --          Telemetry:   Personally reviewed by Maria Del Carmen Zabala MD: NSR  Imaging: Chest x-ray (12/9/2023): No acute cardiopulmonary disease.

## 2023-12-12 NOTE — DISCHARGE INSTR - AVS FIRST PAGE
Monitor blood sugar before meals and at bedtime.  Keep a log and take the log to the primary care physician at the time of appointment  Need an outpatient follow-up with PCP or an endocrinologist. Hold meal time insulin if you are skipping a meal.     Please do not stop your aspirin and Brilinta unless instructed by your cardiologist-please follow-up with cardiologist as an outpatient

## 2023-12-13 ENCOUNTER — TELEPHONE (OUTPATIENT)
Dept: CARDIOLOGY CLINIC | Facility: CLINIC | Age: 58
End: 2023-12-13

## 2023-12-13 NOTE — ASSESSMENT & PLAN NOTE
Lab Results   Component Value Date    HGBA1C 14.0 (H) 12/10/2023       Recent Labs     12/11/23  2224 12/12/23  0005 12/12/23  0604 12/12/23  1048   POCGLU 136 160* 173* 223*       Blood Sugar Average: Last 72 hrs:  (P) [id number]  2 diagnosis of type 2 diabetes mellitus. Patient was on insulin in the hospital.  Plan is to discharge home with insulin and with outpatient follow-up with PCP or endocrinology. Discussed with endocrinology continue with 30 of Lantus nightly with 10 units 3 times daily with meals. Patient was provided prescription for the insulin and also glucometer.   Requested to check blood sugar before meals and at bedtime

## 2023-12-13 NOTE — TELEPHONE ENCOUNTER
----- Message from Robb Loaiza MD sent at 12/12/2023 11:32 AM EST -----  Patient needs a hospital follow up post MI. 1-2 weeks.

## 2023-12-13 NOTE — DISCHARGE SUMMARY
[address] [facility]  Discharge- Arie Kerr 1965, 60 y.o. male MRN: [id number]  Unit/Bed#: [facility] 511-01 Encounter: [id number]  Primary Care Provider: Daxa Morris MD   Date and time admitted to hospital: 12/10/2023 12:09 AM    * STEMI (ST elevation myocardial infarction) [facility])  Assessment & Plan  Patient initially noted to [facility] with chest pain. Exertional chest pain. On arrival to [address] EKG showed ST elevation in inferior leads. STEMI alert was called patient was given TNK due to delay in transport with resolution of his pain. Patient was transferred to [address] [place] [address] and was taken to cardiac catheterization. Left heart cath showed 30% stenosis of mid LAD 50% mid circumflex and 40% mid RCA and no evidence of obstructive CAD requiring PCI or stent. Showing successful reperfusion post TNK administration. Patient was currently on aspirin Brilinta and Lipitor  Out patient follow-up with cardiology    Transaminitis  Assessment & Plan  Downtrending. Outpatient follow-up with PCP    Type 2 diabetes mellitus [facility])  Assessment & Plan  Lab Results   Component Value Date    HGBA1C 14.0 (H) 12/10/2023       Recent Labs     12/11/23  2224 12/12/23  0005 12/12/23  0604 12/12/23  1048   POCGLU 136 160* 173* 223*       Blood Sugar Average: Last 72 hrs:  (P) [id number]  2 diagnosis of type 2 diabetes mellitus. Patient was on insulin in the hospital.  Plan is to discharge home with insulin and with outpatient follow-up with PCP or endocrinology. Discussed with endocrinology continue with 30 of Lantus nightly with 10 units 3 times daily with meals. Patient was provided prescription for the insulin and also glucometer. Requested to check blood sugar before meals and at bedtime    Obesity  Assessment & Plan  BMP 32.49.   Recommended diet and exercise    HTN (hypertension)  Assessment & Plan  Continue with metoprolol      Medical Problems       Resolved Problems  Date Reviewed: 12/12/2023   None       Discharging Physician / Practitioner: Erendira Whitfield MD  PCP: Hany Marie MD  Admission Date:   Admission Orders (From admission, onward)       Ordered        12/10/23 0010  Inpatient Admission  Once                          Discharge Date: 12/12/23    Consultations During Hospital Stay:  Cardiology  Critical care  Endocrinology    Procedures Performed:   Cardiac catheterization-status post thrombolytic therapy for inferior STEMI with successful clinical reperfusion. No significant obstructive disease requiring PCI    Significant Findings / Test Results:   Chest x-ray-no acute Pulmonary disease  . Left ventricular size is normal ejection fraction is 55%. No significant valvular abnormality    Incidental Findings:       Test Results Pending at Discharge (will require follow up): Outpatient Tests Requested:      Complications:  none    Reason for Admission: STEMI    Hospital Course:   Rima Leonard is a 60 y.o. male patient who originally presented to the hospital on 12/10/2023 due to chest pain. This is a 23-year-old male who presented initially to [address] [place] [address] with a chest pressure and associated left arm pain. Patient had multiple episodes of similar pain in the past week. Due to persistent pain he presented to the hospital at that time patient noted to have STEMI. An MI alert was called. Unfortunately transport was unable to be arranged in a timely manner and patient was given TNKase with resolution of symptoms. Patient was transferred to [facility] under critical care and was admitted under critical care. Patient was taken to cardiac cath. Noted to have clinical reperfusion and no evidence of obstructive coronary artery disease. Patient was started on aspirin and Brilinta. Patient also noted to have new onset of type 2 diabetes with hemoglobin A1c of 14. Started on insulin. Was evaluated by endocrinology.   Will be discharging home with insulin with outpatient follow-up. Cardiology cleared for discharge. Patient was discharged in a stable condition on 12/2/2023. For details refer to the chart        Please see above list of diagnoses and related plan for additional information. Condition at Discharge: good    Discharge Day Visit / Exam:   Subjective: Patient seen and examined. No events overnight. Patient wants to go home. Wife in the room  Vitals: Blood Pressure: 126/91 (12/12/23 1100)  Pulse: 75 (12/12/23 1100)  Temperature: 98.2 °F (36.8 °C) (12/12/23 1050)  Temp Source: Oral (12/11/23 2151)  Respirations: 18 (12/12/23 1050)  Height: 5' 7" (170.2 cm) (12/10/23 1100)  Weight - Scale: 94.1 kg (207 lb 7.3 oz) (12/12/23 0533)  SpO2: 96 % (12/12/23 1100)  Exam:   Physical Exam  Constitutional:       Appearance: Normal appearance. HENT:      Head: Normocephalic. Nose: Nose normal.   Eyes:      General: No scleral icterus. Cardiovascular:      Rate and Rhythm: Normal rate and regular rhythm. Pulses: Normal pulses. Pulmonary:      Effort: Pulmonary effort is normal. No respiratory distress. Breath sounds: Normal breath sounds. Abdominal:      General: Abdomen is flat. Musculoskeletal:         General: Normal range of motion. Skin:     General: Skin is warm. Coloration: Skin is not jaundiced. Neurological:      Mental Status: He is alert. Mental status is at baseline. Discussion with Family: Updated  (wife) at bedside. Discharge instructions/Information to patient and family:   See after visit summary for information provided to patient and family. Provisions for Follow-Up Care:  See after visit summary for information related to follow-up care and any pertinent home health orders.       Mobility at time of Discharge:   Basic Mobility Inpatient Raw Score: 24  JH-HLM Goal: 8: Walk 250 feet or more  JH-HLM Achieved: 8: Walk 250 feet ot more       Disposition:   Home    Planned Readmission: none     Discharge Statement:  I spent 45  minutes discharging the patient. This time was spent on the day of discharge. I had direct contact with the patient on the day of discharge. Greater than 50% of the total time was spent examining patient, answering all patient questions, arranging and discussing plan of care with patient as well as directly providing post-discharge instructions. Additional time then spent on discharge activities. Discharge Medications:  See after visit summary for reconciled discharge medications provided to patient and/or family.       **Please Note: This note may have been constructed using a voice recognition system**

## 2023-12-13 NOTE — ASSESSMENT & PLAN NOTE
Patient initially noted to [facility] with chest pain. Exertional chest pain. On arrival to [address] EKG showed ST elevation in inferior leads. STEMI alert was called patient was given TNK due to delay in transport with resolution of his pain. Patient was transferred to [facility] and was taken to cardiac catheterization. Left heart cath showed 30% stenosis of mid LAD 50% mid circumflex and 40% mid RCA and no evidence of obstructive CAD requiring PCI or stent. Showing successful reperfusion post TNK administration.   Patient was currently on aspirin Brilinta and Lipitor  Out patient follow-up with cardiology

## 2023-12-13 NOTE — UTILIZATION REVIEW
NOTIFICATION OF ADMISSION DISCHARGE   This is a Notification of Discharge from [address]. Please be advised that this patient has been discharge from our facility. Below you will find the admission and discharge date and time including the patient’s disposition. UTILIZATION REVIEW CONTACT:  Chelsea Bustamante  Utilization   Network Utilization Review Department  Phone: [phone] x carefully listen to the prompts. All voicemails are confidential.  Email: [email or web address]     ADMISSION INFORMATION  PRESENTATION DATE: 12/10/2023 12:09 AM  OBERVATION ADMISSION DATE:   INPATIENT ADMISSION DATE: 12/10/23 12:09 AM   DISCHARGE DATE: 12/12/2023  2:26 PM   DISPOSITION:Home/Self Care    Network Utilization Review Department  ATTENTION: Please call with any questions or concerns to [phone] and carefully listen to the prompts so that you are directed to the right person. All voicemails are confidential.   For Discharge needs, contact Care Management DC Support Team at [phone] opt. 2  Send all requests for admission clinical reviews, approved or denied determinations and any other requests to dedicated fax number below belonging to the campus where the patient is receiving treatment.  List of dedicated fax numbers for the Facilities:  [place] DENIALS (Administrative/Medical Necessity) [phone]   DISCHARGE SUPPORT TEAM (Network) [phone] [address] [facility] (Maternity/NICU/Pediatrics) [phone]   [address] [facility] [address] [place] [address] [phone]   [facility] [place] [phone]   [address] [place] [address]  [address]  [phone]

## 2023-12-21 ENCOUNTER — OFFICE VISIT (OUTPATIENT)
Dept: CARDIOLOGY CLINIC | Facility: CLINIC | Age: 58
End: 2023-12-21
Payer: COMMERCIAL

## 2023-12-21 VITALS
HEART RATE: 68 BPM | WEIGHT: 210 LBS | SYSTOLIC BLOOD PRESSURE: 124 MMHG | DIASTOLIC BLOOD PRESSURE: 86 MMHG | TEMPERATURE: 97.6 F | OXYGEN SATURATION: 95 % | BODY MASS INDEX: 30.06 KG/M2 | HEIGHT: 70 IN

## 2023-12-21 DIAGNOSIS — E11.65 TYPE 2 DIABETES MELLITUS WITH HYPERGLYCEMIA, WITHOUT LONG-TERM CURRENT USE OF INSULIN (HCC): ICD-10-CM

## 2023-12-21 DIAGNOSIS — R74.01 TRANSAMINITIS: ICD-10-CM

## 2023-12-21 DIAGNOSIS — I25.10 CORONARY ARTERY DISEASE INVOLVING NATIVE CORONARY ARTERY OF NATIVE HEART WITHOUT ANGINA PECTORIS: Primary | ICD-10-CM

## 2023-12-21 DIAGNOSIS — K21.9 GASTROESOPHAGEAL REFLUX DISEASE WITHOUT ESOPHAGITIS: ICD-10-CM

## 2023-12-21 DIAGNOSIS — I25.2 HISTORY OF ST ELEVATION MYOCARDIAL INFARCTION (STEMI): ICD-10-CM

## 2023-12-21 DIAGNOSIS — I10 PRIMARY HYPERTENSION: ICD-10-CM

## 2023-12-21 DIAGNOSIS — E66.09 CLASS 1 OBESITY DUE TO EXCESS CALORIES WITHOUT SERIOUS COMORBIDITY WITH BODY MASS INDEX (BMI) OF 30.0 TO 30.9 IN ADULT: ICD-10-CM

## 2023-12-21 PROCEDURE — 99214 OFFICE O/P EST MOD 30 MIN: CPT | Performed by: NURSE PRACTITIONER

## 2023-12-21 PROCEDURE — 93000 ELECTROCARDIOGRAM COMPLETE: CPT | Performed by: NURSE PRACTITIONER

## 2023-12-21 RX ORDER — METOPROLOL SUCCINATE 25 MG/1
25 TABLET, EXTENDED RELEASE ORAL DAILY
Qty: 90 TABLET | Refills: 3 | Status: SHIPPED | OUTPATIENT
Start: 2023-12-21

## 2023-12-21 RX ORDER — PANTOPRAZOLE SODIUM 20 MG/1
20 TABLET, DELAYED RELEASE ORAL
Qty: 90 TABLET | Refills: 3 | Status: SHIPPED | OUTPATIENT
Start: 2023-12-21

## 2023-12-21 RX ORDER — ATORVASTATIN CALCIUM 80 MG/1
80 TABLET, FILM COATED ORAL
Qty: 90 TABLET | Refills: 3 | Status: SHIPPED | OUTPATIENT
Start: 2023-12-21

## 2023-12-21 NOTE — ASSESSMENT & PLAN NOTE
Lab Results   Component Value Date    HGBA1C 14.0 (H) 12/10/2023     New diagnosis.  Awaiting consult with [facility] endocrinology.  Now on Lantus and humolog.  I reviewed importance of monitoring blood sugars with use of short acting insulin. He will contact his PCP for sugars < 80.  Should have eye exam given vision blurring.

## 2023-12-21 NOTE — PATIENT INSTRUCTIONS
Aspirin and Brilinta are very important for preventing clot formation. Please continue for 1 year wtihout stopping. Contact our office if anyone asks you to hold this medication. After 1 year we will stop the Brilinta and continue aspirin lifelong.  Atorvastatin is for lowering cholesterol with a goal LDL < 55. We will recheck your cholesterol in 2-3 months (complete with labs for endocrinology)  Contact our office if any recurrent angina, unusual bleeding, or any other concerns.  Brilinta may cause shortness of breath. We will monitor.  Contact the PCP if sugars are < 80 or > 180. I will reach out to Dr. Lynne.  I recommend an eye exam.   Referral to cardiac rehab at [facility] in [place]

## 2023-12-21 NOTE — ASSESSMENT & PLAN NOTE
A. Inferior STEMI 12/9/23.  B. Cardiac cath 12/11/2023: s/p thrombolytic therapy with successful clinical reperfusion. Residual MLI in LM, 30% LAD(m), 50% Lcx(m), 40% RCA(m).  C. Echo 12/10/23 with EF 55% and hypokinesis of the posterobasal wall.  - - - - -  Patient is doing well. No recurrent anginal complaints.  I reviewed the importance of DAPT x 1 year. Continue Asa 81 mg and Brilinta 90mg BID. No stopping without permission from our office.  Atorvastatin 80 mg daily for goal LDL < 55.  Metoprolol succinate 25 mg daily.  Cardiac rehab - referral provided, ok to schedule at [facility] in [place].  We reviewed urgent s/s to report.  Will monitor closely.

## 2023-12-21 NOTE — PROGRESS NOTES
Cardiology Follow Up    Brent Hall  1965  [id number]  [facility] CARDIOVASC PHYS  [address]  [facility] [address] [phone]  [phone]    Brent presents for follow up from his hospitalization for inferior STEMI.    1. Coronary artery disease involving native coronary artery of native heart without angina pectoris  Assessment & Plan:  A. Inferior STEMI 12/9/23.  B. Cardiac cath 12/11/2023: s/p thrombolytic therapy with successful clinical reperfusion. Residual MLI in LM, 30% LAD(m), 50% Lcx(m), 40% RCA(m).  C. Echo 12/10/23 with EF 55% and hypokinesis of the posterobasal wall.  - - - - -  Patient is doing well. No recurrent anginal complaints.  I reviewed the importance of DAPT x 1 year. Continue Asa 81 mg and Brilinta 90mg BID. No stopping without permission from our office.  Atorvastatin 80 mg daily for goal LDL < 55.  Metoprolol succinate 25 mg daily.  Cardiac rehab - referral provided, ok to schedule at [facility] in [place].  We reviewed urgent s/s to report.  Will monitor closely.    Orders:  -     POCT ECG  -     aspirin (ECOTRIN LOW STRENGTH) 81 mg EC tablet; Take 1 tablet (81 mg total) by mouth daily  -     atorvastatin (LIPITOR) 80 mg tablet; Take 1 tablet (80 mg total) by mouth daily with dinner  -     metoprolol succinate (TOPROL-XL) 25 mg 24 hr tablet; Take 1 tablet (25 mg total) by mouth daily  -     ticagrelor (BRILINTA) 90 MG; Take 1 tablet (90 mg total) by mouth every 12 (twelve) hours  -     Lipid Panel with Direct LDL reflex; Future  -     Hepatic function panel    2. History of ST elevation myocardial infarction (STEMI)  Assessment & Plan:  See discussion under CAD  S/P inferior STEMI 12/9/23 with TNK with successful re-perfusion.      3. Gastroesophageal reflux disease without esophagitis  Assessment & Plan:  Symptoms well controlled on pantoprazole 20 mg daily.  Reviewed use of enteric coated aspirin.    Orders:  -     pantoprazole (PROTONIX) 20 mg tablet; Take 1 tablet (20 mg  "total) by mouth daily in the early morning    4. Type 2 diabetes mellitus with hyperglycemia, without long-term current use of insulin ([facility])  Assessment & Plan:    Lab Results   Component Value Date    HGBA1C 14.0 (H) 12/10/2023     New diagnosis.  Awaiting consult with [facility].  Now on Lantus and humolog.  I reviewed importance of monitoring blood sugars with use of short acting insulin. He will contact his PCP for sugars < 80.  Should have eye exam given vision blurring.      5. Primary hypertension    6. Class 1 obesity due to excess calories without serious comorbidity with body mass index (BMI) of 30.0 to 30.9 in adult  Assessment & Plan:  Body mass index is 30.13 kg/m².  Reviewed dietary and exercise modifications for weight control.  Will monitor.      7. Transaminitis  Assessment & Plan:  Mild LFT elevation at hospital admission. Will repeat labs.           RENEE Kennedy presented to [facility] ER 12/9/23 with ongoing mid sternal chest discomfort which he describes as \"indigestion\" lasting for hours and intermittent left arm discomfort. He had experienced similar symptoms 3 times prior in the past week while on a hunting trip but the symptoms resolved within 15 minutes. He went to the hospital where ECG showed inferior STEMI and HS trop was 1300. He was flown to [facility], however, transport was delayed and he received TNK which completely resolved his symptoms. Cardiac catheterization revealed mild, non-obstructive triple vessel disease. Echocardiogram showed EF 55% with hypokinesis of the posterobasal segment, no significant valvular abnormality. He was started on DAPT with ASA and Brilinta, atorvastatin 80 mg daily, and metoprolol succinate 25 mg daily. He was found to be diabetic as well with Ha1c of 14. He was started on insulin and given a referral to endocrinology. Prior to this admission he had not been to primary care in over a year. He was unaware of any medical issues leading up to this point. He " does have a family history of diabetes. Father has valvular heart disease.     2023: Brent presents for close follow up. He is accompanied by his spouse. He states that he is doing very well since his hospitalization. He denies any recurrent anginal complaints. He has returned to work. We reviewed his hospitalization in detail. No chest pain. No recurrent indigestion. He has noticed some shortness of breath periodically. No edema/fluid retention. No lightheadedness or dizziness. No unusual bleeding. He is taking and tolerating his medications. He has been taking the insulin but has not had the glucometer. He got notice from the pharmacy today that it is finally ready so he will be picking up today. He meets with endocrinology and his PCP in the first of the year. He has noticed some blurring of his vision since being on the insulin. No diaphoresis or weakness.     Medical Problems       Problem List       STEMI (ST elevation myocardial infarction) ([facility])    HTN (hypertension)    Obesity    Type 2 diabetes mellitus (HCC)    Transaminitis        Past Medical History:   Diagnosis Date    CAD (coronary artery disease)     Diabetes mellitus (HCC)     Hypertension     Obesity (BMI 30-39.9)      Social History     Socioeconomic History    Marital status: /Civil Union     Spouse name: Not on file    Number of children: Not on file    Years of education: Not on file    Highest education level: Not on file   Occupational History    Not on file   Tobacco Use    Smoking status: Former     Current packs/day: 0.00     Types: Cigarettes     Quit date: 2010     Years since quittin.0    Smokeless tobacco: Never   Substance and Sexual Activity    Alcohol use: Not Currently     Comment: very rare    Drug use: Never    Sexual activity: Not on file     Comment: defer   Other Topics Concern    Not on file   Social History Narrative    Not on file     Social Determinants of Health     Financial Resource Strain: Not  on file   Food Insecurity: Not on file   Transportation Needs: Not on file   Physical Activity: Not on file   Stress: Not on file   Social Connections: Not on file   Intimate Partner Violence: Not on file   Housing Stability: Not on file      Family History   Problem Relation Age of Onset    Diabetes Mother     Diabetes Father     Valvular heart disease Father     Atrial fibrillation Father     Diabetes Sister     Lung disease Sister     Heart failure Sister      Past Surgical History:   Procedure Laterality Date    CARDIAC CATHETERIZATION Left 12/11/2023    Procedure: Cardiac Left Heart Cath;  Surgeon: Nacho Salinas DO;  Location: [facility]ARD[facility];  Service: Cardiology    CARDIAC CATHETERIZATION N/A 12/11/2023    Procedure: Cardiac Coronary Angiogram;  Surgeon: Nacho Salinas DO;  Location: [facility];  Service: Cardiology    CARDIAC CATHETERIZATION  12/11/2023    Procedure: Cardiac catheterization;  Surgeon: Nacho Salinas DO;  Location: [facility]ARD[facility];  Service: Cardiology    ULNAR NERVE REPAIR Left 2018    WRIST ARTHRODESIS Bilateral     2019, 2014       Current Outpatient Medications:     aspirin (ECOTRIN LOW STRENGTH) 81 mg EC tablet, Take 1 tablet (81 mg total) by mouth daily, Disp: 90 tablet, Rfl: 3    atorvastatin (LIPITOR) 80 mg tablet, Take 1 tablet (80 mg total) by mouth daily with dinner, Disp: 90 tablet, Rfl: 3    Blood Glucose Monitoring Suppl (OneTouch Verio Reflect) w/Device KIT, May substitute brand based on insurance coverage. Check glucose ACHS., Disp: 1 kit, Rfl: 0    glucose blood (OneTouch Verio) test strip, May substitute brand based on insurance coverage. Check glucose ACHS., Disp: 200 each, Rfl: 0    Insulin Glargine Solostar (Lantus SoloStar) 100 UNIT/ML SOPN, Inject 0.3 mL (30 Units total) under the skin daily at bedtime, Disp: 9 mL, Rfl: 0    insulin lispro (HumaLOG KwikPen) 100 units/mL injection pen, Inject 10 Units under the skin 3 (three) times  a day with meals, Disp: 9 mL, Rfl: 0    Insulin Pen Needle (BD Pen Needle Andreia 2nd Gen) 32G X 4 MM MISC, For use with insulin pen. Pharmacy may dispense brand covered by insurance., Disp: 100 each, Rfl: 0    Insulin Pen Needle (BD Pen Needle Andreia 2nd Gen) 32G X 4 MM MISC, For use with insulin pen. Pharmacy may dispense brand covered by insurance.   Use 4 times a day before meals and bed time, Disp: 100 each, Rfl: 0    metoprolol succinate (TOPROL-XL) 25 mg 24 hr tablet, Take 1 tablet (25 mg total) by mouth daily, Disp: 90 tablet, Rfl: 3    nitroglycerin (NITROSTAT) 0.4 mg SL tablet, Place 1 tablet (0.4 mg total) under the tongue every 5 (five) minutes as needed for chest pain, Disp: 30 tablet, Rfl: 0    OneTouch Delica Lancets 33G MISC, May substitute brand based on insurance coverage. Check glucose ACHS., Disp: 200 each, Rfl: 0    pantoprazole (PROTONIX) 20 mg tablet, Take 1 tablet (20 mg total) by mouth daily in the early morning, Disp: 90 tablet, Rfl: 3    ticagrelor (BRILINTA) 90 MG, Take 1 tablet (90 mg total) by mouth every 12 (twelve) hours, Disp: 180 tablet, Rfl: 3    Alcohol Swabs 70 % PADS, May substitute brand based on insurance coverage. Check glucose ACHS., Disp: 200 each, Rfl: 0  No Known Allergies    Labs:     Chemistry        Component Value Date/Time    K 3.8 12/12/2023 0445     12/12/2023 0445    CO2 22 12/12/2023 0445    BUN 18 12/12/2023 0445    CREATININE 0.97 12/12/2023 0445        Component Value Date/Time    CALCIUM 8.4 12/12/2023 0445    ALKPHOS 57 12/11/2023 0439    AST 90 (H) 12/11/2023 0439    ALT 49 12/11/2023 0439        Lipid panel 12/10/2023: C 209. T 306. H 35. L 113.    Imaging:   ECG 12/21/2023: Normal sinus rhythm. LAD. Inferior infarct. Anterolateral infarct. Rate 68 bpm.    Cardiac catheterization 12/11/2023  s/p thrombolytic therapy for inferior stemi with successful clinical reperfusion. Cath today with no significant obstructive disease requiring PCI. Minimal LM. 30%  "mid LAD. 50% mid Lcx. 40% mid RCA.    Echo complete 12/10/2023  EF 55%. Hypokinesis of the posterobasal segment of the LV. Mild MR. Trace TR.    ECG 12/9/2023: Normal sinus rhythm with sinus arrhthmia. Inferior-posterior infact with acute inferior infarct.    Review of Systems   Constitutional: Negative.   HENT: Negative.     Cardiovascular:  Positive for dyspnea on exertion. Negative for chest pain, irregular heartbeat, leg swelling, near-syncope, orthopnea and palpitations.   Respiratory:  Negative for cough and snoring.    Endocrine: Negative.    Skin: Negative.    Musculoskeletal: Negative.    Gastrointestinal: Negative.    Genitourinary: Negative.    Neurological: Negative.    Psychiatric/Behavioral: Negative.         Vitals:    12/21/23 1423   BP: 124/86   Pulse:    Temp:    SpO2:      Vitals:    12/21/23 1323   Weight: 95.3 kg (210 lb)     Height: 5' 10\" (177.8 cm)   Body mass index is 30.13 kg/m².    Physical Exam  Vitals and nursing note reviewed.   Constitutional:       General: He is not in acute distress.     Appearance: He is well-developed. He is not diaphoretic.   HENT:      Head: Normocephalic and atraumatic.   Neck:      Vascular: No carotid bruit or JVD.   Cardiovascular:      Rate and Rhythm: Normal rate and regular rhythm. No extrasystoles are present.     Pulses: Intact distal pulses.      Heart sounds: Normal heart sounds, S1 normal and S2 normal. No murmur heard.     No friction rub. No gallop.      Comments: No edema  Pulmonary:      Effort: Pulmonary effort is normal. No respiratory distress.      Breath sounds: Normal breath sounds.   Abdominal:      General: There is no distension.      Palpations: Abdomen is soft.      Tenderness: There is no abdominal tenderness.   Skin:     General: Skin is warm and dry.      Findings: No rash.   Neurological:      Mental Status: He is alert and oriented to person, place, and time.   Psychiatric:         Behavior: Behavior normal.                "

## 2023-12-21 NOTE — ASSESSMENT & PLAN NOTE
Body mass index is 30.13 kg/m².  Reviewed dietary and exercise modifications for weight control.  Will monitor.

## 2024-01-03 ENCOUNTER — OFFICE VISIT (OUTPATIENT)
Dept: ENDOCRINOLOGY | Facility: CLINIC | Age: 59
End: 2024-01-03
Payer: COMMERCIAL

## 2024-01-03 VITALS
SYSTOLIC BLOOD PRESSURE: 118 MMHG | HEART RATE: 64 BPM | BODY MASS INDEX: 30.21 KG/M2 | WEIGHT: 211 LBS | OXYGEN SATURATION: 97 % | HEIGHT: 70 IN | DIASTOLIC BLOOD PRESSURE: 80 MMHG

## 2024-01-03 DIAGNOSIS — E78.2 MIXED HYPERLIPIDEMIA: ICD-10-CM

## 2024-01-03 DIAGNOSIS — E11.69 TYPE 2 DIABETES MELLITUS WITH OTHER SPECIFIED COMPLICATION, WITHOUT LONG-TERM CURRENT USE OF INSULIN (HCC): Primary | ICD-10-CM

## 2024-01-03 DIAGNOSIS — E11.69 TYPE 2 DIABETES MELLITUS WITH OTHER SPECIFIED COMPLICATION, WITHOUT LONG-TERM CURRENT USE OF INSULIN (HCC): ICD-10-CM

## 2024-01-03 PROCEDURE — 99214 OFFICE O/P EST MOD 30 MIN: CPT | Performed by: STUDENT IN AN ORGANIZED HEALTH CARE EDUCATION/TRAINING PROGRAM

## 2024-01-03 RX ORDER — INSULIN GLARGINE 100 [IU]/ML
34 INJECTION, SOLUTION SUBCUTANEOUS
Qty: 10.2 ML | Refills: 0 | Status: SHIPPED | OUTPATIENT
Start: 2024-01-03 | End: 2024-01-03 | Stop reason: SDUPTHER

## 2024-01-03 RX ORDER — INSULIN LISPRO 100 [IU]/ML
10 INJECTION, SOLUTION INTRAVENOUS; SUBCUTANEOUS
Qty: 15 ML | Refills: 1 | Status: SHIPPED | OUTPATIENT
Start: 2024-01-03 | End: 2024-01-04

## 2024-01-03 RX ORDER — PEN NEEDLE, DIABETIC 32GX 5/32"
NEEDLE, DISPOSABLE MISCELLANEOUS
Qty: 100 EACH | Refills: 0 | Status: SHIPPED | OUTPATIENT
Start: 2024-01-03

## 2024-01-03 RX ORDER — PEN NEEDLE, DIABETIC 32GX 5/32"
NEEDLE, DISPOSABLE MISCELLANEOUS
Qty: 400 EACH | Refills: 1 | Status: SHIPPED | OUTPATIENT
Start: 2024-01-03

## 2024-01-03 RX ORDER — INSULIN GLARGINE 100 [IU]/ML
34 INJECTION, SOLUTION SUBCUTANEOUS
Qty: 15 ML | Refills: 1 | Status: SHIPPED | OUTPATIENT
Start: 2024-01-03 | End: 2024-02-02

## 2024-01-03 RX ORDER — BLOOD SUGAR DIAGNOSTIC
STRIP MISCELLANEOUS
Qty: 200 EACH | Refills: 1 | Status: SHIPPED | OUTPATIENT
Start: 2024-01-03

## 2024-01-03 NOTE — PROGRESS NOTES
Brent Hall 58 y.o. male MRN: [id number]    Encounter: [id number]      Assessment/Plan     T2D c/b STEMI - We discussed the pathophysiology of diabetes and its associations with negative health outcomes.  I counseled the patient on various goals of diabetes management, including healthy lifestyle and behaviors, glycemic targets, preventative health care, and the role of pharmacotherapies. Today, recommend increase lantus 34 units nightly due to fasting hyperglycemia. Continue humalog 10 units, take 10-15 min before meals, plus introduction of scale ISF 50 >150. I am recommending Brent see CDE for DSME and MNT education. He is agreeable and a referral has been placed. I provided SMBG logs for Brent to check AC/HS BG. He prefers capillary testing over continuous monitor for now. A formal diabetic eye examination was also recommended. I have asked he RTC in 4-6 weeks to review control and discuss optimization of treatment plan. Call sooner with concerns.   Hyperlipidemia - uncontrolled. Recently initiated on statin therapy. Monitoring labs will be requested in future for treatment response    Problem List Items Addressed This Visit     Type 2 diabetes mellitus (HCC) - Primary    Relevant Medications    insulin lispro (HumaLOG KwikPen) 100 units/mL injection pen    Insulin Glargine Solostar (Lantus SoloStar) 100 UNIT/ML SOPN    Insulin Pen Needle (BD Pen Needle Andreia 2nd Gen) 32G X 4 MM MISC    Insulin Pen Needle (BD Pen Needle Andreia 2nd Gen) 32G X 4 MM MISC    glucose blood (OneTouch Verio) test strip    Other Relevant Orders    Ambulatory referral to Diabetic Education   Other Visit Diagnoses     Mixed hyperlipidemia            RTC 4-6 weeks    I have spent a total time of 35 minutes on 01/03/24 in caring for this patient including Diagnostic results, Prognosis, Instructions for management, Patient and family education, Risk factor reductions, Impressions, Counseling / Coordination of care, Documenting in the medical  record, Reviewing / ordering tests, medicine, procedures  , and Obtaining or reviewing history  .      CC: Diabetes    History of Present Illness     HPI:    Brent presents for initial outpatient evaluation of diabetes.     Brent was seen by my colleagues during an inpatient stay at [facility] for STEMI with newly diagnosed diabetes. Presenting A1c 14%. Patient required IV then SC insulin during hospitalization. Patient received thrombolytic therapy with successful reperfusion. Coronary cath did not reveal significant stenosis.     Cardiovascular risk factors: advanced age (older than 55 for men, 65 for women), diabetes mellitus, dyslipidemia, hypertension, male gender, and obesity (BMI >= 30 kg/m2)  Current diabetic medications include lantus 30 units qHS, humalog 10 units after meals.   Eye exam current (within one year): no  Prior visit with dietician: no  Current diet: not asked. Mentioned baked potato last night, bg 300  Current exercise: none    Current monitoring regimen: home blood tests -  several times daily (at work, after dinner, and before bedtime)  Home blood sugar records: unavailable. Per recall, range 115 - 285. Fasting values typically >180  Any episodes of hypoglycemia? No    Reports no present hyperglycemic symptoms. Has experienced changes in vision, although improving. No prior eye exam.     Is He on ACE inhibitor or angiotensin II receptor blocker?   No     For hyperlipidemia he takes high intensity statin.      Review of Systems   Constitutional:  Negative for diaphoresis and unexpected weight change.   Gastrointestinal:  Negative for nausea and vomiting.   Endocrine: Negative for polydipsia and polyuria.   All other systems reviewed and are negative.      Historical Information   Past Medical History:   Diagnosis Date   • CAD (coronary artery disease)    • Diabetes mellitus (HCC)    • Hypertension    • Obesity (BMI 30-39.9)      Past Surgical History:   Procedure Laterality Date   • CARDIAC  CATHETERIZATION Left 2023    Procedure: Cardiac Left Heart Cath;  Surgeon: Nacho Salinas DO;  [facility];  Service: Cardiology   • CARDIAC CATHETERIZATION N/A 2023    Procedure: Cardiac Coronary Angiogram;  Surgeon: Nacho Salinas DO;  [facility];  Service: Cardiology   • CARDIAC CATHETERIZATION  2023    Procedure: Cardiac catheterization;  Surgeon: Nacho Salinas DO;  [facility];  Service: Cardiology   • ULNAR NERVE REPAIR Left    • WRIST ARTHRODESIS Bilateral     2014     Social History   Social History     Substance and Sexual Activity   Alcohol Use Not Currently    Comment: very rare     Social History     Substance and Sexual Activity   Drug Use Never     Social History     Tobacco Use   Smoking Status Former   • Current packs/day: 0.00   • Types: Cigarettes   • Quit date: 2010   • Years since quittin.0   Smokeless Tobacco Never     Family History:   Family History   Problem Relation Age of Onset   • Diabetes Mother    • Diabetes Father    • Valvular heart disease Father    • Atrial fibrillation Father    • Diabetes Sister    • Lung disease Sister    • Heart failure Sister        Meds/Allergies   Current Outpatient Medications   Medication Sig Dispense Refill   • Alcohol Swabs 70 % PADS May substitute brand based on insurance coverage. Check glucose ACHS. 200 each 0   • aspirin (ECOTRIN LOW STRENGTH) 81 mg EC tablet Take 1 tablet (81 mg total) by mouth daily 90 tablet 3   • atorvastatin (LIPITOR) 80 mg tablet Take 1 tablet (80 mg total) by mouth daily with dinner 90 tablet 3   • Blood Glucose Monitoring Suppl (OneTouch Verio Reflect) w/Device KIT May substitute brand based on insurance coverage. Check glucose ACHS. 1 kit 0   • glucose blood (OneTouch Verio) test strip Check blood sugars 4 times daily 200 each 1   • Insulin Glargine Solostar (Lantus SoloStar) 100 UNIT/ML SOPN Inject 0.34 mL (34  "Units total) under the skin daily at bedtime E11.65 Dispense for TDD 50 units 15 mL 1   • insulin lispro (HumaLOG KwikPen) 100 units/mL injection pen Inject 10 Units under the skin 3 (three) times a day with meals E11.65 Inject 10 units before meals plus scale. TDD 50 units 15 mL 1   • Insulin Pen Needle (BD Pen Needle Andreia 2nd Gen) 32G X 4 MM MISC 4 times daily insulin 400 each 1   • Insulin Pen Needle (BD Pen Needle Andreia 2nd Gen) 32G X 4 MM MISC For use with insulin pen. Pharmacy may dispense brand covered by insurance. 100 each 0   • metoprolol succinate (TOPROL-XL) 25 mg 24 hr tablet Take 1 tablet (25 mg total) by mouth daily 90 tablet 3   • nitroglycerin (NITROSTAT) 0.4 mg SL tablet Place 1 tablet (0.4 mg total) under the tongue every 5 (five) minutes as needed for chest pain 30 tablet 0   • OneTouch Delica Lancets 33G MISC May substitute brand based on insurance coverage. Check glucose ACHS. 200 each 0   • pantoprazole (PROTONIX) 20 mg tablet Take 1 tablet (20 mg total) by mouth daily in the early morning 90 tablet 3   • ticagrelor (BRILINTA) 90 MG Take 1 tablet (90 mg total) by mouth every 12 (twelve) hours 180 tablet 3     No current facility-administered medications for this visit.     No Known Allergies    Objective   Vitals: Blood pressure 118/80, pulse 64, height 5' 10\" (1.778 m), weight 95.7 kg (211 lb), SpO2 97%.    Physical Exam  Vitals reviewed.   Constitutional:       Appearance: Normal appearance.   HENT:      Head: Normocephalic and atraumatic.      Nose: Nose normal.   Eyes:      General: No scleral icterus.     Conjunctiva/sclera: Conjunctivae normal.   Pulmonary:      Effort: Pulmonary effort is normal. No respiratory distress.   Abdominal:      Palpations: Abdomen is soft.      Tenderness: There is no abdominal tenderness.   Musculoskeletal:      Cervical back: Normal range of motion.   Skin:     General: Skin is warm and dry.   Neurological:      General: No focal deficit present.      Mental " Status: He is alert.   Psychiatric:         Mood and Affect: Mood normal.         Behavior: Behavior normal.         The history was obtained from the review of the chart, patient.    Lab Results:   Lab Results   Component Value Date/Time    Hemoglobin A1C 14.0 (H) 12/10/2023 06:32 AM    WBC 5.70 12/12/2023 04:45 AM    WBC 7.38 12/11/2023 04:39 AM    WBC 8.32 12/10/2023 03:36 AM    Hemoglobin 13.3 12/12/2023 04:45 AM    Hemoglobin 13.0 12/11/2023 04:39 AM    Hemoglobin 13.5 12/10/2023 03:36 AM    Hematocrit 40.1 12/12/2023 04:45 AM    Hematocrit 38.9 12/11/2023 04:39 AM    Hematocrit 39.1 12/10/2023 03:36 AM    MCV 89 12/12/2023 04:45 AM    MCV 88 12/11/2023 04:39 AM    MCV 86 12/10/2023 03:36 AM    Platelets 156 12/12/2023 04:45 AM    Platelets 154 12/11/2023 04:39 AM    Platelets 189 12/10/2023 03:36 AM    BUN 18 12/12/2023 04:45 AM    BUN 20 12/11/2023 04:39 AM    BUN 16 12/10/2023 02:23 PM    Potassium 3.8 12/12/2023 04:45 AM    Potassium 3.5 12/11/2023 04:39 AM    Potassium 3.2 (L) 12/10/2023 02:23 PM    Chloride 104 12/12/2023 04:45 AM    Chloride 110 (H) 12/11/2023 04:39 AM    Chloride 104 12/10/2023 02:23 PM    CO2 22 12/12/2023 04:45 AM    CO2 27 12/11/2023 04:39 AM    CO2 29 12/10/2023 02:23 PM    Creatinine 0.97 12/12/2023 04:45 AM    Creatinine 1.07 12/11/2023 04:39 AM    Creatinine 0.97 12/10/2023 02:23 PM    AST 90 (H) 12/11/2023 04:39 AM     (H) 12/10/2023 12:00 AM    AST 33 12/09/2023 09:02 PM    ALT 49 12/11/2023 04:39 AM    ALT 62 (H) 12/10/2023 12:00 AM    ALT 46 12/09/2023 09:02 PM    Total Protein 5.4 (L) 12/11/2023 04:39 AM    Total Protein 6.0 (L) 12/10/2023 12:00 AM    Total Protein 7.5 12/09/2023 09:02 PM    Albumin 3.4 (L) 12/11/2023 04:39 AM    Albumin 3.8 12/10/2023 12:00 AM    Albumin 4.7 12/09/2023 09:02 PM    HDL, Direct 35 (L) 12/10/2023 12:00 AM    Triglycerides 306 (H) 12/10/2023 12:00 AM           Imaging Studies: I have personally reviewed pertinent reports.      Portions  "of the record may have been created with voice recognition software. Occasional wrong word or \"sound a like\" substitutions may have occurred due to the inherent limitations of voice recognition software. Read the chart carefully and recognize, using context, where substitutions have occurred.    "

## 2024-01-03 NOTE — PATIENT INSTRUCTIONS
Increase Lantus 34 units nightly    Continue humalog 10 units before meal (10-15 minutes ahead of meal) plus scale as indicated below:    Blood Glucose               Additional Insulin Units                             150-200                               1  201-250                               2  251-300                               3  301-350                               4  351-400                               5  >400                                    6

## 2024-01-04 RX ORDER — INSULIN ASPART INJECTION 100 [IU]/ML
INJECTION, SOLUTION SUBCUTANEOUS
Qty: 15 ML | Refills: 1 | Status: SHIPPED | OUTPATIENT
Start: 2024-01-04

## 2024-02-16 ENCOUNTER — OFFICE VISIT (OUTPATIENT)
Dept: ENDOCRINOLOGY | Facility: CLINIC | Age: 59
End: 2024-02-16
Payer: COMMERCIAL

## 2024-02-16 VITALS
HEIGHT: 70 IN | HEART RATE: 86 BPM | BODY MASS INDEX: 31.35 KG/M2 | OXYGEN SATURATION: 96 % | SYSTOLIC BLOOD PRESSURE: 112 MMHG | WEIGHT: 219 LBS | DIASTOLIC BLOOD PRESSURE: 78 MMHG

## 2024-02-16 DIAGNOSIS — E78.49 OTHER HYPERLIPIDEMIA: ICD-10-CM

## 2024-02-16 DIAGNOSIS — Z79.4 TYPE 2 DIABETES MELLITUS WITHOUT COMPLICATION, WITH LONG-TERM CURRENT USE OF INSULIN (HCC): ICD-10-CM

## 2024-02-16 DIAGNOSIS — I10 HYPERTENSION, UNSPECIFIED TYPE: ICD-10-CM

## 2024-02-16 DIAGNOSIS — E11.9 TYPE 2 DIABETES MELLITUS WITHOUT COMPLICATION, WITH LONG-TERM CURRENT USE OF INSULIN (HCC): ICD-10-CM

## 2024-02-16 DIAGNOSIS — E11.69 TYPE 2 DIABETES MELLITUS WITH OTHER SPECIFIED COMPLICATION, WITHOUT LONG-TERM CURRENT USE OF INSULIN (HCC): Primary | ICD-10-CM

## 2024-02-16 PROCEDURE — 99214 OFFICE O/P EST MOD 30 MIN: CPT | Performed by: PHYSICIAN ASSISTANT

## 2024-02-16 NOTE — PROGRESS NOTES
Brent Hall 58 y.o. male MRN: [id number]    Encounter: [id number]      Assessment/Plan   Problem List Items Addressed This Visit          Endocrine    Type 2 diabetes mellitus without complication, with long-term current use of insulin (HCC) - Primary       Lab Results   Component Value Date    HGBA1C 14.0 (H) 12/10/2023   Likely improving greatly.   Reviewed blood glucose targets today of , A1c goal of less than 7.   Pharmacotherapy discussion -Brent is interested in eliminating insulin as soon as possible.  We discussed cardiorenal protective benefits, MOA of GLP-1 RA medication class. He does not have any contraindications for GLP-1 use.  Will start Ozempic 0.25 mg weekly x 4 weeks with plans to uptitrate to 0.5 mg weekly thereafter if well-tolerated.  In the meantime recommend down titration and insulin dosing -reduce Lantus from 30 to 27 units nightly, reduce BS from 10 to 7 units twice daily AC.  BG monitoring - would benefit from CGM use in future due to MDI insulin use, but for now is comfortable with SMBG monitoring via fingerstick BID AC and HS. Encouraged to submit BG log in 2 weeks for our review.  Labs -recommend follow-up A1c, BMP, urine microalbumin prior to next visit.  Follow up -due to high risk status we will follow-up again in 2 months.         Relevant Medications    semaglutide, 0.25 or 0.5 mg/dose, (Ozempic, 0.25 or 0.5 MG/DOSE,) 2 mg/3 mL injection pen       Cardiovascular and Mediastinum    HTN (hypertension)     Currently well-controlled on metoprolol, with low normal systolic blood pressure in the 110s.            Other    Other hyperlipidemia     Managed by cardiology.    Most recent lipid panel demonstrated triglycerides greater than 300, .  Recently started on high intensity statin.             CC: Diabetes    History of Present Illness     HPI:  Brent is a pleasant 58-year-old male with a history of type 2 diabetes here for routine follow-up visit.  Diabetes diagnosis is  fairly new, diagnosed during recent hospitalization in December 2023.  Diabetes is complicated in the setting of coronary artery disease, recent STEMI.    Last visit with endocrinology was 1/3/2024.    BG monitoring:   Checking 3x daily BS fingerstick, presented glucometer today for review.  Typical BG trends as follows:  150s - 170s fasting.   He does capture intermittent mild hypoglycemia, approximately 1-2 times weekly.   BG high recently - up to 280 after meal, but this infrequent to capture BG > 200.    He has noticed improvement in polyuria, polydipsia.    DM medication review:  Lantus 30 units in evening.  Fiasp 10 untis BID AC at fixed dosing.  Very compliant with insulin dosing, no missed skipped or forgotten doses.    Eye exam - none recently.   DM foot exam - updated today    He denies a history of pancreatitis, gastroparesis.  No family or personal history of medullary thyroid cancer or MEN 2 syndrome.     For hyperlipidemia he takes high intensity statin.  He has a history of hypertension but not taking ACE inhibitor or ARB,.  Metoprolol was added recently in setting of STEMI.           Review of Systems   Constitutional:  Negative for chills and fever.   HENT:  Negative for ear pain and sore throat.    Eyes:  Negative for pain and visual disturbance.   Respiratory:  Negative for cough and shortness of breath.    Cardiovascular:  Negative for chest pain and palpitations.   Gastrointestinal:  Negative for abdominal pain and vomiting.   Endocrine: Negative for polydipsia and polyuria.   Genitourinary:  Negative for dysuria and hematuria.   Musculoskeletal:  Negative for arthralgias and back pain.   Skin:  Negative for color change and rash.   Neurological:  Negative for seizures and syncope.   All other systems reviewed and are negative.      Historical Information   Past Medical History:   Diagnosis Date    CAD (coronary artery disease)     Diabetes mellitus (HCC)     Hypertension     Obesity (BMI  30-39.9)      Past Surgical History:   Procedure Laterality Date    CARDIAC CATHETERIZATION Left 2023    Procedure: Cardiac Left Heart Cath;  Surgeon: Nacho Salinas DO;  [facility]: [facility];  Service: Cardiology    CARDIAC CATHETERIZATION N/A 2023    Procedure: Cardiac Coronary Angiogram;  Surgeon: Nacho Salinas DO;  [facility];  Service: Cardiology    CARDIAC CATHETERIZATION  2023    Procedure: Cardiac catheterization;  Surgeon: Nacho Salinas DO;  Location: [facility];  Service: Cardiology    ULNAR NERVE REPAIR Left 2018    WRIST ARTHRODESIS Bilateral     2014     Social History   Social History     Substance and Sexual Activity   Alcohol Use Not Currently    Comment: very rare     Social History     Substance and Sexual Activity   Drug Use Never     Social History     Tobacco Use   Smoking Status Former    Current packs/day: 0.00    Types: Cigarettes    Quit date: 2010    Years since quittin.1   Smokeless Tobacco Never     Family History:   Family History   Problem Relation Age of Onset    Diabetes Mother     Diabetes Father     Valvular heart disease Father     Atrial fibrillation Father     Diabetes Sister     Lung disease Sister     Heart failure Sister        Meds/Allergies   Current Outpatient Medications   Medication Sig Dispense Refill    Alcohol Swabs 70 % PADS May substitute brand based on insurance coverage. Check glucose ACHS. 200 each 0    aspirin (ECOTRIN LOW STRENGTH) 81 mg EC tablet Take 1 tablet (81 mg total) by mouth daily 90 tablet 3    atorvastatin (LIPITOR) 80 mg tablet Take 1 tablet (80 mg total) by mouth daily with dinner 90 tablet 3    Blood Glucose Monitoring Suppl (OneTouch Verio Reflect) w/Device KIT May substitute brand based on insurance coverage. Check glucose ACHS. 1 kit 0    Fiasp FlexTouch 100 units/mL injection pen Inject 10 units with meals plus scale. TDD 50 units 15 mL 1    glucose  "blood (OneTouch Verio) test strip Check blood sugars 4 times daily 200 each 1    Insulin Pen Needle (BD Pen Needle Andreia 2nd Gen) 32G X 4 MM MISC 4 times daily insulin 400 each 1    Insulin Pen Needle (BD Pen Needle Andreia 2nd Gen) 32G X 4 MM MISC For use with insulin pen. Pharmacy may dispense brand covered by insurance. 100 each 0    metoprolol succinate (TOPROL-XL) 25 mg 24 hr tablet Take 1 tablet (25 mg total) by mouth daily 90 tablet 3    nitroglycerin (NITROSTAT) 0.4 mg SL tablet Place 1 tablet (0.4 mg total) under the tongue every 5 (five) minutes as needed for chest pain 30 tablet 0    OneTouch Delica Lancets 33G MISC May substitute brand based on insurance coverage. Check glucose ACHS. 200 each 0    ticagrelor (BRILINTA) 90 MG Take 1 tablet (90 mg total) by mouth every 12 (twelve) hours 180 tablet 3    pantoprazole (PROTONIX) 20 mg tablet Take 1 tablet (20 mg total) by mouth daily in the early morning (Patient not taking: Reported on 2/16/2024) 90 tablet 3     No current facility-administered medications for this visit.     No Known Allergies    Objective   Vitals: Blood pressure 112/78, pulse 86, height 5' 10\" (1.778 m), weight 99.3 kg (219 lb), SpO2 96%.    Physical Exam  Vitals reviewed.   HENT:      Head: Normocephalic.   Cardiovascular:      Rate and Rhythm: Normal rate and regular rhythm.      Pulses: no weak pulses.           Dorsalis pedis pulses are 2+ on the right side and 2+ on the left side.        Posterior tibial pulses are 2+ on the right side and 2+ on the left side.      Heart sounds: Normal heart sounds.   Pulmonary:      Effort: Pulmonary effort is normal. No respiratory distress.      Breath sounds: Normal breath sounds.   Musculoskeletal:      Right lower leg: No edema.      Left lower leg: No edema.   Feet:      Right foot:      Skin integrity: Warmth present. No ulcer or callus.      Left foot:      Skin integrity: Warmth present. No ulcer or callus.   Skin:     General: Skin is warm and " "dry.   Neurological:      Mental Status: He is alert.   Psychiatric:         Mood and Affect: Mood normal.     Diabetic Foot Exam    Patient's shoes and socks removed.    Right Foot/Ankle   Right Foot Inspection  Skin Exam: skin intact and warmth. No callus, no ulcer and no callus.     Toe Exam:  no right toe deformity    Sensory   Vibration: intact  Monofilament testing: intact    Vascular  The right DP pulse is 2+. The right PT pulse is 2+.     Left Foot/Ankle  Left Foot Inspection  Skin Exam: skin intact and warmth. No ulcer and no callus.     Toe Exam: No left toe deformity.     Sensory   Vibration: intact  Monofilament testing: intact    Vascular  The left DP pulse is 2+. The left PT pulse is 2+.     Assign Risk Category  No deformity present  No loss of protective sensation  No weak pulses  Risk: 0      The history was obtained from the review of the chart, patient.    Lab Results:   Component      Latest Ref Rng 12/10/2023 12/12/2023   Sodium      135 - 147 mmol/L  135    Potassium      3.5 - 5.3 mmol/L  3.8    Chloride      96 - 108 mmol/L  104    Carbon Dioxide      21 - 32 mmol/L  22    ANION GAP      mmol/L  9    BUN      5 - 25 mg/dL  18    Creatinine      0.60 - 1.30 mg/dL  0.97    GLUCOSE      65 - 140 mg/dL  183 (H)    Calcium      8.4 - 10.2 mg/dL  8.4    GFR, Calculated      ml/min/1.73sq m  85    Cholesterol      See Comment mg/dL 209 (H)     Triglycerides      See Comment mg/dL 306 (H)     HDL      >=40 mg/dL 35 (L)     LDL Calculated      0 - 100 mg/dL 113 (H)     Hemoglobin A1C      Normal 4.0-5.6%; PreDiabetic 5.7-6.4%; Diabetic >=6.5%; Glycemic control for adults with diabetes <7.0% % 14.0 (H)     eAG, EST AVG Glucose      mg/dl 355        Legend:  (H) High  (L) Low      Imaging Studies: n/a    Portions of the record may have been created with voice recognition software. Occasional wrong word or \"sound a like\" substitutions may have occurred due to the inherent limitations of voice recognition " software. Read the chart carefully and recognize, using context, where substitutions have occurred.

## 2024-02-20 PROBLEM — Z79.4 TYPE 2 DIABETES MELLITUS WITHOUT COMPLICATION, WITH LONG-TERM CURRENT USE OF INSULIN (HCC): Status: ACTIVE | Noted: 2023-12-09

## 2024-02-20 PROBLEM — E78.49 OTHER HYPERLIPIDEMIA: Status: ACTIVE | Noted: 2024-02-20

## 2024-02-21 NOTE — ASSESSMENT & PLAN NOTE
Lab Results   Component Value Date    HGBA1C 14.0 (H) 12/10/2023   Likely improving greatly.   Reviewed blood glucose targets today of , A1c goal of less than 7%, or more ideally < 6.5%.  Mom  Pharmacotherapy discussion -Brent is interested in eliminating insulin as soon as possible.  We discussed cardiorenal protective benefits, MOA of GLP-1 RA medication class. He does not have any contraindications for GLP-1 use.  Will start Ozempic 0.25 mg weekly x 4 weeks with plans to uptitrate to 0.5 mg weekly thereafter if well-tolerated.  In the meantime recommend down titration of  insulin dosing -reduce Lantus from 30 to 27 units nightly, reduce BS from 10 to 7 units twice daily AC.  BG monitoring - would benefit from CGM use in future due to MDI insulin use, but for now is comfortable with SMBG monitoring via fingerstick BID AC and HS. Encouraged to submit BG log in 2 weeks for our review.  Labs -recommend follow-up A1c, BMP, urine microalbumin prior to next visit.  Follow up -due to high risk status we will follow-up again in 2 months.

## 2024-02-21 NOTE — ASSESSMENT & PLAN NOTE
Managed by cardiology.    Most recent lipid panel demonstrated triglycerides greater than 300, .  Recently started on high intensity statin.

## 2024-03-14 DIAGNOSIS — E11.69 TYPE 2 DIABETES MELLITUS WITH OTHER SPECIFIED COMPLICATION, WITHOUT LONG-TERM CURRENT USE OF INSULIN (HCC): ICD-10-CM

## 2024-03-19 DIAGNOSIS — E11.69 TYPE 2 DIABETES MELLITUS WITH OTHER SPECIFIED COMPLICATION, WITHOUT LONG-TERM CURRENT USE OF INSULIN (HCC): ICD-10-CM

## 2024-03-19 RX ORDER — INSULIN ASPART INJECTION 100 [IU]/ML
INJECTION, SOLUTION SUBCUTANEOUS
Qty: 45 ML | Refills: 3 | Status: SHIPPED | OUTPATIENT
Start: 2024-03-19

## 2024-03-19 RX ORDER — INSULIN ASPART INJECTION 100 [IU]/ML
50 INJECTION, SOLUTION SUBCUTANEOUS
Qty: 45 ML | Refills: 5 | Status: SHIPPED | OUTPATIENT
Start: 2024-03-19 | End: 2024-03-19

## 2024-03-19 NOTE — TELEPHONE ENCOUNTER
Requested medication(s) are due for refill today: Yes  Patient has already received a courtesy refill: No  Other reason request has been forwarded to provider: needs clarification

## 2024-04-08 NOTE — PROGRESS NOTES
Cardiology Follow Up    Brent Hall  1965  [id number]  [facility] [place]  [address]  [address]  [facility] [address] [phone]    Brent presents for follow up of CAD, HTN, HLD.    1. Coronary artery disease involving native coronary artery of native heart without angina pectoris  Assessment & Plan:  A. Inferior STEMI 12/9/23.  B. Cardiac cath 12/11/2023: s/p thrombolytic therapy with successful clinical reperfusion. Residual MLI in LM, 30% LAD(m), 50% Lcx(m), 40% RCA(m).  C. Echo 12/10/23 with EF 55% and hypokinesis of the posterobasal wall.  - - - - -  No recurrent chest pain.  Presents today with pallor, tachycardia, and shortness of breath/edema. Concern for blood loss. Will obtain CBC, BMP, BNP.  Update echo.  Remains on DAPT of ASA and Brilinta - urged that he take with food.   Atorvastatin 80 mg daily for goal LDL < 55.  Metoprolol succinate 25 mg daily.  Cardiac rehab at [facility].  We reviewed urgent s/s to report.  Will monitor closely.    Orders:  -     Echo follow up/limited w/ contrast if indicated; Future; Expected date: 04/10/2024  -     POCT ECG    2. History of ST elevation myocardial infarction (STEMI)  Assessment & Plan:  See discussion under CAD  S/P inferior STEMI 12/9/23 with TNK with successful re-perfusion.  Per guidelines should remain on DAPT for 12 months uninterrupted.      3. Other hyperlipidemia  Assessment & Plan:  Goal LDL < 55.  On atorvastatin 80 mg daily.  Has order for updated lipid panel.      4. Type 2 diabetes mellitus without complication, with long-term current use of insulin (HCC)  Assessment & Plan:    Lab Results   Component Value Date    HGBA1C 14.0 (H) 12/10/2023     Following with [facility] endocrinology.  Did not start GLP1 RA due to concern for GI side effects.      5. Class 1 obesity due to excess calories without serious comorbidity with body mass index (BMI) of 30.0 to 30.9 in adult  Assessment &  "Plan:  Body mass index is 32.03 kg/m².  Reviewed dietary and exercise modifications for weight control.  Will monitor.      6. Localized edema  Comments:  pitting edema, weight gain.  Check BNP and chest xray.  Added furosemide 20 mg daily with adjustments based on today's labs.  Orders:  -     Echo follow up/limited w/ contrast if indicated; Future; Expected date: 04/10/2024  -     furosemide (LASIX) 20 mg tablet; Take 1 tablet (20 mg total) by mouth daily    7. Shortness of breath  Comments:  CBC, BNP, chest xray today.  Update echo  Orders:  -     X-ray chest 2 views; Future; Expected date: 04/10/2024  -     CBC and Platelet; Future  -     Basic metabolic panel; Future  -     B-Type Natriuretic Peptide(BNP); Future  -     Echo follow up/limited w/ contrast if indicated; Future; Expected date: 04/10/2024  -     furosemide (LASIX) 20 mg tablet; Take 1 tablet (20 mg total) by mouth daily    8. Gastroesophageal reflux disease without esophagitis  Assessment & Plan:  Stopped pantoprazole and has been taking OTC Nexium.  Notes GI upset.  Change to pantoprazole 40 mg BID.  Check CBC due to pallor on exam.  Plan for GI evaluation.  Urged him to take DAPT with food.         RENEE Kennedy has a past medical history of CAD, inferior STEMI 12/9/2023 treated with TNK, HTN, HLD, T2DM.    Brent presented to [facility] ER 12/9/23 with ongoing mid sternal chest discomfort which he describes as \"indigestion\" lasting for hours and intermittent left arm discomfort. He had experienced similar symptoms 3 times prior in the past week while on a hunting trip but the symptoms resolved within 15 minutes. He went to the hospital where ECG showed inferior STEMI and HS trop was 1300. He was flown to [facility], however, transport was delayed and he received TNK which completely resolved his symptoms. Cardiac catheterization revealed mild, non-obstructive triple vessel disease. Echocardiogram showed EF 55% with hypokinesis of the posterobasal segment, no " "significant valvular abnormality. He was started on DAPT with ASA and Brilinta, atorvastatin 80 mg daily, and metoprolol succinate 25 mg daily. He was found to be diabetic as well with Ha1c of 14. He was started on insulin and given a referral to endocrinology. Prior to this admission he had not been to primary care in over a year. He was unaware of any medical issues leading up to this point. He does have a family history of diabetes. Father has valvular heart disease.     He followed up with me 12/21/2023 at which time he was doing well since hospitalization. He denied any recurrent anginal complaints. He did note blurring of vision since starting insulin and was scheduled to meet with endocrinology. He had not been monitoring his blood sugar and was encouraged to do so.    4/10/2024: Brent presents today accompanied by his wife. He states he has felt \"terrible\" for months. He reports fatigue, shortness of breath, erectile dysfunction. In the past week he reports developing a frequent dry cough which is worse with exertion or lying down. No chest pain or overt lightheadedness. No syncope/near syncope. No palpitations. He has gained 17 pounds which he feels is due to being sedentary due to activity intolerance. He does admit to onset of leg swelling. He has been taking his medications faithfully and feels he is having side effects. He skipped a dose of metoprolol this morning to see if it would help him feel better. He reports having daily GI upset. He denies ruth bleeding or tarry stools. ECG today shows ST at 101 bpm.     Medical Problems       Problem List       Transaminitis    History of ST elevation myocardial infarction (STEMI)    Coronary artery disease involving native coronary artery of native heart without angina pectoris    Type 2 diabetes mellitus without complication, with long-term current use of insulin (HCC)    HTN (hypertension)    Gastroesophageal reflux disease without esophagitis    Obesity    " Other hyperlipidemia        Past Medical History:   Diagnosis Date    CAD (coronary artery disease)     Diabetes mellitus (HCC)     Hypertension     Obesity (BMI 30-39.9)      Social History     Socioeconomic History    Marital status: /Civil Union     Spouse name: Not on file    Number of children: Not on file    Years of education: Not on file    Highest education level: Not on file   Occupational History    Not on file   Tobacco Use    Smoking status: Former     Current packs/day: 0.00     Types: Cigarettes     Quit date: 2010     Years since quittin.3    Smokeless tobacco: Never   Vaping Use    Vaping status: Never Used   Substance and Sexual Activity    Alcohol use: Not Currently     Comment: very rare    Drug use: Never    Sexual activity: Not on file     Comment: defer   Other Topics Concern    Not on file   Social History Narrative    Not on file     Social Determinants of Health     Financial Resource Strain: Not on file   Food Insecurity: Not on file   Transportation Needs: Not on file   Physical Activity: Not on file   Stress: Not on file   Social Connections: Not on file   Intimate Partner Violence: Not on file   Housing Stability: Not on file      Family History   Problem Relation Age of Onset    Diabetes Mother     Diabetes Father     Valvular heart disease Father     Atrial fibrillation Father     Diabetes Sister     Lung disease Sister     Heart failure Sister      Past Surgical History:   Procedure Laterality Date    CARDIAC CATHETERIZATION Left 2023    Procedure: Cardiac Left Heart Cath;  Surgeon: Nacho Salinas DO;  Location: [facility]ARD[facility] C[facility];  Service: Cardiology    CARDIAC CATHETERIZATION N/A 2023    Procedure: Cardiac Coronary Angiogram;  Surgeon: Nacho Salinas DO;  Location: [facility];  Service: Cardiology    CARDIAC CATHETERIZATION  2023    Procedure: Cardiac catheterization;  Surgeon: Nacho Salinas DO;  Location: [facility]  CARDIAC CATH LAB;  Service: Cardiology    ULNAR NERVE REPAIR Left 2018    WRIST ARTHRODESIS Bilateral     2019, 2014       Current Outpatient Medications:     Alcohol Swabs 70 % PADS, May substitute brand based on insurance coverage. Check glucose ACHS., Disp: 200 each, Rfl: 0    aspirin (ECOTRIN LOW STRENGTH) 81 mg EC tablet, Take 1 tablet (81 mg total) by mouth daily, Disp: 90 tablet, Rfl: 3    atorvastatin (LIPITOR) 80 mg tablet, Take 1 tablet (80 mg total) by mouth daily with dinner, Disp: 90 tablet, Rfl: 3    Blood Glucose Monitoring Suppl (OneTouch Verio Reflect) w/Device KIT, May substitute brand based on insurance coverage. Check glucose ACHS., Disp: 1 kit, Rfl: 0    Fiasp FlexTouch 100 units/mL injection pen, E11.65 Inject 10 units with meals plus scale. TDD 50 units, Disp: 45 mL, Rfl: 3    furosemide (LASIX) 20 mg tablet, Take 1 tablet (20 mg total) by mouth daily, Disp: 30 tablet, Rfl: 11    glucose blood (OneTouch Verio) test strip, Check blood sugars 4 times daily, Disp: 200 each, Rfl: 1    Insulin Pen Needle (BD Pen Needle Andreia 2nd Gen) 32G X 4 MM MISC, 4 times daily insulin, Disp: 400 each, Rfl: 1    Insulin Pen Needle (BD Pen Needle Andreia 2nd Gen) 32G X 4 MM MISC, For use with insulin pen. Pharmacy may dispense brand covered by insurance., Disp: 100 each, Rfl: 0    Lantus SoloStar 100 units/mL SOPN, Inject 34 Units under the skin daily at bedtime, Disp: , Rfl:     metoprolol succinate (TOPROL-XL) 25 mg 24 hr tablet, Take 1 tablet (25 mg total) by mouth daily, Disp: 90 tablet, Rfl: 3    nitroglycerin (NITROSTAT) 0.4 mg SL tablet, Place 1 tablet (0.4 mg total) under the tongue every 5 (five) minutes as needed for chest pain, Disp: 30 tablet, Rfl: 0    OneTouch Delica Lancets 33G MISC, May substitute brand based on insurance coverage. Check glucose ACHS., Disp: 200 each, Rfl: 0    ticagrelor (BRILINTA) 90 MG, Take 1 tablet (90 mg total) by mouth every 12 (twelve) hours, Disp: 180 tablet, Rfl: 3     ferrous sulfate 324 (65 Fe) mg, Take 1 tablet (324 mg total) by mouth daily before breakfast, Disp: 30 tablet, Rfl: 11    pantoprazole (PROTONIX) 40 mg tablet, Take 1 tablet (40 mg total) by mouth 2 (two) times a day, Disp: 60 tablet, Rfl: 0  No Known Allergies    Labs:     Chemistry        Component Value Date/Time    K 4.0 04/10/2024 1550    K 4.8 07/30/2020 1231     04/10/2024 1550     07/30/2020 1231    CO2 26 04/10/2024 1550    CO2 27 07/30/2020 1231    BUN 17 04/10/2024 1550    BUN 18 07/30/2020 1231    CREATININE 1.08 04/10/2024 1550    CREATININE 1.12 07/30/2020 1231        Component Value Date/Time    CALCIUM 8.8 04/10/2024 1550    CALCIUM 9.2 07/30/2020 1231    ALKPHOS 62 04/10/2024 1550    ALKPHOS 72 07/30/2020 1231    AST 17 04/10/2024 1550    AST 21 07/30/2020 1231    ALT 19 04/10/2024 1550    ALT 43 07/30/2020 1231        Lipid panel 12/10/2023: C 209. T 306. H 35. L 113.     Imaging:   ECG 4/10/2024: Sinus tachycardia. LAD. Inferior infarct. Rate 101 bpm.    ECG 12/21/2023: Normal sinus rhythm. LAD. Inferior infarct. Anterolateral infarct. Rate 68 bpm.     Cardiac catheterization 12/11/2023  s/p thrombolytic therapy for inferior stemi with successful clinical reperfusion. Cath today with no significant obstructive disease requiring PCI. Minimal LM. 30% mid LAD. 50% mid Lcx. 40% mid RCA.     Echo complete 12/10/2023  EF 55%. Hypokinesis of the posterobasal segment of the LV. Mild MR. Trace TR.     ECG 12/9/2023: Normal sinus rhythm with sinus arrhthmia. Inferior-posterior infact with acute inferior infarct.    Review of Systems   Constitutional: Positive for malaise/fatigue.   HENT: Negative.     Cardiovascular:  Positive for dyspnea on exertion and leg swelling. Negative for chest pain, irregular heartbeat, near-syncope, orthopnea and palpitations.   Respiratory:  Positive for cough and shortness of breath. Negative for snoring.    Endocrine: Negative.    Skin: Negative.   "  Musculoskeletal: Negative.    Gastrointestinal: Negative.    Genitourinary: Negative.    Neurological: Negative.    Psychiatric/Behavioral: Negative.         Vitals:    04/10/24 1456   BP: 122/72   Pulse: 102   Temp: 98 °F (36.7 °C)   SpO2: 99%     Vitals:    04/10/24 1456   Weight: 101 kg (223 lb 3.2 oz)     Height: 5' 10\" (177.8 cm)   Body mass index is 32.03 kg/m².    Physical Exam  Vitals and nursing note reviewed.   Constitutional:       General: He is not in acute distress.     Appearance: He is well-developed. He is obese. He is not diaphoretic.      Comments: Pallor with very pale mucous membranes   HENT:      Head: Normocephalic and atraumatic.      Mouth/Throat:      Mouth: Mucous membranes are pale.   Neck:      Vascular: No carotid bruit or JVD.   Cardiovascular:      Rate and Rhythm: Regular rhythm. Tachycardia present.      Pulses: Intact distal pulses.      Heart sounds: Normal heart sounds, S1 normal and S2 normal. No murmur heard.     No friction rub. No gallop.      Comments: +1 pitting edema to bilateral lower legs  Pulmonary:      Effort: Pulmonary effort is normal. No respiratory distress.      Breath sounds: Normal breath sounds.   Abdominal:      General: There is no distension.      Palpations: Abdomen is soft.      Tenderness: There is no abdominal tenderness.   Skin:     General: Skin is warm and dry.      Findings: No rash.   Neurological:      Mental Status: He is alert and oriented to person, place, and time.   Psychiatric:         Behavior: Behavior normal.                "

## 2024-04-10 ENCOUNTER — TELEPHONE (OUTPATIENT)
Dept: GASTROENTEROLOGY | Facility: CLINIC | Age: 59
End: 2024-04-10

## 2024-04-10 ENCOUNTER — TELEPHONE (OUTPATIENT)
Dept: CARDIOLOGY CLINIC | Facility: CLINIC | Age: 59
End: 2024-04-10

## 2024-04-10 ENCOUNTER — HOSPITAL ENCOUNTER (OUTPATIENT)
Dept: RADIOLOGY | Facility: HOSPITAL | Age: 59
Discharge: HOME | End: 2024-04-10
Payer: COMMERCIAL

## 2024-04-10 ENCOUNTER — APPOINTMENT (OUTPATIENT)
Dept: LAB | Facility: HOSPITAL | Age: 59
End: 2024-04-10
Payer: COMMERCIAL

## 2024-04-10 ENCOUNTER — OFFICE VISIT (OUTPATIENT)
Dept: CARDIOLOGY CLINIC | Facility: CLINIC | Age: 59
End: 2024-04-10
Payer: COMMERCIAL

## 2024-04-10 VITALS
DIASTOLIC BLOOD PRESSURE: 72 MMHG | SYSTOLIC BLOOD PRESSURE: 122 MMHG | HEART RATE: 102 BPM | HEIGHT: 70 IN | WEIGHT: 223.2 LBS | TEMPERATURE: 98 F | OXYGEN SATURATION: 99 % | BODY MASS INDEX: 31.95 KG/M2

## 2024-04-10 DIAGNOSIS — R06.02 SHORTNESS OF BREATH: ICD-10-CM

## 2024-04-10 DIAGNOSIS — R60.0 LOCALIZED EDEMA: ICD-10-CM

## 2024-04-10 DIAGNOSIS — K21.9 GASTROESOPHAGEAL REFLUX DISEASE WITHOUT ESOPHAGITIS: ICD-10-CM

## 2024-04-10 DIAGNOSIS — I25.2 HISTORY OF ST ELEVATION MYOCARDIAL INFARCTION (STEMI): ICD-10-CM

## 2024-04-10 DIAGNOSIS — E66.09 CLASS 1 OBESITY DUE TO EXCESS CALORIES WITHOUT SERIOUS COMORBIDITY WITH BODY MASS INDEX (BMI) OF 30.0 TO 30.9 IN ADULT: ICD-10-CM

## 2024-04-10 DIAGNOSIS — E78.49 OTHER HYPERLIPIDEMIA: ICD-10-CM

## 2024-04-10 DIAGNOSIS — I25.10 CORONARY ARTERY DISEASE INVOLVING NATIVE CORONARY ARTERY OF NATIVE HEART WITHOUT ANGINA PECTORIS: Primary | ICD-10-CM

## 2024-04-10 DIAGNOSIS — E11.9 TYPE 2 DIABETES MELLITUS WITHOUT COMPLICATION, WITH LONG-TERM CURRENT USE OF INSULIN (HCC): ICD-10-CM

## 2024-04-10 DIAGNOSIS — Z79.4 TYPE 2 DIABETES MELLITUS WITHOUT COMPLICATION, WITH LONG-TERM CURRENT USE OF INSULIN (HCC): ICD-10-CM

## 2024-04-10 DIAGNOSIS — D64.9 ANEMIA, UNSPECIFIED TYPE: Primary | ICD-10-CM

## 2024-04-10 LAB
ALBUMIN SERPL BCP-MCNC: 4.1 G/DL (ref 3.5–5)
ALP SERPL-CCNC: 62 U/L (ref 34–104)
ALT SERPL W P-5'-P-CCNC: 19 U/L (ref 7–52)
ANION GAP SERPL CALCULATED.3IONS-SCNC: 5 MMOL/L (ref 4–13)
AST SERPL W P-5'-P-CCNC: 17 U/L (ref 13–39)
BILIRUB DIRECT SERPL-MCNC: 0.1 MG/DL (ref 0–0.2)
BILIRUB SERPL-MCNC: 0.34 MG/DL (ref 0.2–1)
BNP SERPL-MCNC: 124 PG/ML (ref 0–100)
BUN SERPL-MCNC: 17 MG/DL (ref 5–25)
CALCIUM SERPL-MCNC: 8.8 MG/DL (ref 8.4–10.2)
CHLORIDE SERPL-SCNC: 106 MMOL/L (ref 96–108)
CO2 SERPL-SCNC: 26 MMOL/L (ref 21–32)
CREAT SERPL-MCNC: 1.08 MG/DL (ref 0.6–1.3)
ERYTHROCYTE [DISTWIDTH] IN BLOOD BY AUTOMATED COUNT: 16.2 % (ref 11.6–15.1)
GFR SERPL CREATININE-BSD FRML MDRD: 74 ML/MIN/1.73SQ M
GLUCOSE SERPL-MCNC: 198 MG/DL (ref 65–140)
HCT VFR BLD AUTO: 24.2 % (ref 36.5–49.3)
HGB BLD-MCNC: 7.1 G/DL (ref 12–17)
MCH RBC QN AUTO: 21.5 PG (ref 26.8–34.3)
MCHC RBC AUTO-ENTMCNC: 29.3 G/DL (ref 31.4–37.4)
MCV RBC AUTO: 73 FL (ref 82–98)
PLATELET # BLD AUTO: 237 THOUSANDS/UL (ref 149–390)
PMV BLD AUTO: 9.8 FL (ref 8.9–12.7)
POTASSIUM SERPL-SCNC: 4 MMOL/L (ref 3.5–5.3)
PROT SERPL-MCNC: 6.2 G/DL (ref 6.4–8.4)
RBC # BLD AUTO: 3.3 MILLION/UL (ref 3.88–5.62)
SODIUM SERPL-SCNC: 137 MMOL/L (ref 135–147)
WBC # BLD AUTO: 5.24 THOUSAND/UL (ref 4.31–10.16)

## 2024-04-10 PROCEDURE — 93000 ELECTROCARDIOGRAM COMPLETE: CPT | Performed by: NURSE PRACTITIONER

## 2024-04-10 PROCEDURE — 83880 ASSAY OF NATRIURETIC PEPTIDE: CPT

## 2024-04-10 PROCEDURE — 71046 X-RAY EXAM CHEST 2 VIEWS: CPT

## 2024-04-10 PROCEDURE — 36415 COLL VENOUS BLD VENIPUNCTURE: CPT

## 2024-04-10 PROCEDURE — 85027 COMPLETE CBC AUTOMATED: CPT

## 2024-04-10 PROCEDURE — 80048 BASIC METABOLIC PNL TOTAL CA: CPT

## 2024-04-10 PROCEDURE — 99214 OFFICE O/P EST MOD 30 MIN: CPT | Performed by: NURSE PRACTITIONER

## 2024-04-10 RX ORDER — FERROUS SULFATE 324(65)MG
324 TABLET, DELAYED RELEASE (ENTERIC COATED) ORAL
Qty: 30 TABLET | Refills: 11 | Status: SHIPPED | OUTPATIENT
Start: 2024-04-10

## 2024-04-10 RX ORDER — INSULIN GLARGINE 100 [IU]/ML
34 INJECTION, SOLUTION SUBCUTANEOUS
COMMUNITY
Start: 2024-03-24

## 2024-04-10 RX ORDER — PANTOPRAZOLE SODIUM 40 MG/1
40 TABLET, DELAYED RELEASE ORAL 2 TIMES DAILY
Qty: 60 TABLET | Refills: 0 | Status: SHIPPED | OUTPATIENT
Start: 2024-04-10 | End: 2024-04-11

## 2024-04-10 RX ORDER — FUROSEMIDE 20 MG/1
20 TABLET ORAL DAILY
Qty: 30 TABLET | Refills: 11 | Status: SHIPPED | OUTPATIENT
Start: 2024-04-10

## 2024-04-10 NOTE — PATIENT INSTRUCTIONS
Lab work and chest xray today.  Start furosemide 20  mg daily. I will contact you with further instructions after today's testing.  Echo to reassess heart function.  Please be sure to take aspirin and Brilinta with food. Report any black stool or obvious bleeding.

## 2024-04-10 NOTE — ASSESSMENT & PLAN NOTE
Stopped pantoprazole and has been taking OTC Nexium.  Notes GI upset.  Change to pantoprazole 40 mg BID.  Check CBC due to pallor on exam.  Plan for GI evaluation.  Urged him to take DAPT with food.

## 2024-04-10 NOTE — TELEPHONE ENCOUNTER
Hi,    Can we please get him into see someone ASAP, ideally this week?  He has new onset anemia and likely needs scopes    Thank you

## 2024-04-10 NOTE — ASSESSMENT & PLAN NOTE
Lab Results   Component Value Date    HGBA1C 14.0 (H) 12/10/2023     [facility]  Did not start GLP1 RA due to concern for GI side effects.

## 2024-04-10 NOTE — TELEPHONE ENCOUNTER
----- Message from NEVAEH Yost sent at 4/10/2024  5:25 PM EDT -----  Regarding: urgent GI eval  Hello!  I am so sorry to bother you. This patient of mine had a heart attack in December and is on aspirin and Brilinta. He came today looking very pale and was short of breath. He has been having GI upset but denies ruth bleeding. Hemoglobin came back today at 7.  He had stopped his PPI so I ordered Protonix 40 mg twice daily for now. I was hoping to have a GI evaluation for him in the near future if possible.  I am reaching out to his PCP as well but wanted to see if someone in your office would be able to see him?  Thank you in advance!!  Alexandra Holt

## 2024-04-10 NOTE — TELEPHONE ENCOUNTER
I got Brent's lab results back. Hemoglobin has dropped from 13 to 7.  I recommended hospital for blood transfusion and urgent GI evaluation as I suspect gut source of bleeding.He has been having GI upset for months. He denies ruth bleeding. He declines going to the hospital  I asked him at his visit to take aspirin and Brilinta with food. I am very reluctant to stop as he had a heart attack less than 3 months ago.  I ordered pantoprazole 40 mg twice daily and a daily iron. I will reach out to GI to arrange for asap evaluation. I will also reach out to his PCP to see about arrangements for iron infusions.  Repeat CBC and iron by Monday. Advised to seek emergent medical attention if worsening symptoms or obvious bleeding. He and his wife agreed.

## 2024-04-10 NOTE — ASSESSMENT & PLAN NOTE
A. Inferior STEMI 12/9/23.  B. Cardiac cath 12/11/2023: s/p thrombolytic therapy with successful clinical reperfusion. Residual MLI in LM, 30% LAD(m), 50% Lcx(m), 40% RCA(m).  C. Echo 12/10/23 with EF 55% and hypokinesis of the posterobasal wall.  - - - - -  No recurrent chest pain.  Presents today with pallor, tachycardia, and shortness of breath/edema. Concern for blood loss. Will obtain CBC, BMP, BNP.  Update echo.  Remains on DAPT of ASA and Brilinta - urged that he take with food.   Atorvastatin 80 mg daily for goal LDL < 55.  Metoprolol succinate 25 mg daily.  Cardiac rehab at [facility].  We reviewed urgent s/s to report.  Will monitor closely.

## 2024-04-10 NOTE — ASSESSMENT & PLAN NOTE
See discussion under CAD  S/P inferior STEMI 12/9/23 with TNK with successful re-perfusion.  Per guidelines should remain on DAPT for 12 months uninterrupted.

## 2024-04-10 NOTE — ASSESSMENT & PLAN NOTE
Body mass index is 32.03 kg/m².  Reviewed dietary and exercise modifications for weight control.  Will monitor.

## 2024-04-11 ENCOUNTER — TELEPHONE (OUTPATIENT)
Dept: GASTROENTEROLOGY | Facility: CLINIC | Age: 59
End: 2024-04-11

## 2024-04-11 ENCOUNTER — TELEPHONE (OUTPATIENT)
Age: 59
End: 2024-04-11

## 2024-04-11 ENCOUNTER — TELEPHONE (OUTPATIENT)
Dept: HEMATOLOGY ONCOLOGY | Facility: CLINIC | Age: 59
End: 2024-04-11

## 2024-04-11 DIAGNOSIS — E11.69 TYPE 2 DIABETES MELLITUS WITH OTHER SPECIFIED COMPLICATION, WITHOUT LONG-TERM CURRENT USE OF INSULIN (HCC): ICD-10-CM

## 2024-04-11 RX ORDER — CLOPIDOGREL 300 MG/1
300 TABLET, FILM COATED ORAL ONCE
Qty: 1 TABLET | Refills: 0 | Status: SHIPPED | OUTPATIENT
Start: 2024-04-11 | End: 2024-04-11

## 2024-04-11 RX ORDER — PANTOPRAZOLE SODIUM 40 MG/1
40 TABLET, DELAYED RELEASE ORAL 2 TIMES DAILY
Qty: 60 TABLET | Refills: 5 | Status: SHIPPED | OUTPATIENT
Start: 2024-04-11

## 2024-04-11 RX ORDER — CLOPIDOGREL BISULFATE 75 MG/1
75 TABLET ORAL DAILY
Qty: 30 TABLET | Refills: 11 | Status: SHIPPED | OUTPATIENT
Start: 2024-04-11

## 2024-04-11 NOTE — TELEPHONE ENCOUNTER
Pt's wife Destiny called & stated that pt was referred to see a Gastro doctor. Destiny tried to schedule an appt at the [facility] in [place] but the are not able to see pt's referral in chart.     Destiny is requesting if the referral can be faxed to her & the office in [place].     [facility] fax # [phone]    Destiny's fax # [phone]    Contact # [phone]

## 2024-04-11 NOTE — TELEPHONE ENCOUNTER
I called and talked to the patient's wife Destiny, gave him a Urgent appointment per Dr. Lott on     4/12/2024 Status: Caitlyn    Time: 10:20 AM Length: 20   Visit Type: FOLLOW UP PG [[id number]] Copay: $40.00   Provider: Chris Helms, DO     She expressed understanding

## 2024-04-11 NOTE — TELEPHONE ENCOUNTER
Spoke with Brent. He is agreeable to the plan. Will transition to Plavix. He is aware he will stop aspirin and Brilinta then start Plavix - first dose 300 mg x1 then next day start 75 mg daily. He verbalized understanding of this instruction. He is scheduled to meet with GI tomorrow.    I will be in touch when I get more info about scheduling an iron infusion and with his repeat lab results.

## 2024-04-11 NOTE — TELEPHONE ENCOUNTER
I called Brent to let him know we have 1 opening today at 10 am with Patricia Otto PA-C here in the [place] office. LMOM if he can not make that appointment to call us back so we can reschedule.

## 2024-04-11 NOTE — TELEPHONE ENCOUNTER
I attempted to reach Brent regarding further medication recommendations. I left a voicemail for him/his spouse to call back.  My message:  I reviewed his case with Dr. Valladares, my attending cardiologist. He is suggesting that we transition him off aspirin and Brilinta to Plavix to help reduce bleeding risk.  I was also able to get in touch with GI and they are working on getting him scheduled with them asap.    I am working on trying to get iron infusions arranged currently.

## 2024-04-11 NOTE — TELEPHONE ENCOUNTER
Appointment Confirmation   Who are you speaking with? Patient   If it is not the patient, are they listed on an active communication consent form? N/A   Which provider is the appointment scheduled with?  NEVAEH Mclain   When is the appointment scheduled?  Please list date and time 4/12 1pm   At which location is the appointment scheduled to take place? [place]   Did caller verbalize understanding of appointment details? Yes

## 2024-04-11 NOTE — TELEPHONE ENCOUNTER
I called patient and talked to his wife Destiny, she gave me Brent's cell phone # I did try calling it but I had to leave a message. We do have an appointment today at 10 am with Patricia Otto PA-C. I did try calling 3 times. I called Destiny back to le her know I did try calling Brent and I had to leave a message on his answering machine and on her answering machine.

## 2024-04-12 ENCOUNTER — TELEPHONE (OUTPATIENT)
Age: 59
End: 2024-04-12

## 2024-04-12 ENCOUNTER — APPOINTMENT (OUTPATIENT)
Dept: LAB | Facility: HOSPITAL | Age: 59
End: 2024-04-12
Payer: COMMERCIAL

## 2024-04-12 ENCOUNTER — OFFICE VISIT (OUTPATIENT)
Dept: HEMATOLOGY ONCOLOGY | Facility: CLINIC | Age: 59
End: 2024-04-12
Payer: COMMERCIAL

## 2024-04-12 ENCOUNTER — OFFICE VISIT (OUTPATIENT)
Age: 59
End: 2024-04-12

## 2024-04-12 VITALS
WEIGHT: 223 LBS | HEART RATE: 92 BPM | DIASTOLIC BLOOD PRESSURE: 64 MMHG | RESPIRATION RATE: 18 BRPM | HEIGHT: 70 IN | OXYGEN SATURATION: 95 % | SYSTOLIC BLOOD PRESSURE: 124 MMHG | BODY MASS INDEX: 31.92 KG/M2

## 2024-04-12 VITALS
BODY MASS INDEX: 31.21 KG/M2 | OXYGEN SATURATION: 97 % | SYSTOLIC BLOOD PRESSURE: 124 MMHG | HEIGHT: 70 IN | TEMPERATURE: 98.1 F | DIASTOLIC BLOOD PRESSURE: 62 MMHG | HEART RATE: 87 BPM | WEIGHT: 218 LBS

## 2024-04-12 DIAGNOSIS — D51.9 ANEMIA DUE TO VITAMIN B12 DEFICIENCY, UNSPECIFIED B12 DEFICIENCY TYPE: ICD-10-CM

## 2024-04-12 DIAGNOSIS — I25.2 HISTORY OF ST ELEVATION MYOCARDIAL INFARCTION (STEMI): ICD-10-CM

## 2024-04-12 DIAGNOSIS — D62 ACUTE BLOOD LOSS ANEMIA: ICD-10-CM

## 2024-04-12 DIAGNOSIS — E53.8 FOLATE DEFICIENCY: ICD-10-CM

## 2024-04-12 DIAGNOSIS — D50.0 IRON DEFICIENCY ANEMIA DUE TO CHRONIC BLOOD LOSS: ICD-10-CM

## 2024-04-12 DIAGNOSIS — K92.1 MELENA: ICD-10-CM

## 2024-04-12 DIAGNOSIS — D62 ACUTE BLOOD LOSS ANEMIA: Primary | ICD-10-CM

## 2024-04-12 DIAGNOSIS — K92.1 MELENA: Primary | ICD-10-CM

## 2024-04-12 DIAGNOSIS — I25.10 CORONARY ARTERY DISEASE INVOLVING NATIVE CORONARY ARTERY OF NATIVE HEART WITHOUT ANGINA PECTORIS: ICD-10-CM

## 2024-04-12 LAB
ERYTHROCYTE [DISTWIDTH] IN BLOOD BY AUTOMATED COUNT: 16.3 % (ref 11.6–15.1)
FERRITIN SERPL-MCNC: 4 NG/ML (ref 24–336)
FOLATE SERPL-MCNC: 15.4 NG/ML
HCT VFR BLD AUTO: 25.3 % (ref 36.5–49.3)
HGB BLD-MCNC: 7.3 G/DL (ref 12–17)
MCH RBC QN AUTO: 21.5 PG (ref 26.8–34.3)
MCHC RBC AUTO-ENTMCNC: 28.9 G/DL (ref 31.4–37.4)
MCV RBC AUTO: 74 FL (ref 82–98)
PLATELET # BLD AUTO: 250 THOUSANDS/UL (ref 149–390)
PMV BLD AUTO: 8.9 FL (ref 8.9–12.7)
RBC # BLD AUTO: 3.4 MILLION/UL (ref 3.88–5.62)
VIT B12 SERPL-MCNC: 348 PG/ML (ref 180–914)
WBC # BLD AUTO: 5.34 THOUSAND/UL (ref 4.31–10.16)

## 2024-04-12 PROCEDURE — 83550 IRON BINDING TEST: CPT

## 2024-04-12 PROCEDURE — 83540 ASSAY OF IRON: CPT

## 2024-04-12 PROCEDURE — 82728 ASSAY OF FERRITIN: CPT

## 2024-04-12 PROCEDURE — 36415 COLL VENOUS BLD VENIPUNCTURE: CPT

## 2024-04-12 PROCEDURE — 99205 OFFICE O/P NEW HI 60 MIN: CPT | Performed by: NURSE PRACTITIONER

## 2024-04-12 PROCEDURE — 82746 ASSAY OF FOLIC ACID SERUM: CPT

## 2024-04-12 PROCEDURE — 85027 COMPLETE CBC AUTOMATED: CPT

## 2024-04-12 PROCEDURE — 82607 VITAMIN B-12: CPT

## 2024-04-12 RX ORDER — BLOOD SUGAR DIAGNOSTIC
STRIP MISCELLANEOUS
Qty: 200 EACH | Refills: 5 | Status: SHIPPED | OUTPATIENT
Start: 2024-04-12

## 2024-04-12 RX ORDER — BISACODYL 5 MG/1
20 TABLET, DELAYED RELEASE ORAL ONCE
Qty: 4 TABLET | Refills: 0 | Status: SHIPPED | OUTPATIENT
Start: 2024-04-12 | End: 2024-04-12

## 2024-04-12 RX ORDER — POLYETHYLENE GLYCOL 3350 17 G/17G
238 POWDER, FOR SOLUTION ORAL ONCE
Qty: 238 G | Refills: 0 | Status: SHIPPED | OUTPATIENT
Start: 2024-04-12 | End: 2024-04-12

## 2024-04-12 NOTE — ASSESSMENT & PLAN NOTE
Had recent cardiac cath on 12/11/2023 status post thrombolytic therapy.  He did not have any stents placed but was on dual antiplatelet therapy with aspirin and Brilinta.  Concern for GI bleeding with acute blood loss anemia as noted above.  Has been changed to Plavix monotherapy today.    Okay to continue Plavix for now  Plan for bidirectional endoscopy as noted above  We will request clearance to potentially hold Plavix for 5 days prior to his procedures  However, if patient is deemed to be at high risk for coronary event, okay to continue Plavix with plans for diagnostic endoscopic evaluation

## 2024-04-12 NOTE — TELEPHONE ENCOUNTER
He may hold the Plavix starting tomorrow through 4/16 then resume as soon as clinically safe.  Thank you!!

## 2024-04-12 NOTE — ASSESSMENT & PLAN NOTE
Acute decline in hemoglobin with evidence of melena.  Concerning for GI bleeding likely exacerbated by dual antiplatelet therapy following coronary event in December.  Bleeding appears to have stopped this patient is reportedly feeling better.    Plan for bidirectional endoscopy as noted above  Monitoring of blood counts  Continue Protonix for gastroprotection  He is scheduled for IV iron infusion and repeat CBC which was ordered by cardiologist  Follow-up CBC

## 2024-04-12 NOTE — H&P (VIEW-ONLY)
[facility]  Outpatient Consultation  Encounter: [id number]     PATIENT INFO     Name: Brent Hall  YOB: 1965   Age: 58 y.o.   Sex: male   MRN: [id number]    ASSESSMENT & PLAN     Brent Hall is a 58 y.o. male with history of STEMI in December 2023 recently on dual antiplatelet therapy with aspirin and Brilinta and changed to Plavix monotherapy today who presents to GI office for urgent visit for acute blood loss anemia with melena and concerns for GI bleeding.    Problem List Items Addressed This Visit       History of ST elevation myocardial infarction (STEMI)    Coronary artery disease involving native coronary artery of native heart without angina pectoris     Had recent cardiac cath on 12/11/2023 status post thrombolytic therapy.  He did not have any stents placed but was on dual antiplatelet therapy with aspirin and Brilinta.  Concern for GI bleeding with acute blood loss anemia as noted above.  Has been changed to Plavix monotherapy today.    Okay to continue Plavix for now  Plan for bidirectional endoscopy as noted above  We will request clearance to potentially hold Plavix for 5 days prior to his procedures  However, if patient is deemed to be at high risk for coronary event, okay to continue Plavix with plans for diagnostic endoscopic evaluation         Melena - Primary     Reportedly having episodes of melena at home.  Bleeding was likely exacerbated by dual antiplatelet therapy.  Patient was on aspirin and Brilinta until yesterday.  Patient was started on Plavix today by cardiologist.  He is on Plavix monotherapy.  I suspect he may have had bleeding from peptic ulcer versus AVM versus Dieulafoy lesion versus possibly neoplasm or polyp versus malignancy versus other.    We will try to expedite bidirectional endoscopy for evaluation  In the meantime, he should continue Protonix 40 mg twice daily for gastroprotection  Okay to continue Plavix at this time given  recent MI although he does not have any stents in place  We will reach out to his cardiologist regarding possibly holding Plavix for 5 days prior to procedure  However, if patient is deemed to be at high risk for holding antiplatelet therapy, we will plan on proceeding with bidirectional endoscopy on Plavix to address source of bleeding  I discussed this with the patient and he is agreeable    I obtained informed consent from the patient.  The risks/benefits/alternatives of the procedure were discussed with the patient.  Risks included, but not limited to, infection, bleeding, perforation, injury to organs in the abdomen, missed lesion and incomplete procedure were discussed.  Patient was agreeable and electronic signature was obtained.          Relevant Medications    polyethylene glycol (MiraLax) 17 GM/SCOOP powder    bisacodyl (DULCOLAX) 5 mg EC tablet    Other Relevant Orders    EGD    Colonoscopy    Acute blood loss anemia     Acute decline in hemoglobin with evidence of melena.  Concerning for GI bleeding likely exacerbated by dual antiplatelet therapy following coronary event in December.  Bleeding appears to have stopped this patient is reportedly feeling better.    Plan for bidirectional endoscopy as noted above  Monitoring of blood counts  Continue Protonix for gastroprotection  He is scheduled for IV iron infusion and repeat CBC which was ordered by cardiologist  Follow-up CBC         Relevant Medications    polyethylene glycol (MiraLax) 17 GM/SCOOP powder    bisacodyl (DULCOLAX) 5 mg EC tablet    Other Relevant Orders    EGD    Colonoscopy     Orders Placed This Encounter   Procedures    EGD    Colonoscopy       FOLLOW-UP: Depending on endoscopic findings    HISTORY OF PRESENT ILLNESS       Brent Hall is a 58 y.o. male who presents to GI office for reported acute blood loss anemia and possible melena.  Patient is new to this office.  Patient has been referred by his cardiologist.    Patient has history  of STEMI in December 2023.  At that time, patient underwent cardiac cath with thrombolytic therapy.  No stents were placed.  He was started on dual antiplatelet therapy with aspirin and Brilinta.  He reports that over the last few weeks, he has noticed black stools which are tarry in appearance.  He has denied any hematochezia.  He did experience some abdominal discomfort possibly related to the bleeding.  He admits to using ibuprofen once or twice a week but denies frequent or daily use.  No prior history of GI bleeding issues.  He had outpatient CBC performed by his cardiologist which showed significant decline in hemoglobin from a baseline of approximately 13 down to 7.  He was recommended to urgently schedule appointment with GI.  He was unable to schedule an appointment with GI at [place].    Aspirin and Brilinta were stopped yesterday and he was started on Plavix monotherapy today.  He was also started on Protonix twice daily today.  He does feel better at this time.     ENDOSCOPIC HISTORY     UPPER ENDOSCOPY: None    COLONOSCOPY: Several years ago at another facility; report not available for review    REVIEW OF SYSTEMS     CONSTITUTIONAL: Denies any fever, chills, rigors, and weight loss  HEENT: No earache or tinnitus, denies hearing loss or visual disturbances  CARDIOVASCULAR: No chest pain or palpitations  RESPIRATORY: Denies any cough, hemoptysis, shortness of breath or dyspnea on exertion  GASTROINTESTINAL: As noted in the History of Present Illness  GENITOURINARY: No problems with urination, denies any hematuria or dysuria  NEUROLOGIC: No dizziness or vertigo, denies headaches   MUSCULOSKELETAL: Denies any muscle or joint pain   SKIN: Denies jaundice or itching  PSYCHOSOCIAL: Denies depression or anxiety, denies any recent memory loss     Historical Information   Past Medical History:   Diagnosis Date    CAD (coronary artery disease)     Diabetes mellitus (HCC)     Hypertension     Obesity (BMI  30-39.9)      Past Surgical History:   Procedure Laterality Date    CARDIAC CATHETERIZATION Left 2023    Procedure: Cardiac Left Heart Cath;  Surgeon: Nacho Salinas DO;  [facility];  Service: Cardiology    CARDIAC CATHETERIZATION N/A 2023    Procedure: Cardiac Coronary Angiogram;  Surgeon: Nacho Salinas DO;  [facility];  Service: Cardiology    CARDIAC CATHETERIZATION  2023    Procedure: Cardiac catheterization;  Surgeon: Nacho Salinas DO;  [facility];  Service: Cardiology    ULNAR NERVE REPAIR Left 2018    WRIST ARTHRODESIS Bilateral     2014     Social History   Social History     Substance and Sexual Activity   Alcohol Use Not Currently    Comment: very rare     Social History     Substance and Sexual Activity   Drug Use Never     Social History     Tobacco Use   Smoking Status Former    Current packs/day: 0.00    Types: Cigarettes    Quit date: 2010    Years since quittin.3   Smokeless Tobacco Never     Family History   Problem Relation Age of Onset    Diabetes Mother     Diabetes Father     Valvular heart disease Father     Atrial fibrillation Father     Diabetes Sister     Lung disease Sister     Heart failure Sister        MEDICATIONS & ALLERGIES     Current Outpatient Medications   Medication Instructions    Alcohol Swabs 70 % PADS May substitute brand based on insurance coverage. Check glucose ACHS.    atorvastatin (LIPITOR) 80 mg, Oral, Daily with dinner    bisacodyl (DULCOLAX) 20 mg, Oral, Once    Blood Glucose Monitoring Suppl (OneTouch Verio Reflect) w/Device KIT May substitute brand based on insurance coverage. Check glucose ACHS.    clopidogrel (PLAVIX) 300 mg, Oral, Once    clopidogrel (PLAVIX) 75 mg, Oral, Daily    ferrous sulfate 324 mg, Oral, Daily before breakfast    Fiasp FlexTouch 100 units/mL injection pen E11.65 Inject 10 units with meals plus scale. TDD 50 units    furosemide  "(LASIX) 20 mg, Oral, Daily    glucose blood (OneTouch Verio) test strip Check blood sugars 4 times daily    Insulin Pen Needle (BD Pen Needle Andreia 2nd Gen) 32G X 4 MM MISC 4 times daily insulin    Insulin Pen Needle (BD Pen Needle Andreia 2nd Gen) 32G X 4 MM MISC For use with insulin pen. Pharmacy may dispense brand covered by insurance.    Lantus SoloStar 34 Units, Subcutaneous, Daily at bedtime    metoprolol succinate (TOPROL-XL) 25 mg, Oral, Daily    nitroglycerin (NITROSTAT) 0.4 mg, Sublingual, Every 5 minutes PRN    OneTouch Delica Lancets 33G MISC May substitute brand based on insurance coverage. Check glucose ACHS.    pantoprazole (PROTONIX) 40 mg, Oral, 2 times daily    polyethylene glycol (MIRALAX) 238 g, Oral, Once, Take 238 g my mouth. Use as directed     No Known Allergies    PHYSICAL EXAM      Objective   Blood pressure 124/64, pulse 92, resp. rate 18, height 5' 10\" (1.778 m), weight 101 kg (223 lb), SpO2 95%. Body mass index is 32 kg/m².    General Appearance:   Alert, cooperative, no distress   HEENT:   Normocephalic, atraumatic, anicteric     Neck:   Supple, symmetrical, trachea midline   Lungs:   Equal chest rise, respirations unlabored    Heart:   Regular rate   Abdomen:   Soft, non-tender, non-distended; normal bowel sounds; no masses, no organomegaly    Rectal:   Deferred    Extremities:   No cyanosis or edema    Neuro:   Moves all 4 extremities    Skin:   No jaundice, rashes, or lesions       LABORATORY RESULTS     Appointment on 04/12/2024   Component Date Value    WBC 04/12/2024 5.34     RBC 04/12/2024 3.40 (L)     Hemoglobin 04/12/2024 7.3 (L)     Hematocrit 04/12/2024 25.3 (L)     MCV 04/12/2024 74 (L)     MCH 04/12/2024 21.5 (L)     MCHC 04/12/2024 28.9 (L)     RDW 04/12/2024 16.3 (H)     Platelets 04/12/2024 250     MPV 04/12/2024 8.9         IMAGING RESULTS     X-ray chest 2 views    Result Date: 4/10/2024  Narrative: CHEST INDICATION:   Shortness of breath. COMPARISON:  None. EXAM " PERFORMED/VIEWS:  XR CHEST PA & LATERAL FINDINGS: Cardiomediastinal silhouette appears unremarkable. The lungs are clear.  No pneumothorax or pleural effusion. Osseous structures appear within normal limits for patient age.     Impression: No acute cardiopulmonary disease. No significant change from prior study of 12/9/2023 Electronically signed: 04/10/2024 05:16 PM Gurjit Santillan MD    I have personally reviewed any available and pertinent imaging study reports.      Chris Helms D.O.  [facility]  Division of Gastroenterology & Hepatology  Available on TigerText  [email or web address]    ** Please Note: This note is constructed using a voice recognition dictation system. **

## 2024-04-12 NOTE — TELEPHONE ENCOUNTER
Our mutual patient is scheduled for procedure: COLON?EGD    On: 04/16/2024      With: Dr. Ortega    He/She is taking the following blood thinner: Plavix    Can this be stopped starting tomorrow days prior to the procedure?      Physician Approving clearance: maryjo culver

## 2024-04-12 NOTE — PROGRESS NOTES
[facility]  Outpatient Consultation  Encounter: [id number]     PATIENT INFO     Name: Brent Hall  YOB: 1965   Age: 58 y.o.   Sex: male   MRN: [id number]    ASSESSMENT & PLAN     Brent Hall is a 58 y.o. male with history of STEMI in December 2023 recently on dual antiplatelet therapy with aspirin and Brilinta and changed to Plavix monotherapy today who presents to GI office for urgent visit for acute blood loss anemia with melena and concerns for GI bleeding.    Problem List Items Addressed This Visit       History of ST elevation myocardial infarction (STEMI)    Coronary artery disease involving native coronary artery of native heart without angina pectoris     Had recent cardiac cath on 12/11/2023 status post thrombolytic therapy.  He did not have any stents placed but was on dual antiplatelet therapy with aspirin and Brilinta.  Concern for GI bleeding with acute blood loss anemia as noted above.  Has been changed to Plavix monotherapy today.    Okay to continue Plavix for now  Plan for bidirectional endoscopy as noted above  We will request clearance to potentially hold Plavix for 5 days prior to his procedures  However, if patient is deemed to be at high risk for coronary event, okay to continue Plavix with plans for diagnostic endoscopic evaluation         Melena - Primary     Reportedly having episodes of melena at home.  Bleeding was likely exacerbated by dual antiplatelet therapy.  Patient was on aspirin and Brilinta until yesterday.  Patient was started on Plavix today by cardiologist.  He is on Plavix monotherapy.  I suspect he may have had bleeding from peptic ulcer versus AVM versus Dieulafoy lesion versus possibly neoplasm or polyp versus malignancy versus other.    We will try to expedite bidirectional endoscopy for evaluation  In the meantime, he should continue Protonix 40 mg twice daily for gastroprotection  Okay to continue Plavix at this time given  recent MI although he does not have any stents in place  We will reach out to his cardiologist regarding possibly holding Plavix for 5 days prior to procedure  However, if patient is deemed to be at high risk for holding antiplatelet therapy, we will plan on proceeding with bidirectional endoscopy on Plavix to address source of bleeding  I discussed this with the patient and he is agreeable    I obtained informed consent from the patient.  The risks/benefits/alternatives of the procedure were discussed with the patient.  Risks included, but not limited to, infection, bleeding, perforation, injury to organs in the abdomen, missed lesion and incomplete procedure were discussed.  Patient was agreeable and electronic signature was obtained.          Relevant Medications    polyethylene glycol (MiraLax) 17 GM/SCOOP powder    bisacodyl (DULCOLAX) 5 mg EC tablet    Other Relevant Orders    EGD    Colonoscopy    Acute blood loss anemia     Acute decline in hemoglobin with evidence of melena.  Concerning for GI bleeding likely exacerbated by dual antiplatelet therapy following coronary event in December.  Bleeding appears to have stopped this patient is reportedly feeling better.    Plan for bidirectional endoscopy as noted above  Monitoring of blood counts  Continue Protonix for gastroprotection  He is scheduled for IV iron infusion and repeat CBC which was ordered by cardiologist  Follow-up CBC         Relevant Medications    polyethylene glycol (MiraLax) 17 GM/SCOOP powder    bisacodyl (DULCOLAX) 5 mg EC tablet    Other Relevant Orders    EGD    Colonoscopy     Orders Placed This Encounter   Procedures    EGD    Colonoscopy       FOLLOW-UP: Depending on endoscopic findings    HISTORY OF PRESENT ILLNESS       Brent Hall is a 58 y.o. male who presents to GI office for reported acute blood loss anemia and possible melena.  Patient is new to this office.  Patient has been referred by his cardiologist.    Patient has history  of STEMI in December 2023.  At that time, patient underwent cardiac cath with thrombolytic therapy.  No stents were placed.  He was started on dual antiplatelet therapy with aspirin and Brilinta.  He reports that over the last few weeks, he has noticed black stools which are tarry in appearance.  He has denied any hematochezia.  He did experience some abdominal discomfort possibly related to the bleeding.  He admits to using ibuprofen once or twice a week but denies frequent or daily use.  No prior history of GI bleeding issues.  He had outpatient CBC performed by his cardiologist which showed significant decline in hemoglobin from a baseline of approximately 13 down to 7.  He was recommended to urgently schedule appointment with GI.  He was unable to schedule an appointment with GI at [place].    Aspirin and Brilinta were stopped yesterday and he was started on Plavix monotherapy today.  He was also started on Protonix twice daily today.  He does feel better at this time.     ENDOSCOPIC HISTORY     UPPER ENDOSCOPY: None    COLONOSCOPY: Several years ago at another facility; report not available for review    REVIEW OF SYSTEMS     CONSTITUTIONAL: Denies any fever, chills, rigors, and weight loss  HEENT: No earache or tinnitus, denies hearing loss or visual disturbances  CARDIOVASCULAR: No chest pain or palpitations  RESPIRATORY: Denies any cough, hemoptysis, shortness of breath or dyspnea on exertion  GASTROINTESTINAL: As noted in the History of Present Illness  GENITOURINARY: No problems with urination, denies any hematuria or dysuria  NEUROLOGIC: No dizziness or vertigo, denies headaches   MUSCULOSKELETAL: Denies any muscle or joint pain   SKIN: Denies jaundice or itching  PSYCHOSOCIAL: Denies depression or anxiety, denies any recent memory loss     Historical Information   Past Medical History:   Diagnosis Date    CAD (coronary artery disease)     Diabetes mellitus (HCC)     Hypertension     Obesity (BMI  30-39.9)      Past Surgical History:   Procedure Laterality Date    CARDIAC CATHETERIZATION Left 2023    Procedure: Cardiac Left Heart Cath;  Surgeon: Nacho Salinas DO;  [facility];  Service: Cardiology    CARDIAC CATHETERIZATION N/A 2023    Procedure: Cardiac Coronary Angiogram;  Surgeon: Nacho Salinas DO;  [facility];  Service: Cardiology    CARDIAC CATHETERIZATION  2023    Procedure: Cardiac catheterization;  Surgeon: Nacho Salinas DO;  [facility];  Service: Cardiology    ULNAR NERVE REPAIR Left 2018    WRIST ARTHRODESIS Bilateral     2014     Social History   Social History     Substance and Sexual Activity   Alcohol Use Not Currently    Comment: very rare     Social History     Substance and Sexual Activity   Drug Use Never     Social History     Tobacco Use   Smoking Status Former    Current packs/day: 0.00    Types: Cigarettes    Quit date: 2010    Years since quittin.3   Smokeless Tobacco Never     Family History   Problem Relation Age of Onset    Diabetes Mother     Diabetes Father     Valvular heart disease Father     Atrial fibrillation Father     Diabetes Sister     Lung disease Sister     Heart failure Sister        MEDICATIONS & ALLERGIES     Current Outpatient Medications   Medication Instructions    Alcohol Swabs 70 % PADS May substitute brand based on insurance coverage. Check glucose ACHS.    atorvastatin (LIPITOR) 80 mg, Oral, Daily with dinner    bisacodyl (DULCOLAX) 20 mg, Oral, Once    Blood Glucose Monitoring Suppl (OneTouch Verio Reflect) w/Device KIT May substitute brand based on insurance coverage. Check glucose ACHS.    clopidogrel (PLAVIX) 300 mg, Oral, Once    clopidogrel (PLAVIX) 75 mg, Oral, Daily    ferrous sulfate 324 mg, Oral, Daily before breakfast    Fiasp FlexTouch 100 units/mL injection pen E11.65 Inject 10 units with meals plus scale. TDD 50 units    furosemide  "(LASIX) 20 mg, Oral, Daily    glucose blood (OneTouch Verio) test strip Check blood sugars 4 times daily    Insulin Pen Needle (BD Pen Needle Andreia 2nd Gen) 32G X 4 MM MISC 4 times daily insulin    Insulin Pen Needle (BD Pen Needle Andreia 2nd Gen) 32G X 4 MM MISC For use with insulin pen. Pharmacy may dispense brand covered by insurance.    Lantus SoloStar 34 Units, Subcutaneous, Daily at bedtime    metoprolol succinate (TOPROL-XL) 25 mg, Oral, Daily    nitroglycerin (NITROSTAT) 0.4 mg, Sublingual, Every 5 minutes PRN    OneTouch Delica Lancets 33G MISC May substitute brand based on insurance coverage. Check glucose ACHS.    pantoprazole (PROTONIX) 40 mg, Oral, 2 times daily    polyethylene glycol (MIRALAX) 238 g, Oral, Once, Take 238 g my mouth. Use as directed     No Known Allergies    PHYSICAL EXAM      Objective   Blood pressure 124/64, pulse 92, resp. rate 18, height 5' 10\" (1.778 m), weight 101 kg (223 lb), SpO2 95%. Body mass index is 32 kg/m².    General Appearance:   Alert, cooperative, no distress   HEENT:   Normocephalic, atraumatic, anicteric     Neck:   Supple, symmetrical, trachea midline   Lungs:   Equal chest rise, respirations unlabored    Heart:   Regular rate   Abdomen:   Soft, non-tender, non-distended; normal bowel sounds; no masses, no organomegaly    Rectal:   Deferred    Extremities:   No cyanosis or edema    Neuro:   Moves all 4 extremities    Skin:   No jaundice, rashes, or lesions       LABORATORY RESULTS     Appointment on 04/12/2024   Component Date Value    WBC 04/12/2024 5.34     RBC 04/12/2024 3.40 (L)     Hemoglobin 04/12/2024 7.3 (L)     Hematocrit 04/12/2024 25.3 (L)     MCV 04/12/2024 74 (L)     MCH 04/12/2024 21.5 (L)     MCHC 04/12/2024 28.9 (L)     RDW 04/12/2024 16.3 (H)     Platelets 04/12/2024 250     MPV 04/12/2024 8.9         IMAGING RESULTS     X-ray chest 2 views    Result Date: 4/10/2024  Narrative: CHEST INDICATION:   Shortness of breath. COMPARISON:  None. EXAM " PERFORMED/VIEWS:  XR CHEST PA & LATERAL FINDINGS: Cardiomediastinal silhouette appears unremarkable. The lungs are clear.  No pneumothorax or pleural effusion. Osseous structures appear within normal limits for patient age.     Impression: No acute cardiopulmonary disease. No significant change from prior study of 12/9/2023 Electronically signed: 04/10/2024 05:16 PM Gurjit Santillan MD    I have personally reviewed any available and pertinent imaging study reports.      Chris Helms D.O.  [facility]  Division of Gastroenterology & Hepatology  Available on TigerText  [email or web address]    ** Please Note: This note is constructed using a voice recognition dictation system. **

## 2024-04-12 NOTE — PROGRESS NOTES
[facility]M ON[facility] [place]  [address] [facility] [id number] [phone]  Hematology Ambulatory Consult  Brent Hall, 1965, [id number]  4/12/2024      Assessment and Plan   1. Anemia due to vitamin B12 deficiency, unspecified B12 deficiency type  2. Acute blood loss anemia  3. Melena  4. Iron deficiency anemia due to chronic blood loss  5. Folate deficiency   Patient is a 58-year-old male with a history of CAD, type 2 diabetes, hypertension, obesity, status post STEMI in December 2023 started on dual platelet therapy with aspirin and Brilinta.  He was found to have a hemoglobin of 7.1 on 4/10/2024, MCV 73.  Previous CBC was within normal limits, hemoglobin was 13.3.  He was recently seen by GI for acute blood loss anemia and melena.  It was felt bleeding was exacerbated by dual antiplatelet therapy therefore it was discontinued on 4/11/2024.  Plavix was started by cardiology.  He was seen by GI who plans to do bidirectional scopes next week.  He was referred to us for further management of anemia.   His PCP had requested an iron panel that was not done yet.  We will add vitamin B12 and folate to the orders.  I will also request an updated H&H to monitor.  Should his hemoglobin be less than 7.1 I recommend a blood transfusion.  Patient would prefer not to have a blood transfusion unless absolutely necessary.  We discussed starting oral iron until we are able to make arrangements for infusions if needed.   Etiology of acute anemia, melena, likely secondary to dual platelet therapy that is now since discontinued.  Other considerations include gastric ulcer versus an undiagnosed malignancy.  Patient was started on Protonix this morning by gastroenterology.  I will contact him with the lab results and we will manage accordingly.    - Hemoglobin and hematocrit, blood; Future  - Iron Panel (Includes Ferritin, Iron Sat%, Iron, and TIBC); Future  - Vitamin B12;  Future  - Folate; Future  - CBC; Standing    Patient verbalized understanding and is in agreement to the plan. Patient knows to call the Hematology/Oncology office with any questions and concerns regarding the above.    Barrier(s) to care: None.   The patient is able to self care.    Rochelle HERRING  Medical Oncology/Hematology  [facility]    Subjective   No chief complaint on file.      Referring provider    NEVAEH Yost  [address] [facility] [place]  [place] [address]    History of present illness:   Patient is a 58-year-old male with a history of CAD, type 2 diabetes, hypertension, obesity, status post STEMI in December 2023 started on dual platelet therapy with aspirin and Brilinta.  He was found to have a hemoglobin of 7.1 on 4/10/2024, MCV 73.  Previous CBC was within normal limits, hemoglobin was 13.3.  He was recently seen by GI for acute blood loss anemia and melena.  It was felt bleeding was exacerbated by dual antiplatelet therapy therefore it was discontinued on 4/11/2024.  Plavix was started by cardiology.  He was seen by GI who plans to do bidirectional scopes next week.  He was referred to us for further management of anemia.    04/12/24: clinically stable    Review of Systems   Constitutional:  Positive for fatigue. Negative for activity change, appetite change, fever and unexpected weight change.   Respiratory:  Negative for cough and shortness of breath.    Cardiovascular:  Negative for chest pain and leg swelling.   Gastrointestinal:  Negative for abdominal pain, constipation, diarrhea and nausea.   Endocrine: Negative for cold intolerance and heat intolerance.   Musculoskeletal:  Negative for arthralgias and myalgias.   Skin: Negative.    Neurological:  Negative for dizziness, weakness and headaches.   Hematological:  Negative for adenopathy. Does not bruise/bleed easily.     Past Medical History:   Diagnosis Date    CAD (coronary artery disease)     Diabetes  mellitus (HCC)     Hypertension     Obesity (BMI 30-39.9)      Past Surgical History:   Procedure Laterality Date    CARDIAC CATHETERIZATION Left 2023    Procedure: Cardiac Left Heart Cath;  Surgeon: Nacho Salinas DO;  Location: [facility];  Service: Cardiology    CARDIAC CATHETERIZATION N/A 2023    Procedure: Cardiac Coronary Angiogram;  Surgeon: Nacho Salinas DO;  Location: [facility];  Service: Cardiology    CARDIAC CATHETERIZATION  2023    Procedure: Cardiac catheterization;  Surgeon: Nacho Salinas DO;  Location: [facility];  Service: Cardiology    ULNAR NERVE REPAIR Left 2018    WRIST ARTHRODESIS Bilateral     2014     Family History   Problem Relation Age of Onset    Diabetes Mother     Diabetes Father     Valvular heart disease Father     Atrial fibrillation Father     Diabetes Sister     Lung disease Sister     Heart failure Sister      Social History     Socioeconomic History    Marital status: /Civil Union     Spouse name: None    Number of children: None    Years of education: None    Highest education level: None   Occupational History    None   Tobacco Use    Smoking status: Former     Current packs/day: 0.00     Types: Cigarettes     Quit date: 2010     Years since quittin.3    Smokeless tobacco: Never   Vaping Use    Vaping status: Never Used   Substance and Sexual Activity    Alcohol use: Not Currently     Comment: very rare    Drug use: Never    Sexual activity: None     Comment: defer   Other Topics Concern    None   Social History Narrative    None     Social Determinants of Health     Financial Resource Strain: Not on file   Food Insecurity: Not on file   Transportation Needs: Not on file   Physical Activity: Not on file   Stress: Not on file   Social Connections: Not on file   Intimate Partner Violence: Not on file   Housing Stability: Not on file     Current Outpatient Medications:     Alcohol Swabs 70  % PADS, May substitute brand based on insurance coverage. Check glucose ACHS., Disp: 200 each, Rfl: 0    atorvastatin (LIPITOR) 80 mg tablet, Take 1 tablet (80 mg total) by mouth daily with dinner, Disp: 90 tablet, Rfl: 3    bisacodyl (DULCOLAX) 5 mg EC tablet, Take 4 tablets (20 mg total) by mouth once for 1 dose, Disp: 4 tablet, Rfl: 0    Blood Glucose Monitoring Suppl (OneTouch Verio Reflect) w/Device KIT, May substitute brand based on insurance coverage. Check glucose ACHS., Disp: 1 kit, Rfl: 0    clopidogrel (Plavix) 75 mg tablet, Take 1 tablet (75 mg total) by mouth daily, Disp: 30 tablet, Rfl: 11    ferrous sulfate 324 (65 Fe) mg, Take 1 tablet (324 mg total) by mouth daily before breakfast, Disp: 30 tablet, Rfl: 11    Fiasp FlexTouch 100 units/mL injection pen, E11.65 Inject 10 units with meals plus scale. TDD 50 units, Disp: 45 mL, Rfl: 3    furosemide (LASIX) 20 mg tablet, Take 1 tablet (20 mg total) by mouth daily, Disp: 30 tablet, Rfl: 11    glucose blood (OneTouch Verio) test strip, Check blood sugars 4 times daily, Disp: 200 each, Rfl: 5    Insulin Pen Needle (BD Pen Needle Andreia 2nd Gen) 32G X 4 MM MISC, 4 times daily insulin, Disp: 400 each, Rfl: 1    Insulin Pen Needle (BD Pen Needle Andreia 2nd Gen) 32G X 4 MM MISC, For use with insulin pen. Pharmacy may dispense brand covered by insurance., Disp: 100 each, Rfl: 0    Lantus SoloStar 100 units/mL SOPN, Inject 34 Units under the skin daily at bedtime, Disp: , Rfl:     metoprolol succinate (TOPROL-XL) 25 mg 24 hr tablet, Take 1 tablet (25 mg total) by mouth daily, Disp: 90 tablet, Rfl: 3    nitroglycerin (NITROSTAT) 0.4 mg SL tablet, Place 1 tablet (0.4 mg total) under the tongue every 5 (five) minutes as needed for chest pain, Disp: 30 tablet, Rfl: 0    OneTouch Delica Lancets 33G MISC, May substitute brand based on insurance coverage. Check glucose ACHS., Disp: 200 each, Rfl: 0    pantoprazole (PROTONIX) 40 mg tablet, TAKE 1 TABLET BY MOUTH TWICE A DAY,  "Disp: 60 tablet, Rfl: 5    polyethylene glycol (MiraLax) 17 GM/SCOOP powder, Take 238 g by mouth once for 1 dose Take 238 g my mouth. Use as directed, Disp: 238 g, Rfl: 0    clopidogrel (PLAVIX) 300 mg, Take 1 tablet (300 mg total) by mouth once for 1 dose, Disp: 1 tablet, Rfl: 0  No Known Allergies    Objective   /62 (BP Location: Left arm, Patient Position: Sitting, Cuff Size: Standard)   Pulse 87   Temp 98.1 °F (36.7 °C) (Oral)   Ht 5' 10\" (1.778 m)   Wt 98.9 kg (218 lb)   SpO2 97%   BMI 31.28 kg/m²   Physical Exam  Vitals reviewed.   Constitutional:       Appearance: Normal appearance. He is well-developed.   HENT:      Head: Normocephalic and atraumatic.   Eyes:      Pupils: Pupils are equal, round, and reactive to light.   Pulmonary:      Effort: Pulmonary effort is normal. No respiratory distress.   Musculoskeletal:         General: Normal range of motion.      Cervical back: Normal range of motion.   Lymphadenopathy:      Cervical: No cervical adenopathy.   Skin:     General: Skin is dry.   Neurological:      Mental Status: He is alert and oriented to person, place, and time.   Psychiatric:         Behavior: Behavior normal.     Result Review  Labs:  Lab Results   Component Value Date    WBC 5.24 04/10/2024    HGB 7.1 (L) 04/10/2024    HCT 24.2 (L) 04/10/2024    MCV 73 (L) 04/10/2024     04/10/2024     Lab Results   Component Value Date    SODIUM 137 04/10/2024    K 4.0 04/10/2024     04/10/2024    CO2 26 04/10/2024    AGAP 5 04/10/2024    BUN 17 04/10/2024    CREATININE 1.08 04/10/2024    GLUC 198 (H) 04/10/2024    CALCIUM 8.8 04/10/2024    AST 17 04/10/2024    ALT 19 04/10/2024    ALKPHOS 62 04/10/2024    TP 6.2 (L) 04/10/2024    TBILI 0.34 04/10/2024    EGFR 74 04/10/2024     Imaging:   X-ray chest 2 views    Result Date: 4/10/2024  Narrative: CHEST INDICATION:   Shortness of breath. COMPARISON:  None. EXAM PERFORMED/VIEWS:  XR CHEST PA & LATERAL FINDINGS: Cardiomediastinal " silhouette appears unremarkable. The lungs are clear.  No pneumothorax or pleural effusion. Osseous structures appear within normal limits for patient age.     Impression: No acute cardiopulmonary disease. No significant change from prior study of 12/9/2023     Please note:  This report has been generated by a voice recognition software system. Therefore there may be syntax, spelling, and/or grammatical errors. Please call if you have any questions.

## 2024-04-12 NOTE — PATIENT INSTRUCTIONS
We will schedule you for upper endoscopy and colonoscopy  Please follow the instructions for the bowel prep  We will get clearance to hold your Plavix for your procedures  However, if cardiologist feels that Plavix cannot be held due to your coronary disease, we will proceed with doing the procedures on Plavix  We will also check blood work to reassess your hemoglobin level  If your hemoglobin level is less than 7, we recommend that you go to the nearest emergency room for evaluation and possible blood transfusion  Continue Protonix 40 mg twice daily for now        COLONOSCOPY  MIRALAX/Dulcolax Bowel Preparation Instructions    The OR/GI Lab will contact you the evening prior to your procedure with your exact arrival time.    Our practice requires a 1 week notice for any cancellations or rescheduling. We kindly ask that you immediately notify us of any changes including any new medications that are prescribed. Thank you for your cooperation.       WEEK BEFORE YOUR PROCEDURE:  Stop taking Iron tablets.  5 days prior, AVOID vegetables and fruits with skins or seeds, nuts, corn, popcorn and whole grain breads.   Purchase: One (1) 238-gram container of Miralax (polyethylene glycol 3350), four (4) 5 mg Dulcolax (bisacodyl) tablets, and one (1) 64-ounce bottle of Gatorade (sports drink) - no red, orange, or purple. These may be purchased at any pharmacy without a prescription. Generic products are permissible.   Arrange responsible transportation for day of the procedure.     DAY BEFORE THE PROCEDURE:   CLEAR liquids only for entire day prior. Nothing red, orange or purple.    You MAY have:                                                               Soda  Water  Broth Gatorade  Jello  Popsicles Coffee/tea without milk/creamer     YOU MAY NOT HAVE:  Solid foods   Milk and milk products    Juice with pulp    BOWEL PREPARATION:  Includes: One (1) 238-gram container of Miralax (polyethylene glycol 3350), four (4) 5 mg  Dulcolax (bisacodyl) tablets, and one (1) 64-ounce bottle of Gatorade (sports drink).  Preparation may be refrigerated.  Entire bowel prep should be completed.     Afternoon before the procedure (2:00 pm - 5:00 pm):    Take two (2) 5 mg Dulcolax laxative tablets.     Evening before the procedure (6:00 pm):  Mix entire container of Miralax with one (1) 64-ounce bottle of Gatorade and shake until all medication is dissolved.   Begin drinking solution. Drink an eight (8) ounce cup every 10-15 minutes until you have consumed half (32 ounces) of the solution.  Refrigerate remaining solution.    Night before the procedure (8:00 pm):  Take two (2) 5 mg Dulcolax laxative tablets.     Beginning 5 hours before your procedure:  Drink the remaining amount of prepared solution (32 ounces).  Drink an eight (8) ounce cup every 10-15 minutes until you have consumed the remaining solution.     Bowel prep should be completed 4 hours prior to procedure time.    NOTHING TO EAT OR DRINK AFTER MIDNIGHT- EXCEPT FOR YOUR PREP    DAY OF THE PROCEDURE:  You may brush your teeth.  Leave all jewelry at home.  Please arrive for your procedure as indicated by the OR / GI Lab / Endoscopy Unit. The hospital will contact you the day before with your exact arrival time.   Make sure you have arranged ahead of time for a responsible adult (18 or older) to accompany and drive you home after the procedure.  Please discuss any transportation concerns with our staff prior to your procedure.    The effects of the anesthesia can persist for 24 hours.  After receiving the sedation, you must exercise caution before engaging in any activity that could harm yourself and others (such as driving a car).  Do not make any important decisions or do not drink any alcoholic beverages during this time period.  After your procedure, you may have anything you'd like to eat or drink.  You will probably want to start with something light.  Please include plenty of fluids.   Avoid items that cause gas such as sodas and salads.    SPECIAL INSTRUCTIONS:    For patients currently taking blood thinners and/or antiplatelet therapy our office will contact the prescribing provider.  Our office will contact you with any required changes to your medication regimen.     Blood thinner (i.e. - Coumadin, Pradaxa, Lovenox, Xarelto, Eliquis)  ?  Continue (Do Not Stop)  ? Stop______________for_____________days prior to the procedure.    Antiplatelet (i.e. - Plavix, Aggrenox, Effient, Brilinta)  ?  Continue (Do Not Stop)  ? Stop______________for_____________days prior to the procedure.       Diabetes:   If you are Diabetic, please see separate Diabetic Instruction Sheet.          Prescribed medications:  Do not stop your aspirin, or any of your other medications (unless instructed otherwise).    Take the rest of your prescribed medications with small sips of water at least 2 hours prior to your procedure.      For any questions or concerns related to your bowel preparation or pre-procedure instructions, please contact our office at [phone].  Thank you for choosing [facility]!

## 2024-04-12 NOTE — ASSESSMENT & PLAN NOTE
Reportedly having episodes of melena at home.  Bleeding was likely exacerbated by dual antiplatelet therapy.  Patient was on aspirin and Brilinta until yesterday.  Patient was started on Plavix today by cardiologist.  He is on Plavix monotherapy.  I suspect he may have had bleeding from peptic ulcer versus AVM versus Dieulafoy lesion versus possibly neoplasm or polyp versus malignancy versus other.    We will try to expedite bidirectional endoscopy for evaluation  In the meantime, he should continue Protonix 40 mg twice daily for gastroprotection  Okay to continue Plavix at this time given recent MI although he does not have any stents in place  We will reach out to his cardiologist regarding possibly holding Plavix for 5 days prior to procedure  However, if patient is deemed to be at high risk for holding antiplatelet therapy, we will plan on proceeding with bidirectional endoscopy on Plavix to address source of bleeding  I discussed this with the patient and he is agreeable    I obtained informed consent from the patient.  The risks/benefits/alternatives of the procedure were discussed with the patient.  Risks included, but not limited to, infection, bleeding, perforation, injury to organs in the abdomen, missed lesion and incomplete procedure were discussed.  Patient was agreeable and electronic signature was obtained.

## 2024-04-14 LAB
IRON SATN MFR SERPL: 4 % (ref 15–50)
IRON SERPL-MCNC: 29 UG/DL (ref 50–212)
TIBC SERPL-MCNC: 719 UG/DL (ref 250–450)
UIBC SERPL-MCNC: 690 UG/DL (ref 155–355)

## 2024-04-16 ENCOUNTER — ANESTHESIA EVENT (OUTPATIENT)
Dept: PERIOP | Facility: HOSPITAL | Age: 59
End: 2024-04-16

## 2024-04-16 ENCOUNTER — ANESTHESIA (OUTPATIENT)
Dept: PERIOP | Facility: HOSPITAL | Age: 59
End: 2024-04-16

## 2024-04-16 ENCOUNTER — HOSPITAL ENCOUNTER (OUTPATIENT)
Dept: PERIOP | Facility: HOSPITAL | Age: 59
Setting detail: OUTPATIENT SURGERY
Discharge: HOME | End: 2024-04-16
Attending: STUDENT IN AN ORGANIZED HEALTH CARE EDUCATION/TRAINING PROGRAM
Payer: COMMERCIAL

## 2024-04-16 VITALS
TEMPERATURE: 98.1 F | OXYGEN SATURATION: 96 % | WEIGHT: 210 LBS | BODY MASS INDEX: 30.06 KG/M2 | HEIGHT: 70 IN | SYSTOLIC BLOOD PRESSURE: 114 MMHG | DIASTOLIC BLOOD PRESSURE: 60 MMHG | RESPIRATION RATE: 18 BRPM | HEART RATE: 81 BPM

## 2024-04-16 DIAGNOSIS — K92.1 MELENA: ICD-10-CM

## 2024-04-16 DIAGNOSIS — D62 ACUTE BLOOD LOSS ANEMIA: ICD-10-CM

## 2024-04-16 LAB — GLUCOSE SERPL-MCNC: 157 MG/DL (ref 65–140)

## 2024-04-16 PROCEDURE — 88305 TISSUE EXAM BY PATHOLOGIST: CPT | Performed by: PATHOLOGY

## 2024-04-16 PROCEDURE — 88342 IMHCHEM/IMCYTCHM 1ST ANTB: CPT | Performed by: PATHOLOGY

## 2024-04-16 PROCEDURE — 88341 IMHCHEM/IMCYTCHM EA ADD ANTB: CPT | Performed by: PATHOLOGY

## 2024-04-16 PROCEDURE — 82948 REAGENT STRIP/BLOOD GLUCOSE: CPT

## 2024-04-16 RX ORDER — SODIUM CHLORIDE, SODIUM LACTATE, POTASSIUM CHLORIDE, CALCIUM CHLORIDE 600; 310; 30; 20 MG/100ML; MG/100ML; MG/100ML; MG/100ML
100 INJECTION, SOLUTION INTRAVENOUS CONTINUOUS
Status: CANCELLED | OUTPATIENT
Start: 2024-04-16

## 2024-04-16 RX ORDER — SODIUM CHLORIDE, SODIUM LACTATE, POTASSIUM CHLORIDE, CALCIUM CHLORIDE 600; 310; 30; 20 MG/100ML; MG/100ML; MG/100ML; MG/100ML
100 INJECTION, SOLUTION INTRAVENOUS CONTINUOUS
Status: DISCONTINUED | OUTPATIENT
Start: 2024-04-16 | End: 2024-04-20 | Stop reason: HOSPADM

## 2024-04-16 RX ORDER — PROPOFOL 10 MG/ML
INJECTION, EMULSION INTRAVENOUS AS NEEDED
Status: DISCONTINUED | OUTPATIENT
Start: 2024-04-16 | End: 2024-04-16

## 2024-04-16 RX ORDER — LIDOCAINE HYDROCHLORIDE 20 MG/ML
INJECTION, SOLUTION EPIDURAL; INFILTRATION; INTRACAUDAL; PERINEURAL AS NEEDED
Status: DISCONTINUED | OUTPATIENT
Start: 2024-04-16 | End: 2024-04-16

## 2024-04-16 RX ADMIN — PROPOFOL 50 MG: 10 INJECTION, EMULSION INTRAVENOUS at 09:06

## 2024-04-16 RX ADMIN — PROPOFOL 150 MG: 10 INJECTION, EMULSION INTRAVENOUS at 08:59

## 2024-04-16 RX ADMIN — PROPOFOL 50 MG: 10 INJECTION, EMULSION INTRAVENOUS at 09:08

## 2024-04-16 RX ADMIN — PROPOFOL 50 MG: 10 INJECTION, EMULSION INTRAVENOUS at 09:04

## 2024-04-16 RX ADMIN — PROPOFOL 50 MG: 10 INJECTION, EMULSION INTRAVENOUS at 09:12

## 2024-04-16 RX ADMIN — SODIUM CHLORIDE, SODIUM LACTATE, POTASSIUM CHLORIDE, AND CALCIUM CHLORIDE 100 ML/HR: .6; .31; .03; .02 INJECTION, SOLUTION INTRAVENOUS at 08:12

## 2024-04-16 RX ADMIN — LIDOCAINE HYDROCHLORIDE 100 MG: 20 INJECTION, SOLUTION EPIDURAL; INFILTRATION; INTRACAUDAL; PERINEURAL at 08:59

## 2024-04-16 RX ADMIN — PROPOFOL 130 MCG/KG/MIN: 10 INJECTION, EMULSION INTRAVENOUS at 09:14

## 2024-04-16 RX ADMIN — PROPOFOL 50 MG: 10 INJECTION, EMULSION INTRAVENOUS at 09:01

## 2024-04-16 NOTE — INTERVAL H&P NOTE
H&P reviewed. After examining the patient I find no changes in the patients condition since the H&P had been written.    Vitals:    04/16/24 0800   BP: 129/64   Pulse: 98   Resp: 16   Temp: (!) 96.2 °F (35.7 °C)   SpO2: 96%

## 2024-04-16 NOTE — ANESTHESIA POSTPROCEDURE EVALUATION
Post-Op Assessment Note    CV Status:  Stable    Pain management: satisfactory to patient       Mental Status:  Sleepy   Hydration Status:  Euvolemic   PONV Controlled:  Controlled   Airway Patency:  Patent     Post Op Vitals Reviewed: Yes    No anethesia notable event occurred.    Staff: CRNA               BP   101/51   Temp   97.6   Pulse  85   Resp   16   SpO2   100

## 2024-04-16 NOTE — ANESTHESIA PREPROCEDURE EVALUATION
"Procedure:  EGD  COLONOSCOPY    Relevant Problems   CARDIO   (+) Coronary artery disease involving native coronary artery of native heart without angina pectoris   (+) Other hyperlipidemia      ENDO   (+) Type 2 diabetes mellitus without complication, with long-term current use of insulin (HCC)      GI/HEPATIC   (+) Gastroesophageal reflux disease without esophagitis      HEMATOLOGY   (+) Acute blood loss anemia      Other   (+) History of ST elevation myocardial infarction (STEMI)      Inferior STEMI 12/2023 s/p thrombolytic therapy without stent placement   Previously on DAPT w/ concern for ABLA changed to Plavix monotherapy and held as of 4/13 for today's procedure after cardiology clearance.    TTE 12/2023  LVEF 55%, nml diastolic fxn, RV nml, no hemodynamically significant valvular pathology    Lab Results   Component Value Date    WBC 5.34 04/12/2024    HGB 7.3 (L) 04/12/2024    HCT 25.3 (L) 04/12/2024    MCV 74 (L) 04/12/2024     04/12/2024     Lab Results   Component Value Date    K 4.0 04/10/2024    CO2 26 04/10/2024     04/10/2024    BUN 17 04/10/2024    CREATININE 1.08 04/10/2024     Lab Results   Component Value Date    INR 1.08 12/11/2023    INR 0.92 12/09/2023    PROTIME 13.9 12/11/2023    PROTIME 12.7 12/09/2023     Lab Results   Component Value Date    PTT 52 (H) 12/11/2023       No results found for: \"GLUCOSE\"    Lab Results   Component Value Date    HGBA1C 14.0 (H) 12/10/2023              Anesthesia Plan  ASA Score- 3     Anesthesia Type- IV sedation with anesthesia with ASA Monitors.         Additional Monitors:     Airway Plan:     Comment: Discussed cardiac risk factors given recency of STEMI. Will closely monitor hemodynamics in attempt to optimize modifiable factors today recognizing hiatus for DAPT in setting of possible GI bleed/today's procedure..       Plan Factors-Exercise tolerance (METS): >4 METS.    Chart reviewed.   Existing labs reviewed. Patient summary reviewed.      "             Induction- intravenous.    Postoperative Plan-     Informed Consent- Anesthetic plan and risks discussed with patient.  I personally reviewed this patient with the CRNA. Discussed and agreed on the Anesthesia Plan with the CRNA..

## 2024-04-19 ENCOUNTER — TELEPHONE (OUTPATIENT)
Dept: HEMATOLOGY ONCOLOGY | Facility: CLINIC | Age: 59
End: 2024-04-19

## 2024-04-19 ENCOUNTER — OFFICE VISIT (OUTPATIENT)
Dept: CARDIOLOGY CLINIC | Facility: CLINIC | Age: 59
End: 2024-04-19
Payer: COMMERCIAL

## 2024-04-19 VITALS
HEART RATE: 85 BPM | BODY MASS INDEX: 31.81 KG/M2 | HEIGHT: 70 IN | OXYGEN SATURATION: 98 % | TEMPERATURE: 98 F | WEIGHT: 222.2 LBS | DIASTOLIC BLOOD PRESSURE: 62 MMHG | SYSTOLIC BLOOD PRESSURE: 102 MMHG

## 2024-04-19 DIAGNOSIS — K92.1 MELENA: ICD-10-CM

## 2024-04-19 DIAGNOSIS — I25.10 CORONARY ARTERY DISEASE INVOLVING NATIVE CORONARY ARTERY OF NATIVE HEART WITHOUT ANGINA PECTORIS: Primary | ICD-10-CM

## 2024-04-19 DIAGNOSIS — D62 ACUTE BLOOD LOSS ANEMIA: Primary | ICD-10-CM

## 2024-04-19 DIAGNOSIS — K63.89 COLONIC MASS: ICD-10-CM

## 2024-04-19 DIAGNOSIS — Z79.4 TYPE 2 DIABETES MELLITUS WITHOUT COMPLICATION, WITH LONG-TERM CURRENT USE OF INSULIN (HCC): ICD-10-CM

## 2024-04-19 DIAGNOSIS — E11.9 TYPE 2 DIABETES MELLITUS WITHOUT COMPLICATION, WITH LONG-TERM CURRENT USE OF INSULIN (HCC): ICD-10-CM

## 2024-04-19 DIAGNOSIS — E78.49 OTHER HYPERLIPIDEMIA: ICD-10-CM

## 2024-04-19 DIAGNOSIS — R60.0 LOCALIZED EDEMA: ICD-10-CM

## 2024-04-19 DIAGNOSIS — D62 ACUTE BLOOD LOSS ANEMIA: ICD-10-CM

## 2024-04-19 DIAGNOSIS — I25.2 HISTORY OF ST ELEVATION MYOCARDIAL INFARCTION (STEMI): ICD-10-CM

## 2024-04-19 PROCEDURE — 99214 OFFICE O/P EST MOD 30 MIN: CPT | Performed by: NURSE PRACTITIONER

## 2024-04-19 NOTE — TELEPHONE ENCOUNTER
Called and spoke with patient and his wife regarding blood work next week.  We will plan to repeat CBC with other providers blood work.  We discussed previous iron saturation of 4% and ferritin level of 4 patient is already taking oral iron.  He prefers to hold off on arranging iron infusions until after CBC is done next week.  Follow-up appointment with me on May 28, 2024.  I also gave him other options of oral supplements since he has constipation with ferrous sulfate.  We discussed trying various fumarate or ferrous gluconate.  The other option is Vitron-C.  They verbalized understanding

## 2024-04-19 NOTE — ASSESSMENT & PLAN NOTE
Presented with tachycardia, fatigue, shortness of breath. Hemoglobin 7.1.  Now diagnosed with colon mass 4/16/2024. Awaiting colorectal surgery evaluation  Following with hematology awaiting iron infusions.

## 2024-04-19 NOTE — ASSESSMENT & PLAN NOTE
A. Inferior STEMI 12/9/23.  B. Cardiac cath 12/11/2023: s/p thrombolytic therapy with successful clinical reperfusion. Residual MLI in LM, 30% LAD(m), 50% Lcx(m), 40% RCA(m).  C. Echo 12/10/23 with EF 55% and hypokinesis of the posterobasal wall.  - - - - -  No recurrent chest pain.  Presents today with pallor, tachycardia, and shortness of breath/edema. Now diagnosed with colon mass.  Transitioned of DAPT to Plavix 75 mg daily due to bleeding.  Atorvastatin 80 mg daily for goal LDL < 55.  Metoprolol succinate 25 mg daily.  As he did not have stent placement ok for 5-7 day Plavix hold as needed for surgery.  Updated echo scheduled for 4/25/2024  Will monitor closely.

## 2024-04-19 NOTE — PROGRESS NOTES
Cardiology Follow Up    Brent Hall  1965  [id number]  [facility] [place]  [address]  [address]  [facility] [address] [phone]    Brent presents for close follow up of CAD, anemia, swelling.    1. Coronary artery disease involving native coronary artery of native heart without angina pectoris  Assessment & Plan:  A. Inferior STEMI 12/9/23.  B. Cardiac cath 12/11/2023: s/p thrombolytic therapy with successful clinical reperfusion. Residual MLI in LM, 30% LAD(m), 50% Lcx(m), 40% RCA(m).  C. Echo 12/10/23 with EF 55% and hypokinesis of the posterobasal wall.  - - - - -  No recurrent chest pain.  Presents today with pallor, tachycardia, and shortness of breath/edema. Now diagnosed with colon mass.  Transitioned of DAPT to Plavix 75 mg daily due to bleeding.  Atorvastatin 80 mg daily for goal LDL < 55.  Metoprolol succinate 25 mg daily.  As he did not have stent placement ok for 5-7 day Plavix hold as needed for surgery.  Updated echo scheduled for 4/25/2024  Will monitor closely.      2. History of ST elevation myocardial infarction (STEMI)  Assessment & Plan:  See discussion under CAD  S/P inferior STEMI 12/9/23 with TNK with successful re-perfusion.      3. Other hyperlipidemia  Assessment & Plan:  Goal LDL < 55.  On atorvastatin 80 mg daily.  Has order for updated lipid panel.      4. Localized edema  Assessment & Plan:  Secondary to severe anemia.  OK to continue furosemide 20 mg daily for now but repeat BMP within the week    Orders:  -     Basic metabolic panel; Future    5. Type 2 diabetes mellitus without complication, with long-term current use of insulin (HCC)  Assessment & Plan:    Lab Results   Component Value Date    HGBA1C 14.0 (H) 12/10/2023     Following with [facility] endocrinology.  Did not start GLP1 RA due to concern for GI side effects.      6. Acute blood loss anemia  Assessment & Plan:  Presented with tachycardia, fatigue,  "shortness of breath. Hemoglobin 7.1.  Now diagnosed with colon mass 4/16/2024. Awaiting colorectal surgery evaluation  Following with hematology awaiting iron infusions.      7. Colonic mass  Assessment & Plan:  Discovered by colonoscopy 4/16/2024  Awaiting colorectal surgery evaluation.  OK for 5-7 day Plavix hold as needed for surgery         HPI  Brent has a past medical history of CAD, inferior STEMI 12/9/2023 treated with TNK, HTN, HLD, T2DM.     Brent presented to [facility] 12/9/23 with ongoing mid sternal chest discomfort which he describes as \"indigestion\" lasting for hours and intermittent left arm discomfort. He had experienced similar symptoms 3 times prior in the past week while on a hunting trip but the symptoms resolved within 15 minutes. He went to the hospital where ECG showed inferior STEMI and HS trop was 1300. He was flown to [facility], however, transport was delayed and he received TNK which completely resolved his symptoms. Cardiac catheterization revealed mild, non-obstructive triple vessel disease. Echocardiogram showed EF 55% with hypokinesis of the posterobasal segment, no significant valvular abnormality. He was started on DAPT with ASA and Brilinta, atorvastatin 80 mg daily, and metoprolol succinate 25 mg daily. He was found to be diabetic as well with Ha1c of 14. He was started on insulin and given a referral to endocrinology. Prior to this admission he had not been to primary care in over a year. He was unaware of any medical issues leading up to this point. He does have a family history of diabetes. Father has valvular heart disease.      He followed up with me 12/21/2023 at which time he was doing well since hospitalization. He denied any recurrent anginal complaints. He did note blurring of vision since starting insulin and was scheduled to meet with endocrinology. He had not been monitoring his blood sugar and was encouraged to do so.    He followed up most recently 4/10/2024 at which time he " "felt \"terrible\" for months. He reported fatigue, shortness of breath, erectile dysfunction. In the past week he reported developing a frequent dry cough which is worse with exertion or lying down. No chest pain or overt lightheadedness. No syncope/near syncope. No palpitations. He had gained 17 pounds which he feels is due to being sedentary due to activity intolerance. He does admit to onset of leg swelling. He has been taking his medications faithfully and feels he is having side effects. He skipped a dose of metoprolol this morning to see if it would help him feel better. He reports having daily GI upset. He denies ruth bleeding or tarry stools. ECG shows ST at 101 bpm. I ordered blood work and chest xray. Chest xray was clear but labs showed anemia with drop in hemoglobin to 7.1. I changed him from aspirin and Brilinta to Plavix 75 mg daily and added BID Protonix. I placed an urgent referral to GI and hematology who saw him on 4/12/24. GI performed an EGD and colonoscopy on 4/16/24 which revealed a colon mass. He has been referred to colorectal surgery. Hematology is coordinating iron infusions.    4/19/2024: Brent presents for close follow up. We reviewed his colonoscopy results. He is waiting to hear from scheduling for colorectal surgery. He states he has stayed off the Plavix as he was not told to resume it. He states symptoms are unchanged. He continues with fatigue, shortness of breath, dry cough, and swelling. He states the swelling comes and goes. His weight is stable. He does not feel like the 20 mg of furosemide is doing much.     Medical Problems       Problem List       Transaminitis    Other hyperlipidemia    History of ST elevation myocardial infarction (STEMI)    Coronary artery disease involving native coronary artery of native heart without angina pectoris    Type 2 diabetes mellitus without complication, with long-term current use of insulin (HCC)    Gastroesophageal reflux disease without " esophagitis    Obesity    Melena    Acute blood loss anemia        Past Medical History:   Diagnosis Date    Anemia     CAD (coronary artery disease)     Diabetes mellitus (HCC)     Hypertension     Obesity (BMI 30-39.9)      Social History     Socioeconomic History    Marital status: /Civil Union     Spouse name: Not on file    Number of children: Not on file    Years of education: Not on file    Highest education level: Not on file   Occupational History    Not on file   Tobacco Use    Smoking status: Former     Current packs/day: 0.00     Types: Cigarettes     Quit date: 2010     Years since quittin.3    Smokeless tobacco: Never   Vaping Use    Vaping status: Never Used   Substance and Sexual Activity    Alcohol use: Not Currently     Comment: very rare    Drug use: Never    Sexual activity: Not on file     Comment: defer   Other Topics Concern    Not on file   Social History Narrative    Not on file     Social Determinants of Health     Financial Resource Strain: Not on file   Food Insecurity: Not on file   Transportation Needs: Not on file   Physical Activity: Not on file   Stress: Not on file   Social Connections: Not on file   Intimate Partner Violence: Not on file   Housing Stability: Not on file      Family History   Problem Relation Age of Onset    Diabetes Mother     Diabetes Father     Valvular heart disease Father     Atrial fibrillation Father     Diabetes Sister     Lung disease Sister     Heart failure Sister      Past Surgical History:   Procedure Laterality Date    CARDIAC CATHETERIZATION Left 2023    Procedure: Cardiac Left Heart Cath;  Surgeon: Nacho Salinas DO;  Location: [facility];  Service: Cardiology    CARDIAC CATHETERIZATION N/A 2023    Procedure: Cardiac Coronary Angiogram;  Surgeon: Nacho Salinas DO;  Location: [facility];  Service: Cardiology    CARDIAC CATHETERIZATION  2023    Procedure: Cardiac catheterization;   Surgeon: Nacho Salinas DO;  Location: [facility];  Service: Cardiology    ULNAR NERVE REPAIR Left 2018    WRIST ARTHRODESIS Bilateral     2019, 2014       Current Outpatient Medications:     Alcohol Swabs 70 % PADS, May substitute brand based on insurance coverage. Check glucose ACHS., Disp: 200 each, Rfl: 0    atorvastatin (LIPITOR) 80 mg tablet, Take 1 tablet (80 mg total) by mouth daily with dinner, Disp: 90 tablet, Rfl: 3    Blood Glucose Monitoring Suppl (OneTouch Verio Reflect) w/Device KIT, May substitute brand based on insurance coverage. Check glucose ACHS., Disp: 1 kit, Rfl: 0    Fiasp FlexTouch 100 units/mL injection pen, E11.65 Inject 10 units with meals plus scale. TDD 50 units, Disp: 45 mL, Rfl: 3    furosemide (LASIX) 20 mg tablet, Take 1 tablet (20 mg total) by mouth daily, Disp: 30 tablet, Rfl: 11    glucose blood (OneTouch Verio) test strip, Check blood sugars 4 times daily, Disp: 200 each, Rfl: 5    Insulin Pen Needle (BD Pen Needle Andreia 2nd Gen) 32G X 4 MM MISC, 4 times daily insulin, Disp: 400 each, Rfl: 1    Insulin Pen Needle (BD Pen Needle Andreia 2nd Gen) 32G X 4 MM MISC, For use with insulin pen. Pharmacy may dispense brand covered by insurance., Disp: 100 each, Rfl: 0    Lantus SoloStar 100 units/mL SOPN, Inject 34 Units under the skin daily at bedtime, Disp: , Rfl:     metoprolol succinate (TOPROL-XL) 25 mg 24 hr tablet, Take 1 tablet (25 mg total) by mouth daily, Disp: 90 tablet, Rfl: 3    nitroglycerin (NITROSTAT) 0.4 mg SL tablet, Place 1 tablet (0.4 mg total) under the tongue every 5 (five) minutes as needed for chest pain, Disp: 30 tablet, Rfl: 0    OneTouch Delica Lancets 33G MISC, May substitute brand based on insurance coverage. Check glucose ACHS., Disp: 200 each, Rfl: 0    pantoprazole (PROTONIX) 40 mg tablet, TAKE 1 TABLET BY MOUTH TWICE A DAY, Disp: 60 tablet, Rfl: 5    clopidogrel (Plavix) 75 mg tablet, Take 1 tablet (75 mg total) by mouth daily (Patient not  taking: Reported on 4/19/2024), Disp: 30 tablet, Rfl: 11    ferrous sulfate 324 (65 Fe) mg, Take 1 tablet (324 mg total) by mouth daily before breakfast (Patient not taking: Reported on 4/19/2024), Disp: 30 tablet, Rfl: 11  No current facility-administered medications for this visit.    Facility-Administered Medications Ordered in Other Visits:     lactated ringers infusion, 100 mL/hr, Intravenous, Continuous, Raheem Pierre MD, Stopped at 04/16/24 1035  No Known Allergies    Labs:     Chemistry        Component Value Date/Time    K 4.0 04/10/2024 1550    K 4.8 07/30/2020 1231     04/10/2024 1550     07/30/2020 1231    CO2 26 04/10/2024 1550    CO2 27 07/30/2020 1231    BUN 17 04/10/2024 1550    BUN 18 07/30/2020 1231    CREATININE 1.08 04/10/2024 1550    CREATININE 1.12 07/30/2020 1231        Component Value Date/Time    CALCIUM 8.8 04/10/2024 1550    CALCIUM 9.2 07/30/2020 1231    ALKPHOS 62 04/10/2024 1550    ALKPHOS 72 07/30/2020 1231    AST 17 04/10/2024 1550    AST 21 07/30/2020 1231    ALT 19 04/10/2024 1550    ALT 43 07/30/2020 1231        Lipid panel 12/10/2023: C 209. T 306. H 35. L 113.     Imaging:   ECG 4/10/2024: Sinus tachycardia. LAD. Inferior infarct. Rate 101 bpm.     ECG 12/21/2023: Normal sinus rhythm. LAD. Inferior infarct. Anterolateral infarct. Rate 68 bpm.     Cardiac catheterization 12/11/2023  s/p thrombolytic therapy for inferior stemi with successful clinical reperfusion. Cath today with no significant obstructive disease requiring PCI. Minimal LM. 30% mid LAD. 50% mid Lcx. 40% mid RCA.     Echo complete 12/10/2023  EF 55%. Hypokinesis of the posterobasal segment of the LV. Mild MR. Trace TR.     ECG 12/9/2023: Normal sinus rhythm with sinus arrhthmia. Inferior-posterior infact with acute inferior infarct.    Review of Systems   Constitutional: Positive for malaise/fatigue.   HENT: Negative.     Cardiovascular:  Positive for dyspnea on exertion and leg swelling. Negative for  "chest pain, irregular heartbeat, near-syncope, orthopnea and palpitations.   Respiratory:  Positive for cough and shortness of breath. Negative for snoring.    Endocrine: Negative.    Skin: Negative.    Musculoskeletal: Negative.    Gastrointestinal: Negative.    Genitourinary: Negative.    Neurological: Negative.    Psychiatric/Behavioral: Negative.         Vitals:    04/19/24 1356   BP: 102/62   Pulse: 85   Temp: 98 °F (36.7 °C)   SpO2: 98%     Vitals:    04/19/24 1356   Weight: 101 kg (222 lb 3.2 oz)     Height: 5' 10\" (177.8 cm)   Body mass index is 31.88 kg/m².    Physical Exam  Vitals and nursing note reviewed.   Constitutional:       General: He is not in acute distress.     Appearance: He is well-developed. He is obese. He is not diaphoretic.      Comments: pale   HENT:      Head: Normocephalic and atraumatic.   Neck:      Vascular: No carotid bruit or JVD.   Cardiovascular:      Rate and Rhythm: Normal rate and regular rhythm.      Pulses: Intact distal pulses.      Heart sounds: Normal heart sounds, S1 normal and S2 normal. No murmur heard.     No friction rub. No gallop.      Comments: Mild bilateral leg edema  Pulmonary:      Effort: Pulmonary effort is normal. No respiratory distress.      Breath sounds: Normal breath sounds.   Abdominal:      General: There is no distension.      Palpations: Abdomen is soft.      Tenderness: There is no abdominal tenderness.   Skin:     General: Skin is warm and dry.      Findings: No rash.   Neurological:      Mental Status: He is alert and oriented to person, place, and time.   Psychiatric:         Behavior: Behavior normal.                "

## 2024-04-19 NOTE — ASSESSMENT & PLAN NOTE
Lab Results   Component Value Date    HGBA1C 14.0 (H) 12/10/2023     [facility].  Did not start GLP1 RA due to concern for GI side effects.

## 2024-04-19 NOTE — ASSESSMENT & PLAN NOTE
Secondary to severe anemia.  OK to continue furosemide 20 mg daily for now but repeat BMP within the week

## 2024-04-19 NOTE — ASSESSMENT & PLAN NOTE
Discovered by colonoscopy 4/16/2024  Awaiting colorectal surgery evaluation.  OK for 5-7 day Plavix hold as needed for surgery

## 2024-04-19 NOTE — PATIENT INSTRUCTIONS
Blood work next week. Based on results we potentially could increase the lasix - will let you  know. If you gain 3 pounds in 1 day or 5 pounds in 1 week you can take 40 mg.  I reached out to GI about resuming Plavix. I will be in touch with his response. I would like you on at least aspirin due to the nonobstructive plaque in your coronary arteries and the recent heart attack.  Continue metoprolol.  Contact me with any concerns.

## 2024-04-20 ENCOUNTER — APPOINTMENT (OUTPATIENT)
Dept: LAB | Facility: HOSPITAL | Age: 59
End: 2024-04-20
Payer: COMMERCIAL

## 2024-04-20 DIAGNOSIS — D62 ACUTE BLOOD LOSS ANEMIA: Primary | ICD-10-CM

## 2024-04-20 DIAGNOSIS — D64.9 ANEMIA, UNSPECIFIED TYPE: ICD-10-CM

## 2024-04-20 DIAGNOSIS — K92.1 MELENA: ICD-10-CM

## 2024-04-20 DIAGNOSIS — E11.69 TYPE 2 DIABETES MELLITUS WITH OTHER SPECIFIED COMPLICATION, WITHOUT LONG-TERM CURRENT USE OF INSULIN (HCC): ICD-10-CM

## 2024-04-20 DIAGNOSIS — D51.9 ANEMIA DUE TO VITAMIN B12 DEFICIENCY, UNSPECIFIED B12 DEFICIENCY TYPE: ICD-10-CM

## 2024-04-20 DIAGNOSIS — R60.0 LOCALIZED EDEMA: ICD-10-CM

## 2024-04-20 DIAGNOSIS — D62 ACUTE BLOOD LOSS ANEMIA: ICD-10-CM

## 2024-04-20 DIAGNOSIS — D50.0 IRON DEFICIENCY ANEMIA DUE TO CHRONIC BLOOD LOSS: ICD-10-CM

## 2024-04-20 DIAGNOSIS — E53.8 FOLATE DEFICIENCY: ICD-10-CM

## 2024-04-20 LAB
ANION GAP SERPL CALCULATED.3IONS-SCNC: 8 MMOL/L (ref 4–13)
BASOPHILS # BLD AUTO: 0.02 THOUSANDS/ÂΜL (ref 0–0.1)
BASOPHILS NFR BLD AUTO: 0 % (ref 0–1)
BUN SERPL-MCNC: 19 MG/DL (ref 5–25)
CALCIUM SERPL-MCNC: 9 MG/DL (ref 8.4–10.2)
CHLORIDE SERPL-SCNC: 105 MMOL/L (ref 96–108)
CO2 SERPL-SCNC: 25 MMOL/L (ref 21–32)
CREAT SERPL-MCNC: 1.18 MG/DL (ref 0.6–1.3)
EOSINOPHIL # BLD AUTO: 0.1 THOUSAND/ÂΜL (ref 0–0.61)
EOSINOPHIL NFR BLD AUTO: 2 % (ref 0–6)
ERYTHROCYTE [DISTWIDTH] IN BLOOD BY AUTOMATED COUNT: 17.2 % (ref 11.6–15.1)
GFR SERPL CREATININE-BSD FRML MDRD: 67 ML/MIN/1.73SQ M
GLUCOSE SERPL-MCNC: 164 MG/DL (ref 65–140)
HCT VFR BLD AUTO: 26 % (ref 36.5–49.3)
HGB BLD-MCNC: 7.1 G/DL (ref 12–17)
IMM GRANULOCYTES # BLD AUTO: 0.01 THOUSAND/UL (ref 0–0.2)
IMM GRANULOCYTES NFR BLD AUTO: 0 % (ref 0–2)
LYMPHOCYTES # BLD AUTO: 1 THOUSANDS/ÂΜL (ref 0.6–4.47)
LYMPHOCYTES NFR BLD AUTO: 19 % (ref 14–44)
MCH RBC QN AUTO: 20.3 PG (ref 26.8–34.3)
MCHC RBC AUTO-ENTMCNC: 27.3 G/DL (ref 31.4–37.4)
MCV RBC AUTO: 74 FL (ref 82–98)
MONOCYTES # BLD AUTO: 0.6 THOUSAND/ÂΜL (ref 0.17–1.22)
MONOCYTES NFR BLD AUTO: 11 % (ref 4–12)
NEUTROPHILS # BLD AUTO: 3.63 THOUSANDS/ÂΜL (ref 1.85–7.62)
NEUTS SEG NFR BLD AUTO: 68 % (ref 43–75)
NRBC BLD AUTO-RTO: 0 /100 WBCS
PLATELET # BLD AUTO: 236 THOUSANDS/UL (ref 149–390)
PMV BLD AUTO: 10.2 FL (ref 8.9–12.7)
POTASSIUM SERPL-SCNC: 4.3 MMOL/L (ref 3.5–5.3)
RBC # BLD AUTO: 3.5 MILLION/UL (ref 3.88–5.62)
SODIUM SERPL-SCNC: 138 MMOL/L (ref 135–147)
WBC # BLD AUTO: 5.36 THOUSAND/UL (ref 4.31–10.16)

## 2024-04-20 PROCEDURE — 85025 COMPLETE CBC W/AUTO DIFF WBC: CPT

## 2024-04-20 PROCEDURE — 36415 COLL VENOUS BLD VENIPUNCTURE: CPT

## 2024-04-20 PROCEDURE — 80048 BASIC METABOLIC PNL TOTAL CA: CPT

## 2024-04-22 ENCOUNTER — TELEPHONE (OUTPATIENT)
Dept: NON INVASIVE DIAGNOSTICS | Facility: HOSPITAL | Age: 59
End: 2024-04-22

## 2024-04-22 NOTE — TELEPHONE ENCOUNTER
Left message for pt. BMP is stable. Hematology messaged him about his CBC. No dose change on furosemide at present. Will watch for echo results this week.

## 2024-04-23 ENCOUNTER — APPOINTMENT (OUTPATIENT)
Dept: LAB | Facility: HOSPITAL | Age: 59
End: 2024-04-23
Payer: COMMERCIAL

## 2024-04-23 ENCOUNTER — PATIENT OUTREACH (OUTPATIENT)
Dept: HEMATOLOGY ONCOLOGY | Facility: CLINIC | Age: 59
End: 2024-04-23

## 2024-04-23 ENCOUNTER — DOCUMENTATION (OUTPATIENT)
Dept: HEMATOLOGY ONCOLOGY | Facility: CLINIC | Age: 59
End: 2024-04-23

## 2024-04-23 DIAGNOSIS — K92.1 MELENA: ICD-10-CM

## 2024-04-23 DIAGNOSIS — C18.9 ADENOCARCINOMA OF COLON (HCC): Primary | ICD-10-CM

## 2024-04-23 DIAGNOSIS — D62 ACUTE BLOOD LOSS ANEMIA: ICD-10-CM

## 2024-04-23 LAB
ERYTHROCYTE [DISTWIDTH] IN BLOOD BY AUTOMATED COUNT: 17.5 % (ref 11.6–15.1)
HCT VFR BLD AUTO: 25.4 % (ref 36.5–49.3)
HGB BLD-MCNC: 7 G/DL (ref 12–17)
MCH RBC QN AUTO: 20.2 PG (ref 26.8–34.3)
MCHC RBC AUTO-ENTMCNC: 27.6 G/DL (ref 31.4–37.4)
MCV RBC AUTO: 73 FL (ref 82–98)
PLATELET # BLD AUTO: 236 THOUSANDS/UL (ref 149–390)
PMV BLD AUTO: 10 FL (ref 8.9–12.7)
RBC # BLD AUTO: 3.46 MILLION/UL (ref 3.88–5.62)
WBC # BLD AUTO: 5.66 THOUSAND/UL (ref 4.31–10.16)

## 2024-04-23 PROCEDURE — 85027 COMPLETE CBC AUTOMATED: CPT

## 2024-04-23 PROCEDURE — 88341 IMHCHEM/IMCYTCHM EA ADD ANTB: CPT | Performed by: PATHOLOGY

## 2024-04-23 PROCEDURE — 88305 TISSUE EXAM BY PATHOLOGIST: CPT | Performed by: PATHOLOGY

## 2024-04-23 PROCEDURE — 36415 COLL VENOUS BLD VENIPUNCTURE: CPT

## 2024-04-23 PROCEDURE — 88342 IMHCHEM/IMCYTCHM 1ST ANTB: CPT | Performed by: PATHOLOGY

## 2024-04-23 NOTE — PROGRESS NOTES
Chart rvw'd on 4/23/2024    Referring provider-  Dr. Demetrius Ortega    Colonoscopy date-  4/16/2024    Impression-  Fungating and ulcerated mass measuring 4 cm x 5 cm in the ascending colon; performed cold forceps biopsy; tattooed 5 cm distal to the finding  Two Demetria Is polyps measuring 5-9 mm in the transverse colon; performed cold snare removal  Diverticulosis  Ascending colon mass concerning for cancer    Pathology-  A.  Duodenum, biopsy:     - No significant pathologic abnormalities.     - No villous atrophy, increased intraepithelial lymphocytes or crypt hyperplasia to suggest       malabsorptive enteropathy.     - No active inflammation, granulomas, organisms, dysplasia or neoplasia identified.     B.  Stomach, biopsy:     - Chronic inactive antral gastritis.     - No H. pylori organisms identified on H&E stained sections.     - No intestinal metaplasia, dysplasia or neoplasia identified.      C.  Stomach, polyp:     - Fundic gland polyp.     - No intestinal metaplasia, dysplasia or neoplasia identified.     D.  Ascending colon, mass, biopsy:     - Adenocarcinoma.     Comment:  On immunostaining the tumor cells are positive for CDX2 and SATB2 but negative for CK7, CK20, synaptophysin, and NKX3.1.  Coupled with the clinical history, these results favor a colonic primary.  Intradepartmental consultation is in agreement.  Dr. Ortega notified electronically ([employer]) on 4/23/24 at 1030 hours.  Specimen forwarded for MMR testing with the results to be given in an addendum.     E.  Transverse colon, polyps:     - Tubular adenoma, fragments.     - Sessile serrated adenoma, fragments.     - No high grade dysplasia or carcinoma identified.     F.  Sigmoid colon, polyp:     - Tubular adenoma.     - No high grade dysplasia or carcinoma identified.    Labs-  NO CEA  CBC and BMP UTD    Imaging-  Pt NEEDS CT scan set up    Scheduled appointments-  Referral placed for colorectal surgeon- pending scheduling    Surgical  date-  N/A    Post surgical pathology-  N/A

## 2024-04-23 NOTE — PROGRESS NOTES
NN initial phone outreach to the pt, no answer, LVM stating my name title and reason for call. Waiting for call back, will follow up tomorrow if I do not hear back from the pt.

## 2024-04-23 NOTE — RESULT ENCOUNTER NOTE
I called him with his results and left a voicemail to call me back.  Will refer to Oncology and Colorectal Surgery.

## 2024-04-24 ENCOUNTER — PATIENT OUTREACH (OUTPATIENT)
Dept: HEMATOLOGY ONCOLOGY | Facility: CLINIC | Age: 59
End: 2024-04-24

## 2024-04-24 ENCOUNTER — TELEPHONE (OUTPATIENT)
Dept: HEMATOLOGY ONCOLOGY | Facility: CLINIC | Age: 59
End: 2024-04-24

## 2024-04-24 RX ORDER — SODIUM CHLORIDE 9 MG/ML
20 INJECTION, SOLUTION INTRAVENOUS ONCE
Status: CANCELLED | OUTPATIENT
Start: 2024-04-29

## 2024-04-24 NOTE — PROGRESS NOTES
Pt called me back while I was unavailable, call back again to the pt and no answer. Left my contact number for pt to reach me again.

## 2024-04-25 ENCOUNTER — DOCUMENTATION (OUTPATIENT)
Dept: HEMATOLOGY ONCOLOGY | Facility: CLINIC | Age: 59
End: 2024-04-25

## 2024-04-25 ENCOUNTER — PATIENT OUTREACH (OUTPATIENT)
Dept: HEMATOLOGY ONCOLOGY | Facility: CLINIC | Age: 59
End: 2024-04-25

## 2024-04-25 ENCOUNTER — HOSPITAL ENCOUNTER (OUTPATIENT)
Dept: NON INVASIVE DIAGNOSTICS | Facility: HOSPITAL | Age: 59
Discharge: HOME | End: 2024-04-25
Payer: COMMERCIAL

## 2024-04-25 ENCOUNTER — TELEPHONE (OUTPATIENT)
Age: 59
End: 2024-04-25

## 2024-04-25 ENCOUNTER — TELEPHONE (OUTPATIENT)
Dept: CARDIOLOGY CLINIC | Facility: CLINIC | Age: 59
End: 2024-04-25

## 2024-04-25 ENCOUNTER — TELEPHONE (OUTPATIENT)
Dept: HEMATOLOGY ONCOLOGY | Facility: CLINIC | Age: 59
End: 2024-04-25

## 2024-04-25 VITALS
SYSTOLIC BLOOD PRESSURE: 102 MMHG | DIASTOLIC BLOOD PRESSURE: 62 MMHG | BODY MASS INDEX: 31.78 KG/M2 | WEIGHT: 222 LBS | HEART RATE: 90 BPM | HEIGHT: 70 IN

## 2024-04-25 DIAGNOSIS — R06.02 SHORTNESS OF BREATH: ICD-10-CM

## 2024-04-25 DIAGNOSIS — D50.0 IRON DEFICIENCY ANEMIA DUE TO CHRONIC BLOOD LOSS: ICD-10-CM

## 2024-04-25 DIAGNOSIS — I25.10 CORONARY ARTERY DISEASE INVOLVING NATIVE CORONARY ARTERY OF NATIVE HEART WITHOUT ANGINA PECTORIS: ICD-10-CM

## 2024-04-25 DIAGNOSIS — D62 ACUTE BLOOD LOSS ANEMIA: Primary | ICD-10-CM

## 2024-04-25 DIAGNOSIS — R60.0 LOCALIZED EDEMA: ICD-10-CM

## 2024-04-25 LAB
AORTIC ROOT: 3.7 CM
APICAL FOUR CHAMBER EJECTION FRACTION: 64 %
BSA FOR ECHO PROCEDURE: 2.18 M2
FRACTIONAL SHORTENING: 22 (ref 28–44)
INTERVENTRICULAR SEPTUM IN DIASTOLE (PARASTERNAL SHORT AXIS VIEW): 1.2 CM
INTERVENTRICULAR SEPTUM: 1.2 CM (ref 0.6–1.1)
LEFT ATRIUM SIZE: 4.2 CM
LEFT INTERNAL DIMENSION IN SYSTOLE: 3.9 CM (ref 2.1–4)
LEFT VENTRICLE DIASTOLIC VOLUME (MOD BIPLANE): 75 ML
LEFT VENTRICLE DIASTOLIC VOLUME INDEX (MOD BIPLANE): 34.4 ML/M2
LEFT VENTRICLE SYSTOLIC VOLUME (MOD BIPLANE): 24 ML
LEFT VENTRICLE SYSTOLIC VOLUME INDEX (MOD BIPLANE): 11 ML/M2
LEFT VENTRICULAR INTERNAL DIMENSION IN DIASTOLE: 5 CM (ref 3.5–6)
LEFT VENTRICULAR POSTERIOR WALL IN END DIASTOLE: 1.3 CM
LEFT VENTRICULAR STROKE VOLUME: 54 ML
LV EF: 69 %
LVSV (TEICH): 54 ML
SL CV PED ECHO LEFT VENTRICLE DIASTOLIC VOLUME (MOD BIPLANE) 2D: 119 ML
SL CV PED ECHO LEFT VENTRICLE SYSTOLIC VOLUME (MOD BIPLANE) 2D: 65 ML
TR MAX PG: 38 MMHG
TR PEAK VELOCITY: 3.1 M/S
TRICUSPID VALVE PEAK REGURGITATION VELOCITY: 3.1 M/S

## 2024-04-25 PROCEDURE — 93308 TTE F-UP OR LMTD: CPT

## 2024-04-25 PROCEDURE — 93321 DOPPLER ECHO F-UP/LMTD STD: CPT

## 2024-04-25 PROCEDURE — 93321 DOPPLER ECHO F-UP/LMTD STD: CPT | Performed by: INTERNAL MEDICINE

## 2024-04-25 PROCEDURE — 93325 DOPPLER ECHO COLOR FLOW MAPG: CPT | Performed by: INTERNAL MEDICINE

## 2024-04-25 PROCEDURE — 93308 TTE F-UP OR LMTD: CPT | Performed by: INTERNAL MEDICINE

## 2024-04-25 PROCEDURE — 93325 DOPPLER ECHO COLOR FLOW MAPG: CPT

## 2024-04-25 NOTE — TELEPHONE ENCOUNTER
Spoke with Brent about his echo results which are unchanged from 12/2023.  He is set up for a blood transfusion on Monday of next week. He has repeat labs and CT next week then colorectal surgery visit 5/7.   I will monitor his lab results next week and follow up with him regarding symptoms after getting the blood transfusion.  He is aware to call with any concerns and seek attention at the ER if any worsening symptoms.

## 2024-04-25 NOTE — TELEPHONE ENCOUNTER
Pt's wife Destiny called to schedule pt for an appointment with Dr Carreon. Call was transferred to Breann to assist with scheduling.

## 2024-04-25 NOTE — TELEPHONE ENCOUNTER
Rec'd return call from pt. Reviewed hgb 7.0. Pt denies any bleeding currently. Pt does feel more fatigued, unable to do many of his regular activities due to fatigue. Endorses some SOB. Reviewed options of blood transfusion tomorrow possibly at [facility]. Pt says he is unable to get to the lab today for t&s. Advised pt he could also report to the ER for a transfusion if needed. Pt requesting for transfusion to be set up early next week, he will go to [facility] at some point between tomorrow and Sunday to have t&s and repeat cbc drawn. He is aware that if hgb is below 7 he will get a call from the on call provider. Appt 5/28 with Rochelle changed to consult with Dr Almaraz due to new colon cancer diagnosis. Pt aware he will be put on wait list in case a sooner appt becomes available. He voiced understanding and agreement to plan.

## 2024-04-25 NOTE — TELEPHONE ENCOUNTER
Call Transfer   Who are you speaking with?  Patient   If it is not the patient, are they listed on an active communication consent form? N/A   Who is the patients HemOnc/SurgOnc provider? NEVAEH Mclain   What is the reason for this call? Pt is calling in regards to a call he missed yesterday from Rochelle's office. Chart notes state that Maria Luisa called pt yesterday.   Person/Department that the call was transferred to?    Time that call was transferred?    Maria Luisa Tejada  9:09 AM   Your call will be transferred now. If you receive a voicemail, please leave a detailed message and a member of the team will return your call as soon as possible.    Did you relay this information to the caller?  Yes

## 2024-04-25 NOTE — PROGRESS NOTES
Incoming phone call rcvd from the pt's wife this morning. I introduced myself and explained my role to her. We rvwd the pt's new diagnosis of colon cancer and the next steps needed as part of his workup including blood work (CEA, CMP), CT scan w oral and IV contrast (pt does not have any allergies), as well as an appt w the colorectal surgeon. We discussed that if the CT scan shows no signs of spread that surgery is usually the recommended upfront treatment for colon cancer. If there is spread and this is advanced disease than the pt would have upfront chemo, unless he has obstructive symptoms. At this time, per his wife, the pt does not have N/V, no c/o abdominal pain or distention.   She reports the pt having a low hgb and there was conversation to set up the pt for iron infusions w Rochelle Triana. He has a follow up appt w her end of May. As of now the pt's most recent CBC shows a hgb level of 7.0. I will be updating Rochelle of the CBC counts and possible need for pt to have blood transfusion and/or iron infusion. The pt does not want to get a blood transfusion, does not favor the idea of receiving someone else's blood but his wife said she will talk to him further about the importance of him getting transfused.   Pt had a heart attack about 6 months ago and had prior colonoscopy about 10 yrs ago. His father had bladder cancer and younger brother found to have polyps. We talked about the importance of their children getting screened and her as well. Patient has not had genetic testing.     I let her know that I would set up the CT scan and appt w CRS and call her back w the appt details. She is open to travel. They reside in [place], closest campuses are [facility].     She took my contact info in case she has additional questions/concerns.         Call made to the pt's wife at 1235PM, no answer, LM that I was calling to review all the appts that were made today for Brent. Left my contact number and waiting  to hear back.      Call back rcvd from [place], we rvwd the upcoming sched appts for his blood transfusion on Monday, w Dr. Carreon on 5/7 and all details provided to her for sched CT scan on 5/1/2024 @0830 (instructed pt to arrive about 10-15 min sooner), location is [place] and address given, prep instructions rvwd w wife and could hear her typing up all info I was giving to her. She knows that pt will need to go for bloodwork and plan is when he goes for his ECHO he will have his blood drawn as well as  the two contrast bottles from the hospital. She was very appreciative and knows she can call me to ask any questions prior to his consult. Will look out for CT scan results to ensure appts as set up remain appropriate.

## 2024-04-27 ENCOUNTER — LAB (OUTPATIENT)
Dept: LAB | Facility: HOSPITAL | Age: 59
End: 2024-04-27
Payer: COMMERCIAL

## 2024-04-27 DIAGNOSIS — C18.2 PRIMARY ADENOCARCINOMA OF ASCENDING COLON (HCC): ICD-10-CM

## 2024-04-27 DIAGNOSIS — D50.0 IRON DEFICIENCY ANEMIA DUE TO CHRONIC BLOOD LOSS: ICD-10-CM

## 2024-04-27 DIAGNOSIS — D62 ACUTE BLOOD LOSS ANEMIA: ICD-10-CM

## 2024-04-27 LAB
ABO GROUP BLD: NORMAL
ALBUMIN SERPL BCP-MCNC: 4.4 G/DL (ref 3.5–5)
ALP SERPL-CCNC: 69 U/L (ref 34–104)
ALT SERPL W P-5'-P-CCNC: 15 U/L (ref 7–52)
ANION GAP SERPL CALCULATED.3IONS-SCNC: 10 MMOL/L (ref 4–13)
AST SERPL W P-5'-P-CCNC: 15 U/L (ref 13–39)
BASOPHILS # BLD AUTO: 0.04 THOUSANDS/ÂΜL (ref 0–0.1)
BASOPHILS NFR BLD AUTO: 1 % (ref 0–1)
BILIRUB SERPL-MCNC: 0.73 MG/DL (ref 0.2–1)
BLD GP AB SCN SERPL QL: NEGATIVE
BUN SERPL-MCNC: 19 MG/DL (ref 5–25)
CALCIUM SERPL-MCNC: 9.3 MG/DL (ref 8.4–10.2)
CEA SERPL-MCNC: 4.8 NG/ML (ref 0–3)
CHLORIDE SERPL-SCNC: 101 MMOL/L (ref 96–108)
CO2 SERPL-SCNC: 25 MMOL/L (ref 21–32)
CREAT SERPL-MCNC: 1.19 MG/DL (ref 0.6–1.3)
EOSINOPHIL # BLD AUTO: 0.06 THOUSAND/ÂΜL (ref 0–0.61)
EOSINOPHIL NFR BLD AUTO: 1 % (ref 0–6)
ERYTHROCYTE [DISTWIDTH] IN BLOOD BY AUTOMATED COUNT: 17.9 % (ref 11.6–15.1)
GFR SERPL CREATININE-BSD FRML MDRD: 66 ML/MIN/1.73SQ M
GLUCOSE SERPL-MCNC: 154 MG/DL (ref 65–140)
HCT VFR BLD AUTO: 27.8 % (ref 36.5–49.3)
HGB BLD-MCNC: 7.6 G/DL (ref 12–17)
IMM GRANULOCYTES # BLD AUTO: 0.02 THOUSAND/UL (ref 0–0.2)
IMM GRANULOCYTES NFR BLD AUTO: 0 % (ref 0–2)
LYMPHOCYTES # BLD AUTO: 1.07 THOUSANDS/ÂΜL (ref 0.6–4.47)
LYMPHOCYTES NFR BLD AUTO: 18 % (ref 14–44)
MCH RBC QN AUTO: 19.9 PG (ref 26.8–34.3)
MCHC RBC AUTO-ENTMCNC: 27.3 G/DL (ref 31.4–37.4)
MCV RBC AUTO: 73 FL (ref 82–98)
MONOCYTES # BLD AUTO: 0.53 THOUSAND/ÂΜL (ref 0.17–1.22)
MONOCYTES NFR BLD AUTO: 9 % (ref 4–12)
NEUTROPHILS # BLD AUTO: 4.26 THOUSANDS/ÂΜL (ref 1.85–7.62)
NEUTS SEG NFR BLD AUTO: 71 % (ref 43–75)
NRBC BLD AUTO-RTO: 0 /100 WBCS
PLATELET # BLD AUTO: 275 THOUSANDS/UL (ref 149–390)
PMV BLD AUTO: 9.3 FL (ref 8.9–12.7)
POTASSIUM SERPL-SCNC: 3.9 MMOL/L (ref 3.5–5.3)
PROT SERPL-MCNC: 6.6 G/DL (ref 6.4–8.4)
RBC # BLD AUTO: 3.82 MILLION/UL (ref 3.88–5.62)
RH BLD: POSITIVE
SODIUM SERPL-SCNC: 136 MMOL/L (ref 135–147)
SPECIMEN EXPIRATION DATE: NORMAL
WBC # BLD AUTO: 5.98 THOUSAND/UL (ref 4.31–10.16)

## 2024-04-27 PROCEDURE — 80053 COMPREHEN METABOLIC PANEL: CPT

## 2024-04-27 PROCEDURE — 86900 BLOOD TYPING SEROLOGIC ABO: CPT

## 2024-04-27 PROCEDURE — 86850 RBC ANTIBODY SCREEN: CPT

## 2024-04-27 PROCEDURE — 82378 CARCINOEMBRYONIC ANTIGEN: CPT

## 2024-04-27 PROCEDURE — 36415 COLL VENOUS BLD VENIPUNCTURE: CPT

## 2024-04-27 PROCEDURE — 85025 COMPLETE CBC W/AUTO DIFF WBC: CPT

## 2024-04-27 PROCEDURE — 86901 BLOOD TYPING SEROLOGIC RH(D): CPT

## 2024-04-29 ENCOUNTER — HOSPITAL ENCOUNTER (OUTPATIENT)
Dept: INFUSION CENTER | Facility: HOSPITAL | Age: 59
Discharge: HOME | End: 2024-04-29
Payer: COMMERCIAL

## 2024-04-29 ENCOUNTER — TELEPHONE (OUTPATIENT)
Dept: HEMATOLOGY ONCOLOGY | Facility: CLINIC | Age: 59
End: 2024-04-29

## 2024-04-29 VITALS
DIASTOLIC BLOOD PRESSURE: 64 MMHG | RESPIRATION RATE: 16 BRPM | TEMPERATURE: 97 F | OXYGEN SATURATION: 96 % | SYSTOLIC BLOOD PRESSURE: 117 MMHG | HEART RATE: 85 BPM

## 2024-04-29 DIAGNOSIS — D62 ACUTE BLOOD LOSS ANEMIA: Primary | ICD-10-CM

## 2024-04-29 LAB
ABO GROUP BLD: NORMAL
RH BLD: POSITIVE

## 2024-04-29 PROCEDURE — P9016 RBC LEUKOCYTES REDUCED: HCPCS

## 2024-04-29 PROCEDURE — 86920 COMPATIBILITY TEST SPIN: CPT

## 2024-04-29 PROCEDURE — 36430 TRANSFUSION BLD/BLD COMPNT: CPT

## 2024-04-29 RX ORDER — SODIUM CHLORIDE 9 MG/ML
20 INJECTION, SOLUTION INTRAVENOUS ONCE
Status: COMPLETED | OUTPATIENT
Start: 2024-04-29 | End: 2024-04-29

## 2024-04-29 RX ORDER — SODIUM CHLORIDE 9 MG/ML
20 INJECTION, SOLUTION INTRAVENOUS ONCE
OUTPATIENT
Start: 2024-04-29

## 2024-04-29 RX ADMIN — SODIUM CHLORIDE 20 ML/HR: 0.9 INJECTION, SOLUTION INTRAVENOUS at 13:00

## 2024-04-29 NOTE — TELEPHONE ENCOUNTER
Called and spoke with patient and his wife to obtain blood consent.  I reviewed indications and adverse reactions including fever, chills, infection.  Reviewed current hemoglobin is 7.6.  We discussed holding off on transfusion given hemoglobin has improved however patient continues to be symptomatic with shortness of breath and fatigue.  He would like to proceed with transfusion today.  He gave verbal consent.

## 2024-04-29 NOTE — PLAN OF CARE
Problem: Potential for Falls  Goal: Patient will remain free of falls  Description: INTERVENTIONS:  - Educate patient/family on patient safety including physical limitations  - Instruct patient to call for assistance with activity   - Consult OT/PT to assist with strengthening/mobility   - Keep Call bell within reach  - Keep bed low and locked with side rails adjusted as appropriate  - Keep care items and personal belongings within reach  - Initiate and maintain comfort rounds  - Make Fall Risk Sign visible to staff  - Consider moving patient to room near nurses station  Outcome: Progressing     Problem: INFECTION - ADULT  Goal: Absence or prevention of progression during hospitalization  Description: INTERVENTIONS:  - Assess and monitor for signs and symptoms of infection  - Monitor lab/diagnostic results  - Monitor all insertion sites, i.e. indwelling lines, tubes, and drains  - Monitor endotracheal if appropriate and nasal secretions for changes in amount and color  - [place] appropriate cooling/warming therapies per order  - Administer medications as ordered  - Instruct and encourage patient and family to use good hand hygiene technique  - Identify and instruct in appropriate isolation precautions for identified infection/condition  Outcome: Progressing     Problem: Knowledge Deficit  Goal: Patient/family/caregiver demonstrates understanding of disease process, treatment plan, medications, and discharge instructions  Description: Complete learning assessment and assess knowledge base.  Interventions:  - Provide teaching at level of understanding  - Provide teaching via preferred learning methods  Outcome: Progressing

## 2024-04-29 NOTE — PROGRESS NOTES
Pt tolerated 1 unit PRBCs well with no s/s of reaction. PIV removed without complications.     Brent Hall does not need any f/u with infusion at this time.    AVS declined by Brent Hall.    Pt discharged off unit in stable condition accompanied by wife.

## 2024-04-30 LAB
ABO GROUP BLD BPU: NORMAL
BPU ID: NORMAL
CROSSMATCH: NORMAL
UNIT DISPENSE STATUS: NORMAL
UNIT PRODUCT CODE: NORMAL
UNIT PRODUCT VOLUME: 350 ML
UNIT RH: NORMAL

## 2024-05-01 ENCOUNTER — HOSPITAL ENCOUNTER (OUTPATIENT)
Dept: CT IMAGING | Facility: HOSPITAL | Age: 59
Discharge: HOME | End: 2024-05-01
Payer: COMMERCIAL

## 2024-05-01 DIAGNOSIS — C18.2 PRIMARY ADENOCARCINOMA OF ASCENDING COLON (HCC): ICD-10-CM

## 2024-05-01 PROCEDURE — 71260 CT THORAX DX C+: CPT

## 2024-05-01 PROCEDURE — 74177 CT ABD & PELVIS W/CONTRAST: CPT

## 2024-05-01 RX ADMIN — IOHEXOL 100 ML: 350 INJECTION, SOLUTION INTRAVENOUS at 08:28

## 2024-05-03 ENCOUNTER — PATIENT MESSAGE (OUTPATIENT)
Dept: CARDIOLOGY CLINIC | Facility: CLINIC | Age: 59
End: 2024-05-03

## 2024-05-03 ENCOUNTER — DOCUMENTATION (OUTPATIENT)
Dept: HEMATOLOGY ONCOLOGY | Facility: CLINIC | Age: 59
End: 2024-05-03

## 2024-05-03 DIAGNOSIS — R60.0 LOCALIZED EDEMA: ICD-10-CM

## 2024-05-03 DIAGNOSIS — D62 ACUTE BLOOD LOSS ANEMIA: Primary | ICD-10-CM

## 2024-05-03 NOTE — PROGRESS NOTES
Call made to the reading room to get pt's CT scan read before his upcoming appt w the CRS early next wk, spoke to Flor.

## 2024-05-03 NOTE — PROGRESS NOTES
Colon and Rectal Surgery   Brent Hall 58 y.o. male MRN: [id number]   Encounter: [id number]  05/10/24   11:05 AM        ASSESSMENT:    Brent is a 58-year-old male, presents to office for new diagnosis of a ascending colon adenocarcinoma, colon cancer.  Family hx polyps, brother.    CEA is elevated at 4.8.  He has history of anemia, blood transfusions were required, coronary disease, cardiac catheterization 12/2023.  A1c shows uncontrolled diabetes at 14.  He is currently on insulin for ongoing cares with this.  Reviewed CT scan not showing any obvious liver disease, multiple tiny lung nodules that will require early repeat follow-up.  For CT chest.    We discussed his multiple high risks, discussed a approximately 6-week lead up to surgery to include iron infusions, surgical optimization, and any ongoing modification/optimization of his diabetes, cardiology clearance.    We discussed robotic, possible laparoscopic versus open right hemicolectomy,in a face-to-face, personal, informed consent process, the benefits, alternatives, risks including not limited to bleeding, infection, risks of anesthesia, open surgery, DVT/PE, heart attack, stroke, death, damage to local structures(e.g. ureter, duodenum),anastomotic leak requiring reoperation, temporary versus permanent stoma. They understood these risks, signed informed consent, and wish to proceed.    PLAN:  -Robotic, possible laparoscopic versus open right hemicolectomy 6/24/24 planned, consented  -Iron infusions  -Cardiology clearance requested  -Surgical Optimization requested  CC:  MD Dr. Jordan Estevez Dr., Dr., JUAN Caceres  GI Tumor Conference      HPI  Brent Hall is a 58 y.o. male referred for evaluation today by Dr. Demetrius Ortega for adenocarcinoma.     His colonoscopy performed on 04/16/2024 by Dr. Ortega revealed: A fungating and ulcerated mass measuring 4 cm x 5 cm in the ascending colon, two Demetria Is polyps  measuring 5-9 mm in the transverse colon, and pancolonic diverticula.   Final Diagnosis:   D.  Ascending colon, mass, biopsy:     - Adenocarcinoma.  Comment:  On immunostaining the tumor cells are positive for CDX2 and SATB2 but negative for CK7, CK20, synaptophysin, and NKX3.1.  Coupled with the clinical history, these results favor a colonic primary.  Intradepartmental consultation is in agreement.  Dr. Ortega notified electronically ([employer]) on 4/23/24 at 1030 hours.  Specimen forwarded for MMR testing with the results to be given in an addendum.  E.  Transverse colon, polyps:     - Tubular adenoma, fragments.     - Sessile serrated adenoma, fragments.     - No high grade dysplasia or carcinoma identified.  F.  Sigmoid colon, polyp:     - Tubular adenoma.     - No high grade dysplasia or carcinoma identified.    CT chest abdomen pelvis with contrast 5/1/24:    1. 4.9 cm mass in the ascending colon.  2. Numerous pulmonary nodules measuring 3 to 5 mm in size. These are nonspecific and given clinical history, consider short-term 3-month follow-up CT scan.             Lab Results   Component Value Date    CEA 4.8 (H) 04/27/2024     Lab Results   Component Value Date    WBC 5.51 05/08/2024    HGB 8.2 (L) 05/08/2024    HCT 29.1 (L) 05/08/2024    MCV 72 (L) 05/08/2024     05/08/2024     Lab Results   Component Value Date    SODIUM 136 04/27/2024    K 3.9 04/27/2024     04/27/2024    CO2 25 04/27/2024    AGAP 10 04/27/2024    BUN 19 04/27/2024    CREATININE 1.19 04/27/2024    GLUC 154 (H) 04/27/2024    CALCIUM 9.3 04/27/2024    AST 15 04/27/2024    ALT 15 04/27/2024    ALKPHOS 69 04/27/2024    TP 6.6 04/27/2024    TBILI 0.73 04/27/2024    EGFR 66 04/27/2024       Historical Information   Past Medical History:   Diagnosis Date    Anemia     CAD (coronary artery disease)     Diabetes mellitus (HCC)     Hypertension     Obesity (BMI 30-39.9)      Past Surgical History:   Procedure Laterality Date     CARDIAC CATHETERIZATION Left 12/11/2023    Procedure: Cardiac Left Heart Cath;  Surgeon: Nacho Salinas DO;  Location: [facility];  Service: Cardiology    CARDIAC CATHETERIZATION N/A 12/11/2023    Procedure: Cardiac Coronary Angiogram;  Surgeon: Nacho Salinas DO;  Location: [facility];  Service: Cardiology    CARDIAC CATHETERIZATION  12/11/2023    Procedure: Cardiac catheterization;  Surgeon: Nacho Salinas DO;  Location: [facility];  Service: Cardiology    ULNAR NERVE REPAIR Left 2018    WRIST ARTHRODESIS Bilateral     2019, 2014       Meds/Allergies       Current Outpatient Medications:     atorvastatin (LIPITOR) 80 mg tablet, Take 1 tablet (80 mg total) by mouth daily with dinner, Disp: 90 tablet, Rfl: 3    Fiasp FlexTouch 100 units/mL injection pen, E11.65 Inject 10 units with meals plus scale. TDD 50 units, Disp: 45 mL, Rfl: 3    furosemide (LASIX) 20 mg tablet, Take 1 tablet (20 mg total) by mouth daily, Disp: 30 tablet, Rfl: 11    Lantus SoloStar 100 units/mL SOPN, Inject 34 Units under the skin daily at bedtime, Disp: , Rfl:     metoprolol succinate (TOPROL-XL) 25 mg 24 hr tablet, Take 1 tablet (25 mg total) by mouth daily, Disp: 90 tablet, Rfl: 3    [START ON 6/3/2024] metroNIDAZOLE (FLAGYL) 500 mg tablet, 1 PM AND 10PM Do not start before Ananya 3, 2024., Disp: 2 tablet, Rfl: 0    [START ON 6/3/2024] neomycin (MYCIFRADIN) 500 mg tablet, 1pm and 10pm Do not start before Ananya 3, 2024., Disp: 2 tablet, Rfl: 0    pantoprazole (PROTONIX) 40 mg tablet, TAKE 1 TABLET BY MOUTH TWICE A DAY, Disp: 60 tablet, Rfl: 5    Alcohol Swabs 70 % PADS, May substitute brand based on insurance coverage. Check glucose ACHS., Disp: 200 each, Rfl: 0    Blood Glucose Monitoring Suppl (OneTouch Verio Reflect) w/Device KIT, May substitute brand based on insurance coverage. Check glucose ACHS., Disp: 1 kit, Rfl: 0    clopidogrel (Plavix) 75 mg tablet, Take 1 tablet (75 mg total) by mouth  daily (Patient not taking: Reported on 2024), Disp: 30 tablet, Rfl: 11    ferrous sulfate 324 (65 Fe) mg, Take 1 tablet (324 mg total) by mouth daily before breakfast (Patient not taking: Reported on 2024), Disp: 30 tablet, Rfl: 11    glucose blood (OneTouch Verio) test strip, Check blood sugars 4 times daily, Disp: 200 each, Rfl: 5    Insulin Pen Needle (BD Pen Needle Andreia 2nd Gen) 32G X 4 MM MISC, 4 times daily insulin, Disp: 400 each, Rfl: 1    Insulin Pen Needle (BD Pen Needle Andreia 2nd Gen) 32G X 4 MM MISC, For use with insulin pen. Pharmacy may dispense brand covered by insurance., Disp: 100 each, Rfl: 0    nitroglycerin (NITROSTAT) 0.4 mg SL tablet, Place 1 tablet (0.4 mg total) under the tongue every 5 (five) minutes as needed for chest pain, Disp: 30 tablet, Rfl: 0    OneTouch Delica Lancets 33G MISC, May substitute brand based on insurance coverage. Check glucose ACHS., Disp: 200 each, Rfl: 0      No Known Allergies      Social History   Social History     Substance and Sexual Activity   Alcohol Use Not Currently    Comment: very rare     Social History     Substance and Sexual Activity   Drug Use Never     Social History     Tobacco Use   Smoking Status Former    Current packs/day: 0.00    Types: Cigarettes    Quit date: 2010    Years since quittin.3   Smokeless Tobacco Never         Family History:   Family History   Problem Relation Age of Onset    Diabetes Mother     Diabetes Father     Valvular heart disease Father     Atrial fibrillation Father     Diabetes Sister     Lung disease Sister     Heart failure Sister        Review of Systems   Constitutional: Negative.    HENT: Negative.     Eyes: Negative.    Respiratory: Negative.     Cardiovascular: Negative.    Gastrointestinal: Negative.    Endocrine: Negative.    Genitourinary: Negative.    Musculoskeletal: Negative.    Skin: Negative.    Allergic/Immunologic: Negative.    Neurological: Negative.    Hematological: Negative.   "  Psychiatric/Behavioral: Negative.         Objective     Current Vitals:   Vitals:    05/07/24 0846   Weight: 98 kg (216 lb)   Height: 5' 10\" (1.778 m)       Physical Exam:  General:no distress  Neck:supple  Pulm:no increased work of breathing, clear bilateral  CV:sinus  Abdomen:soft,nontender  Extremities:no edema        "

## 2024-05-04 NOTE — RESULT ENCOUNTER NOTE
CT shows 4.9cm mass in ascending colon and multiple tiny lung nodules. It's unclear if the lung nodules are significant. He will be meeting with Dr. Carreon and Dr. Almaraz to discuss further evaluation and management. This was communicated via [employer].

## 2024-05-07 ENCOUNTER — TELEPHONE (OUTPATIENT)
Dept: HEMATOLOGY ONCOLOGY | Facility: CLINIC | Age: 59
End: 2024-05-07

## 2024-05-07 ENCOUNTER — PATIENT OUTREACH (OUTPATIENT)
Dept: HEMATOLOGY ONCOLOGY | Facility: CLINIC | Age: 59
End: 2024-05-07

## 2024-05-07 ENCOUNTER — OFFICE VISIT (OUTPATIENT)
Age: 59
End: 2024-05-07
Payer: COMMERCIAL

## 2024-05-07 VITALS — HEIGHT: 70 IN | BODY MASS INDEX: 30.92 KG/M2 | WEIGHT: 216 LBS

## 2024-05-07 DIAGNOSIS — D62 ACUTE BLOOD LOSS ANEMIA: ICD-10-CM

## 2024-05-07 DIAGNOSIS — D50.0 IRON DEFICIENCY ANEMIA DUE TO CHRONIC BLOOD LOSS: Primary | ICD-10-CM

## 2024-05-07 DIAGNOSIS — C18.2 PRIMARY ADENOCARCINOMA OF ASCENDING COLON (HCC): Primary | ICD-10-CM

## 2024-05-07 DIAGNOSIS — C18.9 ADENOCARCINOMA OF COLON (HCC): ICD-10-CM

## 2024-05-07 PROCEDURE — 99205 OFFICE O/P NEW HI 60 MIN: CPT | Performed by: COLON & RECTAL SURGERY

## 2024-05-07 RX ORDER — HEPARIN SODIUM 5000 [USP'U]/ML
5000 INJECTION, SOLUTION INTRAVENOUS; SUBCUTANEOUS ONCE
OUTPATIENT
Start: 2024-05-07 | End: 2024-05-07

## 2024-05-07 RX ORDER — METRONIDAZOLE 500 MG/1
TABLET ORAL
Qty: 2 TABLET | Refills: 0 | Status: SHIPPED | OUTPATIENT
Start: 2024-06-03 | End: 2024-06-04

## 2024-05-07 RX ORDER — BISACODYL 5 MG/1
10 TABLET, DELAYED RELEASE ORAL 2 TIMES DAILY
OUTPATIENT
Start: 2024-05-07 | End: 2024-05-08

## 2024-05-07 RX ORDER — POLYETHYLENE GLYCOL 3350 17 G/17G
255 POWDER, FOR SOLUTION ORAL ONCE
OUTPATIENT
Start: 2024-05-07 | End: 2024-05-07

## 2024-05-07 RX ORDER — NEOMYCIN SULFATE 500 MG/1
TABLET ORAL
Qty: 2 TABLET | Refills: 0 | Status: SHIPPED | OUTPATIENT
Start: 2024-06-03 | End: 2024-05-17

## 2024-05-07 RX ORDER — ACETAMINOPHEN 325 MG/1
975 TABLET ORAL ONCE
OUTPATIENT
Start: 2024-05-07 | End: 2024-05-07

## 2024-05-07 NOTE — TELEPHONE ENCOUNTER
Called and left message that patient will likely need iron infusions prior to surgery.  He needs updated blood work.  Instructed to call back through the HOPE line [phone] or message through [employer] with a good time for me to call him back to review iron infusions indications and adverse reactions.  We will plan to schedule this soon as possible in order to optimize him for surgery.    ----- Message from Flako Hebert DO sent at 5/7/2024  9:07 AM EDT -----  Regarding: Anemia and upcoming Colon Resection  Good morning Rochelle.    Reaching out about a mutual patient, Mr Hall.  He's seen Dr Carreon and planning a colectomy in the next 6 weeks or so.  As you noted in your assessment, he's anemic with multifactorial nutritional deficiencies.  Wondering what the status of coordinating parenteral iron is?   Happy to help coordinate if needed.     As you know, surgical outcomes are much better when iron deficiency is treated and anemia improves.    Thank you for your help!  Flako Hebert DO

## 2024-05-07 NOTE — PROGRESS NOTES
NN phone outreach, no answer, LVM, stated that I cancelled the pt's appt w Dr. Almaraz as sched on 5/28. The plan is for the pt to have surgery and CRS will re-eval if pt will need med onc post-operatively.  I left my contact info if the pt has further questions/concerns.

## 2024-05-08 ENCOUNTER — APPOINTMENT (OUTPATIENT)
Dept: LAB | Facility: HOSPITAL | Age: 59
End: 2024-05-08
Payer: COMMERCIAL

## 2024-05-08 DIAGNOSIS — D62 ACUTE BLOOD LOSS ANEMIA: ICD-10-CM

## 2024-05-08 DIAGNOSIS — D50.0 IRON DEFICIENCY ANEMIA DUE TO CHRONIC BLOOD LOSS: ICD-10-CM

## 2024-05-08 DIAGNOSIS — C18.9 ADENOCARCINOMA OF COLON (HCC): ICD-10-CM

## 2024-05-08 LAB
BASOPHILS # BLD AUTO: 0.02 THOUSANDS/ÂΜL (ref 0–0.1)
BASOPHILS NFR BLD AUTO: 0 % (ref 0–1)
EOSINOPHIL # BLD AUTO: 0.08 THOUSAND/ÂΜL (ref 0–0.61)
EOSINOPHIL NFR BLD AUTO: 2 % (ref 0–6)
ERYTHROCYTE [DISTWIDTH] IN BLOOD BY AUTOMATED COUNT: 19.5 % (ref 11.6–15.1)
FERRITIN SERPL-MCNC: 5 NG/ML (ref 24–336)
HCT VFR BLD AUTO: 29.1 % (ref 36.5–49.3)
HGB BLD-MCNC: 8.2 G/DL (ref 12–17)
IMM GRANULOCYTES # BLD AUTO: 0.02 THOUSAND/UL (ref 0–0.2)
IMM GRANULOCYTES NFR BLD AUTO: 0 % (ref 0–2)
IRON SATN MFR SERPL: 5 % (ref 15–50)
IRON SERPL-MCNC: 32 UG/DL (ref 50–212)
LYMPHOCYTES # BLD AUTO: 1.32 THOUSANDS/ÂΜL (ref 0.6–4.47)
LYMPHOCYTES NFR BLD AUTO: 24 % (ref 14–44)
MCH RBC QN AUTO: 20.3 PG (ref 26.8–34.3)
MCHC RBC AUTO-ENTMCNC: 28.2 G/DL (ref 31.4–37.4)
MCV RBC AUTO: 72 FL (ref 82–98)
MONOCYTES # BLD AUTO: 0.68 THOUSAND/ÂΜL (ref 0.17–1.22)
MONOCYTES NFR BLD AUTO: 12 % (ref 4–12)
NEUTROPHILS # BLD AUTO: 3.39 THOUSANDS/ÂΜL (ref 1.85–7.62)
NEUTS SEG NFR BLD AUTO: 62 % (ref 43–75)
NRBC BLD AUTO-RTO: 0 /100 WBCS
PLATELET # BLD AUTO: 248 THOUSANDS/UL (ref 149–390)
PMV BLD AUTO: 9.9 FL (ref 8.9–12.7)
RBC # BLD AUTO: 4.03 MILLION/UL (ref 3.88–5.62)
TIBC SERPL-MCNC: 663 UG/DL (ref 250–450)
UIBC SERPL-MCNC: 631 UG/DL (ref 155–355)
WBC # BLD AUTO: 5.51 THOUSAND/UL (ref 4.31–10.16)

## 2024-05-08 PROCEDURE — 83540 ASSAY OF IRON: CPT

## 2024-05-08 PROCEDURE — 83550 IRON BINDING TEST: CPT

## 2024-05-08 PROCEDURE — 82728 ASSAY OF FERRITIN: CPT

## 2024-05-08 PROCEDURE — 36415 COLL VENOUS BLD VENIPUNCTURE: CPT

## 2024-05-08 PROCEDURE — 85025 COMPLETE CBC W/AUTO DIFF WBC: CPT

## 2024-05-09 ENCOUNTER — TELEPHONE (OUTPATIENT)
Dept: HEMATOLOGY ONCOLOGY | Facility: CLINIC | Age: 59
End: 2024-05-09

## 2024-05-09 DIAGNOSIS — D50.0 IRON DEFICIENCY ANEMIA DUE TO CHRONIC BLOOD LOSS: ICD-10-CM

## 2024-05-09 DIAGNOSIS — C80.1 ADENOCARCINOMA (HCC): ICD-10-CM

## 2024-05-09 DIAGNOSIS — D62 ACUTE BLOOD LOSS ANEMIA: Primary | ICD-10-CM

## 2024-05-09 DIAGNOSIS — C18.9 ADENOCARCINOMA OF COLON (HCC): ICD-10-CM

## 2024-05-09 RX ORDER — SODIUM CHLORIDE 9 MG/ML
20 INJECTION, SOLUTION INTRAVENOUS ONCE
OUTPATIENT
Start: 2024-05-20

## 2024-05-09 NOTE — TELEPHONE ENCOUNTER
Called and spoke to patient regarding need for iron infusions preop.  Iron saturation is 5% with a ferritin level of 5 on 5/8/2024.  Hemoglobin is 8.2.  Patient is undergoing surgery for colon cancer on 6/24/2024.  Anesthesia reached out to our practice for optimization with iron infusions preop. I reviewed indications and adverse reactions including shortness of breath, chest heaviness, muscle cramping, headache, itching, Anaphylaxis/allergic reaction, arthralgias/myalgias, nausea; agrees to proceed with treatment. Patient is advised to contact our office if they has any of the symptoms.  We will plan for this to start within the next week or 2 in order to optimize patient for surgery.  Patient verbalized understanding and agrees with the plan.

## 2024-05-09 NOTE — TELEPHONE ENCOUNTER
Called and left message with date and time of appt made in minors. Left number to call back if they need to reschedule.

## 2024-05-10 NOTE — PATIENT INSTRUCTIONS
ASSESSMENT:    Brent is a 58-year-old male, presents to office for new diagnosis of a ascending colon adenocarcinoma, colon cancer.  Family hx polyps, brother.    CEA is elevated at 4.8.  He has history of anemia, blood transfusions were required, coronary disease, cardiac catheterization 12/2023.  A1c shows uncontrolled diabetes at 14.  He is currently on insulin for ongoing cares with this.  Reviewed CT scan not showing any obvious liver disease, multiple tiny lung nodules that will require early repeat follow-up.  For CT chest.    We discussed his multiple high risks, discussed a approximately 6-week lead up to surgery to include iron infusions, surgical optimization, and any ongoing modification/optimization of his diabetes, cardiology clearance.    We discussed robotic, possible laparoscopic versus open right hemicolectomy,in a face-to-face, personal, informed consent process, the benefits, alternatives, risks including not limited to bleeding, infection, risks of anesthesia, open surgery, DVT/PE, heart attack, stroke, death, damage to local structures(e.g. ureter, duodenum),anastomotic leak requiring reoperation, temporary versus permanent stoma. They understood these risks, signed informed consent, and wish to proceed.    PLAN:  -Robotic, possible laparoscopic versus open right hemicolectomy 6/24/24 planned, consented  -Iron infusions  -Cardiology clearance requested  -Surgical Optimization requested  CC:  MD Dr. Jordan Estevez Dr., Dr., RN  Hortencia Caceres  GI Tumor Conference  
absent

## 2024-05-14 DIAGNOSIS — C18.2 PRIMARY ADENOCARCINOMA OF ASCENDING COLON (HCC): ICD-10-CM

## 2024-05-16 ENCOUNTER — APPOINTMENT (OUTPATIENT)
Dept: LAB | Facility: HOSPITAL | Age: 59
End: 2024-05-16
Payer: COMMERCIAL

## 2024-05-16 ENCOUNTER — OFFICE VISIT (OUTPATIENT)
Dept: ENDOCRINOLOGY | Facility: CLINIC | Age: 59
End: 2024-05-16
Payer: COMMERCIAL

## 2024-05-16 VITALS
OXYGEN SATURATION: 98 % | HEART RATE: 90 BPM | BODY MASS INDEX: 31.64 KG/M2 | HEIGHT: 70 IN | SYSTOLIC BLOOD PRESSURE: 138 MMHG | WEIGHT: 221 LBS | DIASTOLIC BLOOD PRESSURE: 90 MMHG

## 2024-05-16 DIAGNOSIS — E53.8 FOLATE DEFICIENCY: ICD-10-CM

## 2024-05-16 DIAGNOSIS — D51.9 ANEMIA DUE TO VITAMIN B12 DEFICIENCY, UNSPECIFIED B12 DEFICIENCY TYPE: ICD-10-CM

## 2024-05-16 DIAGNOSIS — E11.9 TYPE 2 DIABETES MELLITUS WITHOUT COMPLICATION, WITH LONG-TERM CURRENT USE OF INSULIN (HCC): Primary | ICD-10-CM

## 2024-05-16 DIAGNOSIS — N18.2 CHRONIC KIDNEY DISEASE (CKD) STAGE G2/A2, MILDLY DECREASED GLOMERULAR FILTRATION RATE (GFR) BETWEEN 60-89 ML/MIN/1.73 SQUARE METER AND ALBUMINURIA CREATININE RATIO BETWEEN 30-299 MG/G: ICD-10-CM

## 2024-05-16 DIAGNOSIS — D62 ACUTE BLOOD LOSS ANEMIA: ICD-10-CM

## 2024-05-16 DIAGNOSIS — K92.1 MELENA: ICD-10-CM

## 2024-05-16 DIAGNOSIS — Z79.4 TYPE 2 DIABETES MELLITUS WITHOUT COMPLICATION, WITH LONG-TERM CURRENT USE OF INSULIN (HCC): Primary | ICD-10-CM

## 2024-05-16 DIAGNOSIS — D50.0 IRON DEFICIENCY ANEMIA DUE TO CHRONIC BLOOD LOSS: ICD-10-CM

## 2024-05-16 LAB
ERYTHROCYTE [DISTWIDTH] IN BLOOD BY AUTOMATED COUNT: 19.2 % (ref 11.6–15.1)
HCT VFR BLD AUTO: 28.6 % (ref 36.5–49.3)
HGB BLD-MCNC: 7.7 G/DL (ref 12–17)
MCH RBC QN AUTO: 19.4 PG (ref 26.8–34.3)
MCHC RBC AUTO-ENTMCNC: 26.9 G/DL (ref 31.4–37.4)
MCV RBC AUTO: 72 FL (ref 82–98)
PLATELET # BLD AUTO: 234 THOUSANDS/UL (ref 149–390)
PMV BLD AUTO: 9.2 FL (ref 8.9–12.7)
RBC # BLD AUTO: 3.97 MILLION/UL (ref 3.88–5.62)
WBC # BLD AUTO: 4.71 THOUSAND/UL (ref 4.31–10.16)

## 2024-05-16 PROCEDURE — 36415 COLL VENOUS BLD VENIPUNCTURE: CPT

## 2024-05-16 PROCEDURE — 99214 OFFICE O/P EST MOD 30 MIN: CPT | Performed by: PHYSICIAN ASSISTANT

## 2024-05-16 PROCEDURE — 85027 COMPLETE CBC AUTOMATED: CPT

## 2024-05-16 RX ORDER — INSULIN ASPART INJECTION 100 [IU]/ML
INJECTION, SOLUTION SUBCUTANEOUS
Qty: 27 ML | Refills: 1 | Status: SHIPPED | OUTPATIENT
Start: 2024-05-16

## 2024-05-16 RX ORDER — BLOOD SUGAR DIAGNOSTIC
STRIP MISCELLANEOUS
Qty: 360 EACH | Refills: 2 | Status: SHIPPED | OUTPATIENT
Start: 2024-05-16

## 2024-05-16 RX ORDER — INSULIN GLARGINE 100 [IU]/ML
27 INJECTION, SOLUTION SUBCUTANEOUS
Qty: 27 ML | Refills: 1 | Status: SHIPPED | OUTPATIENT
Start: 2024-05-16

## 2024-05-16 NOTE — ASSESSMENT & PLAN NOTE
Lab Results   Component Value Date    HGBA1C 14.0 (H) 12/10/2023   Summary: A1c greatly improved, although suspect likely falsely lower than predicted in setting of anemia. BG trends on target except for FBG which is not ideal, usually 150-190.   Reviewed BG targets - , with < 130 preferred for FBG. A1c target 6.5% in setting of CAD.  BG monitoring: recommend continue fingerstick BG monitoring AC and HS - to send in BG log in 2 weeks for review. Offered trial of CGM, but he respectfully declines, prefers SMBG via fingerstick instead.  Diet: recommend referral to CDE for MNT when able. Will consider, currently has more priority health issues (planned colorectal surgery).  Exercise: encouraged to help BG control.  Pharmacotherapy discussion: hesitant to start GLP1-RA. Reviewed potential cardioprotective benefits, common AE. May wish to consider in future, following surgery reccovery. For now, given impending surgery, improved BG at present, would recommend continue basal-bolus insulin regimen. Continue Lantus at 27 units nightly. Continue Fiasp but recommend change to 3 units with breakfast, 5 units with lunch and dinner, with introduction of scale coverage PRN, ISF 25 > 150. To consider introduction of GLP1-RA, SGLT2i or metformin at next follow up with hopeful plans to down-titrate insulin.  Labs: recommend follow up A1c, Urine albumin/creatinine ratio, BMP in 3 months.  Return to office: 3 months.

## 2024-05-16 NOTE — PATIENT INSTRUCTIONS
Continue Lantus 27 units nightly.     Follow new scale with Fiasp as needed, in addition to new base mealtime dosing of 3 units with breakfast, 5 units with lunch and dinner (see insulin dosing instructions below).   If BG < 100, ok to reduce mealtime insulin by 50%.   If pre meal BG < 70, hold next dose.

## 2024-05-16 NOTE — ASSESSMENT & PLAN NOTE
Lab Results   Component Value Date    EGFR 66 04/27/2024    EGFR 67 04/20/2024    EGFR 74 04/10/2024    CREATININE 1.19 04/27/2024    CREATININE 1.18 04/20/2024    CREATININE 1.08 04/10/2024   Noted on serial lab review, with probable decline in GFR in setting of acute anemia, although suspect CKD stage 2 present prior to this.   Will monitor closely, consider addition of SGLT2i, ACE/ARB for renal protective benefits.  To follow up Albumin/creatinine ratio - unable to calculate.

## 2024-05-16 NOTE — PROGRESS NOTES
Brent Hall 58 y.o. male MRN: [id number]    Encounter: [id number]      Assessment & Plan   Problem List Items Addressed This Visit          Endocrine    Type 2 diabetes mellitus without complication, with long-term current use of insulin ([facility]) - Primary       Lab Results   Component Value Date    HGBA1C 14.0 (H) 12/10/2023   Summary: A1c greatly improved, although suspect likely falsely lower than predicted in setting of anemia. BG trends on target except for FBG which is not ideal, usually 150-190.   Reviewed BG targets - , with < 130 preferred for FBG. A1c target 6.5% in setting of CAD.  BG monitoring: recommend continue fingerstick BG monitoring AC and HS - to send in BG log in 2 weeks for review. Offered trial of CGM, but he respectfully declines, prefers SMBG via fingerstick instead.  Diet: recommend referral to CDE for MNT when able. Will consider, currently has more priority health issues (planned colorectal surgery).  Exercise: encouraged to help BG control.  Pharmacotherapy discussion: hesitant to start GLP1-RA. Reviewed potential cardioprotective benefits, common AE. May wish to consider in future, following surgery reccovery. For now, given impending surgery, improved BG at present, would recommend continue basal-bolus insulin regimen. Continue Lantus at 27 units nightly. Continue Fiasp but recommend change to 3 units with breakfast, 5 units with lunch and dinner, with introduction of scale coverage PRN, ISF 25 > 150. To consider introduction of GLP1-RA, SGLT2i or metformin at next follow up with hopeful plans to down-titrate insulin.  Labs: recommend follow up A1c, Urine albumin/creatinine ratio, BMP in 3 months.  Return to office: 3 months.         Relevant Medications    Fiasp FlexTouch 100 units/mL injection pen    Lantus SoloStar 100 units/mL SOPN    glucose blood (OneTouch Verio) test strip    Other Relevant Orders    Ambulatory referral to Diabetic Education    Albumin / creatinine urine  ratio    Basic metabolic panel    Hemoglobin A1C       Genitourinary    Chronic kidney disease (CKD) stage G2/A2, mildly decreased glomerular filtration rate (GFR) between 60-89 mL/min/1.73 square meter and albuminuria creatinine ratio between  mg/g     Lab Results   Component Value Date    EGFR 66 04/27/2024    EGFR 67 04/20/2024    EGFR 74 04/10/2024    CREATININE 1.19 04/27/2024    CREATININE 1.18 04/20/2024    CREATININE 1.08 04/10/2024   Noted on serial lab review, with probable decline in GFR in setting of acute anemia, although suspect CKD stage 2 present prior to this.   Will monitor closely, consider addition of SGLT2i, ACE/ARB for renal protective benefits.  To follow up Albumin/creatinine ratio - unable to calculate.          I have spent a total time of 40 minutes on 05/16/24 in caring for this patient including Diagnostic results, Patient and family education, Counseling / Coordination of care, and Obtaining or reviewing history  .      CC: Diabetes    History of Present Illness     HPI:  Brent Hall is a 58 y.o. male with type 2 DM here for routine follow up visit.  Diabetes is complicated by: CAD, probable CKD stage 2.  Last endocrinology visit: 2/16/24    Unfortunately diagnosed with adenocarcinoma of ascending colon, with planned R colon resection     Brent reports much better BG control on insulin. He is actively working to reduce insulin dose, desires an alternative DM medication for long term maintenance if possible.     BG checks continue via fingerstick.   Currently checking at least 3 - 5 times daily. No formal log today but can recall values easily from memory  -190  Before dinner BG is - 70 -130  Evening / bedtime BG - 130s - 140s  Lowest BG recently = 71.    Nocturia - rare.    Diet - trying best to be mindful of diabetic diet. Cut out sugary beverages in particular (iced tea).  No dietician visit since DM diagnosis.    Exercise - currently increasing physical activity, more  routine.    Current DM medication review:   Lantus 27 units at bedtime  Fiasp 6-7 units BID AC with lunch and dinner    Of note, Ozempic was prescribed at last visit but he was reluctant to try it based on his friend's poor experience. Has supply but did not start.    DM foot exam: up to date  DM eye exam: not yet       Review of Systems   Constitutional:  Negative for chills and fever.   HENT:  Negative for ear pain and sore throat.    Eyes:  Negative for pain and visual disturbance.   Respiratory:  Negative for cough and shortness of breath.    Cardiovascular:  Negative for chest pain and palpitations.   Gastrointestinal:  Negative for abdominal pain and vomiting.   Endocrine: Negative for polydipsia and polyuria.   Genitourinary:  Negative for dysuria and hematuria.   Musculoskeletal:  Negative for arthralgias and back pain.   Skin:  Negative for color change and rash.   Neurological:  Negative for seizures and syncope.   All other systems reviewed and are negative.      Historical Information   Past Medical History:   Diagnosis Date    Anemia     CAD (coronary artery disease)     Diabetes mellitus (HCC)     Hypertension     Obesity (BMI 30-39.9)      Past Surgical History:   Procedure Laterality Date    CARDIAC CATHETERIZATION Left 12/11/2023    Procedure: Cardiac Left Heart Cath;  Surgeon: Nacho Salinas DO;  Location: [facility];  Service: Cardiology    CARDIAC CATHETERIZATION N/A 12/11/2023    Procedure: Cardiac Coronary Angiogram;  Surgeon: Nacho Salinas DO;  Location: [facility];  Service: Cardiology    CARDIAC CATHETERIZATION  12/11/2023    Procedure: Cardiac catheterization;  Surgeon: Nacho Salinas DO;  Location: [facility];  Service: Cardiology    ULNAR NERVE REPAIR Left 2018    WRIST ARTHRODESIS Bilateral     2019, 2014     Social History   Social History     Substance and Sexual Activity   Alcohol Use Not Currently    Comment: very rare     Social  History     Substance and Sexual Activity   Drug Use Never     Social History     Tobacco Use   Smoking Status Former    Current packs/day: 0.00    Types: Cigarettes    Quit date: 2010    Years since quittin.4   Smokeless Tobacco Never     Family History:   Family History   Problem Relation Age of Onset    Diabetes Mother     Diabetes Father     Valvular heart disease Father     Atrial fibrillation Father     Diabetes Sister     Lung disease Sister     Heart failure Sister        Meds/Allergies   Current Outpatient Medications   Medication Sig Dispense Refill    Alcohol Swabs 70 % PADS May substitute brand based on insurance coverage. Check glucose ACHS. 200 each 0    atorvastatin (LIPITOR) 80 mg tablet Take 1 tablet (80 mg total) by mouth daily with dinner 90 tablet 3    Blood Glucose Monitoring Suppl (OneTouch Verio Reflect) w/Device KIT May substitute brand based on insurance coverage. Check glucose ACHS. 1 kit 0    Fiasp FlexTouch 100 units/mL injection pen E11.65 Inject 10 units with meals plus scale. TDD 50 units (Patient taking differently: 6-7 Units 2 (two) times a day with meals E11.65 Inject 10 units with meals plus scale. TDD 50 units) 45 mL 3    glucose blood (OneTouch Verio) test strip Check blood sugars 4 times daily 200 each 5    Insulin Pen Needle (BD Pen Needle Andreia 2nd Gen) 32G X 4 MM MISC 4 times daily insulin 400 each 1    Insulin Pen Needle (BD Pen Needle Andreia 2nd Gen) 32G X 4 MM MISC For use with insulin pen. Pharmacy may dispense brand covered by insurance. 100 each 0    Lantus SoloStar 100 units/mL SOPN Inject 27 Units under the skin daily at bedtime      metoprolol succinate (TOPROL-XL) 25 mg 24 hr tablet Take 1 tablet (25 mg total) by mouth daily 90 tablet 3    nitroglycerin (NITROSTAT) 0.4 mg SL tablet Place 1 tablet (0.4 mg total) under the tongue every 5 (five) minutes as needed for chest pain 30 tablet 0    OneTouch Delica Lancets 33G MISC May substitute brand based on  "insurance coverage. Check glucose ACHS. 200 each 0    pantoprazole (PROTONIX) 40 mg tablet TAKE 1 TABLET BY MOUTH TWICE A DAY 60 tablet 5    clopidogrel (Plavix) 75 mg tablet Take 1 tablet (75 mg total) by mouth daily (Patient not taking: Reported on 4/19/2024) 30 tablet 11    ferrous sulfate 324 (65 Fe) mg Take 1 tablet (324 mg total) by mouth daily before breakfast (Patient not taking: Reported on 4/19/2024) 30 tablet 11    furosemide (LASIX) 20 mg tablet Take 1 tablet (20 mg total) by mouth daily (Patient not taking: Reported on 5/16/2024) 30 tablet 11    [START ON 6/3/2024] metroNIDAZOLE (FLAGYL) 500 mg tablet 1 PM AND 10PM Do not start before Ananya 3, 2024. (Patient not taking: Reported on 5/16/2024 Do not start before Ananya 3, 2024.) 2 tablet 0    [START ON 6/3/2024] neomycin (MYCIFRADIN) 500 mg tablet 1pm and 10pm Do not start before Ananya 3, 2024. (Patient not taking: Reported on 5/16/2024 Do not start before Ananya 3, 2024.) 2 tablet 0     No current facility-administered medications for this visit.     No Known Allergies    Objective   Vitals: Blood pressure 138/90, pulse 90, height 5' 10\" (1.778 m), weight 100 kg (221 lb), SpO2 98%.    Physical Exam  Vitals reviewed.   Constitutional:       Appearance: He is not ill-appearing.   Cardiovascular:      Rate and Rhythm: Normal rate and regular rhythm.   Pulmonary:      Effort: Pulmonary effort is normal. No respiratory distress.      Breath sounds: Normal breath sounds.   Musculoskeletal:      Right lower leg: No edema.      Left lower leg: No edema.   Skin:     General: Skin is warm and dry.   Neurological:      Mental Status: He is alert.   Psychiatric:         Mood and Affect: Mood normal.         The history was obtained from the review of the chart, patient.    Lab Results:   Lab Results   Component Value Date    HGBA1C 14.0 (H) 12/10/2023     Labs from [facility] reviewed - 5/8/24:  A1c 6.6%  Albumin/creatinine ratio - unable to calculate.    Component      Latest " Ref Rng 4/27/2024 5/8/2024   WBC      4.31 - 10.16 Thousand/uL 5.98  5.51    RBC      3.88 - 5.62 Million/uL 3.82 (L)  4.03    Hemoglobin      12.0 - 17.0 g/dL 7.6 (L)  8.2 (L)    Hematocrit      36.5 - 49.3 % 27.8 (L)  29.1 (L)    MCV      82 - 98 fL 73 (L)  72 (L)    MCH      26.8 - 34.3 pg 19.9 (L)  20.3 (L)    MCHC      31.4 - 37.4 g/dL 27.3 (L)  28.2 (L)    RDW      11.6 - 15.1 % 17.9 (H)  19.5 (H)    MPV      8.9 - 12.7 fL 9.3  9.9    Platelet Count      149 - 390 Thousands/uL 275  248    nRBC      /100 WBCs 0  0    Segmented %      43 - 75 % 71  62    Immature Grans %      0 - 2 % 0  0    Lymphocytes %      14 - 44 % 18  24    Monocytes %      4 - 12 % 9  12    Eosinophils %      0 - 6 % 1  2    Basophils %      0 - 1 % 1  0    Absolute Neutrophils      1.85 - 7.62 Thousands/µL 4.26  3.39    Absolute Immature Grans      0.00 - 0.20 Thousand/uL 0.02  0.02    Absolute Lymphocytes      0.60 - 4.47 Thousands/µL 1.07  1.32    Absolute Monocytes      0.17 - 1.22 Thousand/µL 0.53  0.68    Absolute Eosinophils      0.00 - 0.61 Thousand/µL 0.06  0.08    Absolute Basophils      0.00 - 0.10 Thousands/µL 0.04  0.02    Sodium      135 - 147 mmol/L 136     Potassium      3.5 - 5.3 mmol/L 3.9     Chloride      96 - 108 mmol/L 101     Carbon Dioxide      21 - 32 mmol/L 25     ANION GAP      4 - 13 mmol/L 10     BUN      5 - 25 mg/dL 19     Creatinine      0.60 - 1.30 mg/dL 1.19     GLUCOSE      65 - 140 mg/dL 154 (H)     Calcium      8.4 - 10.2 mg/dL 9.3     AST      13 - 39 U/L 15     ALT      7 - 52 U/L 15     ALK PHOS      34 - 104 U/L 69     Total Protein      6.4 - 8.4 g/dL 6.6     Albumin      3.5 - 5.0 g/dL 4.4     Total Bilirubin      0.20 - 1.00 mg/dL 0.73     GFR, Calculated      ml/min/1.73sq m 66     Iron Saturation      15 - 50 %  5 (L)    TIBC      250 - 450 ug/dL  663 (H)    Iron      50 - 212 ug/dL  32 (L)    UIBC      155 - 355 ug/dL  631 (H)    FERRITIN      24 - 336 ng/mL  5 (L)       Legend:  (L) Low  (H)  "High    Imaging Studies: n/a    Portions of the record may have been created with voice recognition software. Occasional wrong word or \"sound a like\" substitutions may have occurred due to the inherent limitations of voice recognition software. Read the chart carefully and recognize, using context, where substitutions have occurred.  "

## 2024-05-17 ENCOUNTER — TELEPHONE (OUTPATIENT)
Dept: CARDIOLOGY CLINIC | Facility: CLINIC | Age: 59
End: 2024-05-17

## 2024-05-17 DIAGNOSIS — K21.9 GASTROESOPHAGEAL REFLUX DISEASE WITHOUT ESOPHAGITIS: Primary | ICD-10-CM

## 2024-05-17 RX ORDER — NEOMYCIN SULFATE 500 MG/1
TABLET ORAL
Qty: 2 TABLET | Refills: 0 | Status: SHIPPED | OUTPATIENT
Start: 2024-05-17 | End: 2024-05-17

## 2024-05-17 NOTE — LETTER
May 30, 2024    Brent Hall  [address] [place] [id number]      Dear Dr. Azam Carreon:    Brent was evaluated in our office on 4/19/2024. He is currently without anginal complaints or evidence of overt heart failure. He is at mildly elevated but acceptable cardiac risk to proceed with colon resection. OK for 5-7 day Plavix hold pre-op, please resume as soon as deemed safe. Recommend post-op ECG. Please continue beta blocker clarice-operatively. Monitor volume status clarice-operatively.    If you have any questions or concerns, please don't hesitate to call.    Sincerely,    NEVAEH Yost

## 2024-05-17 NOTE — TELEPHONE ENCOUNTER
Brent is scheduled to undergo colon resection in late June. The surgeon has asked for cardiology clearance prior. Can you get him in with either me or Dr. Valladares in early June for pre-op visit?  Thank you!

## 2024-05-20 ENCOUNTER — HOSPITAL ENCOUNTER (OUTPATIENT)
Dept: INFUSION CENTER | Facility: HOSPITAL | Age: 59
Discharge: HOME | End: 2024-05-20
Attending: INTERNAL MEDICINE
Payer: COMMERCIAL

## 2024-05-20 VITALS
RESPIRATION RATE: 16 BRPM | HEART RATE: 87 BPM | OXYGEN SATURATION: 96 % | SYSTOLIC BLOOD PRESSURE: 107 MMHG | DIASTOLIC BLOOD PRESSURE: 62 MMHG | TEMPERATURE: 96.9 F

## 2024-05-20 DIAGNOSIS — D62 ACUTE BLOOD LOSS ANEMIA: Primary | ICD-10-CM

## 2024-05-20 DIAGNOSIS — C80.1 ADENOCARCINOMA (HCC): ICD-10-CM

## 2024-05-20 DIAGNOSIS — D50.0 IRON DEFICIENCY ANEMIA DUE TO CHRONIC BLOOD LOSS: ICD-10-CM

## 2024-05-20 RX ORDER — SODIUM CHLORIDE 9 MG/ML
20 INJECTION, SOLUTION INTRAVENOUS ONCE
Status: CANCELLED | OUTPATIENT
Start: 2024-05-30

## 2024-05-20 RX ORDER — SODIUM CHLORIDE 9 MG/ML
20 INJECTION, SOLUTION INTRAVENOUS ONCE
Status: COMPLETED | OUTPATIENT
Start: 2024-05-20 | End: 2024-05-20

## 2024-05-20 RX ADMIN — SODIUM CHLORIDE 20 ML/HR: 0.9 INJECTION, SOLUTION INTRAVENOUS at 13:48

## 2024-05-20 RX ADMIN — IRON SUCROSE 300 MG: 20 INJECTION, SOLUTION INTRAVENOUS at 13:48

## 2024-05-20 NOTE — PROGRESS NOTES
Patient denies any current infections or antibiotic use. Patient tolerated IV Venofer well with no complications.      Brent Hall is aware of future appt on 05/30/24 at 1400.     AVS printed and given to Brent Hall:

## 2024-05-20 NOTE — TELEPHONE ENCOUNTER
Left message for patient to call back and schedule a pre op clearance with either provider in early June

## 2024-05-21 DIAGNOSIS — K21.9 GASTROESOPHAGEAL REFLUX DISEASE WITHOUT ESOPHAGITIS: ICD-10-CM

## 2024-05-22 RX ORDER — PANTOPRAZOLE SODIUM 40 MG/1
40 TABLET, DELAYED RELEASE ORAL 2 TIMES DAILY
Qty: 60 TABLET | Refills: 0 | OUTPATIENT
Start: 2024-05-22

## 2024-05-22 NOTE — TELEPHONE ENCOUNTER
Caller: Destiny (Spouse)     Doctor: NEVAEH Yost     Reason for call: Destiny is requesting a call back to discuss pre-op appointment. I offered to schedule an appt with Dr. Valladares appt was declined. I could not schedule a pre-op with NEVAEH Yost     Call back#: [phone]

## 2024-05-22 NOTE — TELEPHONE ENCOUNTER
Left message for patient to call back and schedule a pre op clearance with either provider in early June     Attempted to reach patient 3x mailed letter

## 2024-05-23 ENCOUNTER — TELEPHONE (OUTPATIENT)
Dept: HEMATOLOGY ONCOLOGY | Facility: CLINIC | Age: 59
End: 2024-05-23

## 2024-05-23 NOTE — TELEPHONE ENCOUNTER
Lab Inquiry   Who are you speaking with? Patient     If it is not the patient, are they listed on an active communication consent form? N/A   Name of ordering provider NEVAEH Mclain   What is being requested? Lab orders need to be entered   Lab draw location [facility]   What is the best call back number? [phone]    If patient at the lab, Was a live attempts to contact the team made? N/a

## 2024-05-23 NOTE — TELEPHONE ENCOUNTER
Caller: Destiny Hall     Doctor: NEVAEH Yost     Reason for call:Requesting CC for Pt scheduled to have mass removal surgery on 6/24/24. Pt seen on 4/19/24 in Office. Can CC be given from that visit or is another ov required?     Call back#: [phone]

## 2024-05-24 ENCOUNTER — TELEPHONE (OUTPATIENT)
Age: 59
End: 2024-05-24

## 2024-05-24 DIAGNOSIS — D62 ACUTE BLOOD LOSS ANEMIA: Primary | ICD-10-CM

## 2024-05-24 NOTE — TELEPHONE ENCOUNTER
Holding 6/24/24  at  [facility].    Attempted to contact patient to schedule surgery,no answer. Left v/m requesting call back. Waiting on response from patient.    Diabetic   Plavix  Surgical bag   Abx   Bloodwork  Cardiac clearance   SOC     Post op

## 2024-05-24 NOTE — LETTER
[facility]  [address]    [facility] [address]  Phone#  [phone]    Medicine Instructions for Adults with Diabetes who Need a Bowel Prep       Follow these instructions when a BOWEL PREP is required for your procedure or surgery!    NOTE:   GLP-1 Agonists taken weekly: do not take in the 7 days before your procedure   SGLT-2 Inhibitors: do not take in the 4 days before your procedure     On the Day Before Surgery/Procedure  If you are having a procedure (e.g. Colonoscopy) or surgery that requires a bowel prep and you may have at least a clear liquid diet, follow the directions below based on the type of medicine you take for your diabetes.     Type of Medicine You Take Examples What to do   Pre-Mixed Insulin - Intermediate Acting Humalog® 75/25, Humulin® 70/30, Novolog® 70/30, Regular Insulin Take ½ your regular dose the evening before your procedure   Rapid/Fast Acting Insulin Humalog® U200, NovoLog®, Apidra®, Fiasp® Take ½ your regular dose the evening before your procedure.   Long-Acting Insulin Lantus®, Levemir®, Tresiba®, Toujeo®, Basaglar® Take your FULL regular dose the day before procedure   Oral Sulfonylurea Glipizide/Glimepiride/Glucotrol® Take ½ your regular dose the evening before your procedure   Other Oral Diabetes Medicines Metformin®, Glucophage®, Glucophage XR®, Riomet®, Glumetza®), Actose®, Avandia®, Glyset®, Prandin® Take your regular dose with dinner in the evening before your procedure   GLP-1 Agonists AdlyxinÒ, ByettaÒ, BydureonÒ, OzempicÒ, SoliquaÒ, TanzeumÒ, TrulicityÒ, VictozaÒ, Saxenda®, Rybelsus® If taken daily, take as normal    If taken weekly, do not take this medicine for 7 days before your procedure including the day of the procedure (resume taking after the procedure)   SGLT-2 Inhibitors Jardiance®, Invokana®, Farxiga®,   Steglatro®, Brenzavvy®, Qtern®, Segluromet®, Glyxambi®, Synjardy®, Synjardy XR®, Invokamet®, Invokamet XR®,  Trijary XR®, Xigduo XR®, Steglujan® Do not take for 4 days before your procedure including the day of the procedure (resume taking after the procedure)                More information continued on back                    Medicine Instructions for Adults with Diabetes who Need a Bowel Prep  Page 2        On the Day of Surgery/Procedure  Follow the directions below based on the type of medicine you take for your diabetes.     Type of Medicine You Take Examples What to do   Long-Acting Insulin Lantus®, Levemir®, Tresiba®, Toujeo®, Basaglar®, Semglee®   If you usually take your Long-Acting Insulin in the morning, take the full dose as scheduled.   GLP-1 Agonists AdlyxinÒ, ByettaÒ, BydureonÒ, OzempicÒ, SoliquaÒ, TanzeumÒ, TrulicityÒ, VictozaÒ, Saxenda®, Rybelsus® Do NOT take this medicine on the day of your procedure (resume taking after the procedure)       On the Day of Surgery/Procedure (continued)  Except for the morning Long-Acting Insulin, DO NOT take ANY diabetic medicine on the day of your procedure unless you were instructed by the doctor who manages your diabetes medicines.    Continue to check your blood sugars.  If you have an insulin pump, ask your endocrinologist for instructions at least 3 days before your procedure. NOTE: If you are not able to ask your endocrinologist in advance, on the day of the procedure set your insulin pump to your basal rate only. Bring your insulin pump supplies to the hospital.     If you have any questions about taking your diabetes medicines prior to your procedure, please contact the doctor who manages your diabetes medicines.

## 2024-05-24 NOTE — LETTER
Brent Hall  [address] [place] [id number]        5/29/2024    Dear Brent Hall,    Your surgery is scheduled for: June 24, 2024    The hospital will call the evening prior to your surgery with your expected arrival time.   Location:[address] [address]    CHECK LIST PRIOR TO INPATIENT SURGERY  It is your responsibility to obtain any/all referrals needed for your surgery if required by your insurance. Our office will contact you to discuss your insurance coverage for this procedure.    Special instructions required if you are taking any blood thinners. Please verify with the prescribing physician. Examples include Coumadin, Plavix, Xarelto, Eliquis, Pradaxa, etc.    Please check with your family physician if you are taking the following medications: Aspirin or any Aspirin containing medication, Gingko biloba, Ginseng, Feverfew, and/or Brooke’s Wort. We suggest stopping these for 3 days.     The night before and the day of your surgery, wash from your neck to groin with chlorhexidine soap.  This soap is available at most retail pharmacies under such brand names as Hibiclens, Endure or Aplicare.    Pre-admission testing Required: YES X     If Yes, what type:  BLOOD-WORK X     Cardiac Clearance Required:  YES X    BOWEL PREPARATION REQUIRED: YES X  If yes, start date: 6/23/24. Please see attached bowel preparation instructions.    Please do not hesitate to call our office with any questions regarding your surgery.                         MIRALAX/GATORADE SURGERY PREPARATION INSTRUCTIONS    Purchase:   (1) 238g bottle of MiraLax or Glycolax - no prescription needed   4 Dulcolax Laxative Tablets - no prescription needed   64 oz. bottle of Gatorade (NOT RED), Crystal Light, or another clear liquid of  choice.   **REMEMBER** A prescription for antibiotics has been called into your pharmacy for  prior to your surgery.    One Day Prior to Surgery  Have a  normal breakfast, light lunch, and clear liquids thereafter.  It is important that you drink plenty of clear liquids throughout the day to prevent dehydration.  CLEAR LIQUIDS INCLUDE:  Water, Clear Fruit Juice (Apple, Cranberry, Grape), Black Coffee, Tea, Ginger Ale, Gatorade, Wojciech-Aid, Hi-C, plain Jello, Ice Pops, Clear Broth or Bouillon, Crystal Light, Lemonade, Soda (regular or diet).    No Milk Products!    At 1:00 pm, take (4) Dulcolax Tablets with 8 oz. of water.  Swallow the tablets whole with a full glass of water.  The package may direct you not to exceed (2) tablets at any time but for the purpose of this examination, you should take (4).    At 1:00 pm, take your first dose of antibiotic as prescribed.    At 1:00 pm, Mix the 238g bottle of MiraLax in 64 oz. of Gatorade and shake the solution until the MiraLax is dissolved.  Drink 8 oz. glassfuls at your own pace.  It may take 3-4 hours to drink all the solution.    At 10:00 pm, take your last dose of antibiotic as prescribed.    REMEMBER TO STAY CLOSE TO TOILET FACILITIES.    The Day of Surgery  Take nothing by mouth until after surgery.                                                Post-Operative Care Information  Abdominal Surgery  What are my post-operative instructions?   The following information and instructions are for your continued care after discharge from the hospital. Please read the information (including the After Visit Summary provided to you at the time of discharge) carefully. If you have any questions or concerns, please contact the clinical staff at [phone]    How do I take care of my incision?  Your incision(s) may have surgical glue, dissolvable sutures with white pieces of tape called steri strips, or staples.   If you have steri strips or surgical glue, do not remove them. Steri strips will fall off naturally in 7-10 days. Surgical glue (Exofin) will fall off over a period of up to 2-3 weeks.   Do not put any topical ointments  or lotions on the incisions.   Avoid tight clothing around your incision(s) or fabric that may irritate your skin such as wool, hooks and timothy.     Should I continue taking my prescribed medications?   Take medications as prescribed. Please review the After Visit Summary provided to you at the time of discharge for details.     How will I manage my pain at home?  For best pain control take your pain medication at regular intervals for the first couple of days, about every 4-6 hours. This will prevent pain build-up, which occurs when medication is taken on an as needed basis.   Use only as much prescription pain medication as you need (i.e. 1 tablet instead of 2).  Taper off the prescription pain medication as pain decreases, stopping narcotics first.   Use over-the-counter acetaminophen (Tylenolâ) if your doctor allows. When using over-the-counter medication, never use more than what is prescribed on the package directions. Do not take more than 3000mg of Tylenolâ in a 24 hour period. Do not take more than 2400mg of Ibuprofen (such as Motrinâ or Advilâ) in a 24 hour period.   While taking prescription pain medication you may not drive, work, or drink alcohol.     Are there any diet restrictions?   Continue low fiber diet up to 1 week post op.  (This allows the bowel to rest)    It is normal for bowel movements to be loose and occur more frequently post-operatively. This should improve over time.  During this time pay attention to hydration  1.5 weeks post op you may slowly begin to add fiber back into your diet.    By 2 weeks post op you may resume a normal high fiber diet.     What activity restrictions will I have?   Walk as much as tolerated.  Do not lift, pull, or push objects greater than 10 pounds for 6 weeks. This includes vacuuming, lifting children or groceries, walking the dog, mowing lawns, snow shoveling, etc. Please discuss other specific physical activities with the doctor at your post op  appointment.   Do not drive while taking pain medications. You may drive when you have no pain - usually in 1-2 weeks following surgery.   You may climb stairs in moderation but do not overtire.  Resume sexual activity after you are cleared by your doctor.             When will I receive follow-up care?   You will be scheduled to return to see your physician in the office typically in 3-4 weeks after surgery.    If you do not have a scheduled appointment at the time of discharge, please call the office at [phone].    When should I call my doctor?   Notify your doctor for any of the following signs and symptoms of possible infection:   Temperature above 101 degrees.   Increase in pain or discomfort.   Redness, swelling, drainage at incision site(s). A small amount of clear yellow or pinkish-yellow drainage is normal  If incision begins to open.     Call if you have any changes in your overall health such as having:   Nausea   Vomiting   Chills   profuse (excessive) sweating   inability to urinate or completely empty bladder   diarrhea   constipation

## 2024-05-24 NOTE — LETTER
[facility]  [address]    [place] [address]  Phone#  [phone]            Procedure Clearance Form    Mandy Holt,    Please complete the following surgical clearance form for our mutual patient:    Brent Hall 1965    Surgeon: Dr. Carreon    Type of Procedure: RESECTION COLON RIGHT LAPAROSCOPIC W/ ROBOTICS     Date of Procedure: 6/24/24       ______  No Contraindications Identified    ______ Surgery Contraindications - Further Work-up in Progress    Comments:   ________________________________________________________________________  ________________________________________________________________________   ________________________________________________________________________   ________________________________________________________________________   ________________________________________________________________________    Further Testing Required:   ________________________________________________________________________   ________________________________________________________________________   ________________________________________________________________________         _________________________________________  ________________________  Physician Signature       Date Faxed            Please fax completed response to [phone] Attention: Danilele brothers

## 2024-05-29 NOTE — TELEPHONE ENCOUNTER
I left a message for Brent asking him to call back. I wish to discuss with him prior to sending clearance note. Will await his call.

## 2024-05-29 NOTE — TELEPHONE ENCOUNTER
Patient scheduled for surgery  6/24/24  at [facility]. Instructions and PAT's gone over with and mailed to the patient.      Abx- received   Surgical bag- received   No anticoagulants per patient   Sees [facility] - clearance sent today   [facility]- patient aware  Diabetic- PW mailed     Post op scheduled

## 2024-05-30 ENCOUNTER — HOSPITAL ENCOUNTER (OUTPATIENT)
Dept: INFUSION CENTER | Facility: HOSPITAL | Age: 59
Discharge: HOME | End: 2024-05-30
Attending: INTERNAL MEDICINE
Payer: COMMERCIAL

## 2024-05-30 VITALS
HEART RATE: 72 BPM | RESPIRATION RATE: 16 BRPM | TEMPERATURE: 97 F | DIASTOLIC BLOOD PRESSURE: 53 MMHG | SYSTOLIC BLOOD PRESSURE: 111 MMHG | OXYGEN SATURATION: 96 %

## 2024-05-30 DIAGNOSIS — C80.1 ADENOCARCINOMA (HCC): ICD-10-CM

## 2024-05-30 DIAGNOSIS — D50.0 IRON DEFICIENCY ANEMIA DUE TO CHRONIC BLOOD LOSS: ICD-10-CM

## 2024-05-30 DIAGNOSIS — D62 ACUTE BLOOD LOSS ANEMIA: Primary | ICD-10-CM

## 2024-05-30 LAB
BASOPHILS # BLD AUTO: 0.02 THOUSANDS/ÂΜL (ref 0–0.1)
BASOPHILS NFR BLD AUTO: 1 % (ref 0–1)
EOSINOPHIL # BLD AUTO: 0.08 THOUSAND/ÂΜL (ref 0–0.61)
EOSINOPHIL NFR BLD AUTO: 2 % (ref 0–6)
ERYTHROCYTE [DISTWIDTH] IN BLOOD BY AUTOMATED COUNT: 23.4 % (ref 11.6–15.1)
HCT VFR BLD AUTO: 31.3 % (ref 36.5–49.3)
HGB BLD-MCNC: 8.6 G/DL (ref 12–17)
IMM GRANULOCYTES # BLD AUTO: 0.01 THOUSAND/UL (ref 0–0.2)
IMM GRANULOCYTES NFR BLD AUTO: 0 % (ref 0–2)
LYMPHOCYTES # BLD AUTO: 1.13 THOUSANDS/ÂΜL (ref 0.6–4.47)
LYMPHOCYTES NFR BLD AUTO: 27 % (ref 14–44)
MCH RBC QN AUTO: 20.3 PG (ref 26.8–34.3)
MCHC RBC AUTO-ENTMCNC: 27.5 G/DL (ref 31.4–37.4)
MCV RBC AUTO: 74 FL (ref 82–98)
MONOCYTES # BLD AUTO: 0.46 THOUSAND/ÂΜL (ref 0.17–1.22)
MONOCYTES NFR BLD AUTO: 11 % (ref 4–12)
NEUTROPHILS # BLD AUTO: 2.54 THOUSANDS/ÂΜL (ref 1.85–7.62)
NEUTS SEG NFR BLD AUTO: 59 % (ref 43–75)
NRBC BLD AUTO-RTO: 0 /100 WBCS
PLATELET # BLD AUTO: 230 THOUSANDS/UL (ref 149–390)
PMV BLD AUTO: 9.7 FL (ref 8.9–12.7)
RBC # BLD AUTO: 4.24 MILLION/UL (ref 3.88–5.62)
WBC # BLD AUTO: 4.24 THOUSAND/UL (ref 4.31–10.16)

## 2024-05-30 PROCEDURE — 96365 THER/PROPH/DIAG IV INF INIT: CPT

## 2024-05-30 PROCEDURE — 85025 COMPLETE CBC W/AUTO DIFF WBC: CPT | Performed by: INTERNAL MEDICINE

## 2024-05-30 RX ORDER — OMEPRAZOLE 40 MG/1
40 CAPSULE, DELAYED RELEASE ORAL DAILY
Qty: 30 CAPSULE | Refills: 11 | Status: SHIPPED | OUTPATIENT
Start: 2024-05-30

## 2024-05-30 RX ORDER — SODIUM CHLORIDE 9 MG/ML
20 INJECTION, SOLUTION INTRAVENOUS ONCE
Status: CANCELLED | OUTPATIENT
Start: 2024-06-03

## 2024-05-30 RX ORDER — SODIUM CHLORIDE 9 MG/ML
20 INJECTION, SOLUTION INTRAVENOUS ONCE
Status: COMPLETED | OUTPATIENT
Start: 2024-05-30 | End: 2024-05-30

## 2024-05-30 RX ADMIN — IRON SUCROSE 300 MG: 20 INJECTION, SOLUTION INTRAVENOUS at 13:46

## 2024-05-30 RX ADMIN — SODIUM CHLORIDE 20 ML/HR: 0.9 INJECTION, SOLUTION INTRAVENOUS at 13:47

## 2024-05-30 NOTE — TELEPHONE ENCOUNTER
Spoke with Brent. He states he is feeling great since getting 2 iron infusions. He denies chest pain, shortness of breath, leg swelling is resolved. He has stopped furosemide.  OK with clearance. Letter routed to his surgeon and is in his chart for review. OK for 5-7 day Plavix hold for surgery.

## 2024-05-30 NOTE — PROGRESS NOTES
Brent Hall tolerated IV Venofer well with no complications.      Brent Hall is aware of future appt on 06/03/24 at 1400.     AVS declined.

## 2024-05-31 ENCOUNTER — TELEMEDICINE (OUTPATIENT)
Dept: ANESTHESIOLOGY | Facility: CLINIC | Age: 59
End: 2024-05-31
Payer: COMMERCIAL

## 2024-05-31 DIAGNOSIS — Z79.4 TYPE 2 DIABETES MELLITUS WITHOUT COMPLICATION, WITH LONG-TERM CURRENT USE OF INSULIN (HCC): ICD-10-CM

## 2024-05-31 DIAGNOSIS — C80.1 ADENOCARCINOMA (HCC): ICD-10-CM

## 2024-05-31 DIAGNOSIS — I25.2 HISTORY OF ST ELEVATION MYOCARDIAL INFARCTION (STEMI): Primary | ICD-10-CM

## 2024-05-31 DIAGNOSIS — E11.9 TYPE 2 DIABETES MELLITUS WITHOUT COMPLICATION, WITH LONG-TERM CURRENT USE OF INSULIN (HCC): ICD-10-CM

## 2024-05-31 DIAGNOSIS — K63.89 COLONIC MASS: ICD-10-CM

## 2024-05-31 DIAGNOSIS — C18.2 PRIMARY ADENOCARCINOMA OF ASCENDING COLON (HCC): ICD-10-CM

## 2024-05-31 DIAGNOSIS — D50.0 IRON DEFICIENCY ANEMIA DUE TO CHRONIC BLOOD LOSS: ICD-10-CM

## 2024-05-31 DIAGNOSIS — N18.2 CHRONIC KIDNEY DISEASE (CKD) STAGE G2/A2, MILDLY DECREASED GLOMERULAR FILTRATION RATE (GFR) BETWEEN 60-89 ML/MIN/1.73 SQUARE METER AND ALBUMINURIA CREATININE RATIO BETWEEN 30-299 MG/G: ICD-10-CM

## 2024-05-31 PROCEDURE — 99443 PR PHYS/QHP TELEPHONE EVALUATION 21-30 MIN: CPT | Performed by: NURSE PRACTITIONER

## 2024-05-31 NOTE — H&P (VIEW-ONLY)
[facility]  CONSULT: SURGERY  OPTIMIZATION   Brief Visit    This Visit is being completed by telephone. The Patient is located at Home and in the following state in which I hold an active license [place]    The patient was identified by name and date of birth. Brent Hall was informed that this is a telemedicine visit and that the visit is being conducted through Telephone.  My office door was closed. No one else was in the room.  He acknowledged consent and understanding of privacy and security of the platform. The patient has agreed to participate and understands they can discontinue the visit at any time.    Patient is aware this is a billable service.     Assessment/Plan:   58 year old male referred to [facility] for pre-surgery optimization & [facility] program   Other consult concerns include: primary adenocarcinoma of ascending colon (HCC) & soc anemia review   He is scheduled on    Case: [id number] Date/Time: 06/24/24 0730   Procedure: RESECTION COLON RIGHT LAPAROSCOPIC W/ ROBOTICS (Abdomen)   Anesthesia type: General   Diagnosis: Primary adenocarcinoma of ascending colon (HCC) [C18.2]   Pre-op diagnosis: Primary adenocarcinoma of ascending colon (HCC) [C18.2]   Location: [facility] ROOM 14 / [facility]   Surgeons: Azam Carreon MD     LAST ANESTHESIA   REPORTS NO CONCERNS WITH PAST ANESTHESIA   Problem List Items Addressed This Visit       History of ST elevation myocardial infarction (STEMI) - Primary  DX DEC 2023  STARTED ON BLOOD THINNERS- WHICH LED TO BLEED  STABLE NOW  DENIES CP/DENIES SOB   METS 9   WILL HAVE CC - AWAITING LETTER       Type 2 diabetes mellitus without complication, with long-term current use of insulin (HCC)  STABLE   SSI RISK LOW   LAST HBA1C 6.6  CONTINUE ADA DIET      Colonic mass  Adenocarcinoma (HCC)  SEEN FOR SO   SEEN FOR SOC ANEMIA- ALREADY READY HAD 2 IRON INFUSIONS AND 1 UPRBCS.  DUE FOR ONE MORE IRON AND REPEAT LABS   STARTED  BEST   METS 9   AWAITING CC VIA LETTER   At risk for post-op TYRESE - NO   At risk for post-op SSI - NO   BEST   Breathing- instructed to exercise lungs prior to surgery via DEEP BREATHING   Eat- discussed increasing protein intake prior to surgery   Sleep/stress- encouraged 8-10 sleep @ night, stress reduction, avoid sick contacts and handwashing   Train- encouraged to remain active            Iron deficiency anemia due to chronic blood loss  HE WILL HAVE A TOTAL OF 3 DOSES OF PRE-SURGERY IV IRON   ALREADY RECEIVED 2 - DONE   HAD 1 UNIT OF BLOOD SINCE DC FROM HOSPITAL  AWAIT REPEAT LABS       Latest Reference Range & Units 05/30/24 14:24   WBC 4.31 - 10.16 Thousand/uL 4.24 (L)   RBC 3.88 - 5.62 Million/uL 4.24   Hemoglobin 12.0 - 17.0 g/dL 8.6 (L)   Hematocrit 36.5 - 49.3 % 31.3 (L)   MCV 82 - 98 fL 74 (L)   MCH 26.8 - 34.3 pg 20.3 (L)   MCHC 31.4 - 37.4 g/dL 27.5 (L)   RDW 11.6 - 15.1 % 23.4 (H)   Platelet Count 149 - 390 Thousands/uL 230   MPV 8.9 - 12.7 fL 9.7   nRBC /100 WBCs 0   (L): Data is abnormally low  (H): Data is abnormally high     Latest Reference Range & Units 05/08/24 12:03   Iron 50 - 212 ug/dL 32 (L)   FERRITIN 24 - 336 ng/mL 5 (L)   Iron Saturation 15 - 50 % 5 (L)   TIBC 250 - 450 ug/dL 663 (H)   UIBC 155 - 355 ug/dL 631 (H)   (L): Data is abnormally low  (H): Data is abnormally high          Chronic kidney disease (CKD) stage G2/A2, mildly decreased glomerular filtration rate (GFR) between 60-89 mL/min/1.73 square meter and albuminuria creatinine ratio between  mg/g  STABLE   TYRESE LOW    Latest Reference Range & Units 04/27/24 11:26   Sodium 135 - 147 mmol/L 136   Potassium 3.5 - 5.3 mmol/L 3.9   Chloride 96 - 108 mmol/L 101   Carbon Dioxide 21 - 32 mmol/L 25   ANION GAP 4 - 13 mmol/L 10   BUN 5 - 25 mg/dL 19   Creatinine 0.60 - 1.30 mg/dL 1.19   GLUCOSE 65 - 140 mg/dL 154 (H)   Calcium 8.4 - 10.2 mg/dL 9.3   AST 13 - 39 U/L 15   ALT 7 - 52 U/L 15   ALK PHOS 34 - 104 U/L 69   Total Protein 6.4  - 8.4 g/dL 6.6   Albumin 3.5 - 5.0 g/dL 4.4   Total Bilirubin 0.20 - 1.00 mg/dL 0.73   GFR, Calculated ml/min/1.73sq m 66   (H): Data is abnormally high       Other Visit Diagnoses       Primary adenocarcinoma of ascending colon (HCC)                Recent Visits  No visits were found meeting these conditions.  Showing recent visits within past 7 days and meeting all other requirements  Today's Visits  Date Type Provider Dept   05/31/24 Telemedicine [facility]   Showing today's visits and meeting all other requirements  Future Appointments  No visits were found meeting these conditions.  Showing future appointments within next 150 days and meeting all other requirements     Denies fevers and denies chills  Denies congestion and denies sore throat  Denies chest pain  Denies palpitations  Denies shortness of breath  Denies abdominal pain  Denies nausea, vomiting, and diarrhea  Denies any issues with their skin... example NO open wounds or sores  Denies rashes  Denies difficulty urinating  Denies any issues with their urine... example a dark color or an odor  Denies dizziness  Denies headaches  Denies confusion and denies hallucinations    Admits to being in well health today      Physical Assessment   We did not connect via video  Patient was alert and orientated times 3  Mood appropriate  Thought appropriate    I heard no respiratory distress over the phone today        Sabiha Kennedy is a 58-year-old male who was referred to SOC for presurgery optimization and SOC anemia review.  Brent explains back in December 2023 he went to the hospital was diagnosed with a heart attack.  He was started on blood thinners for his heart treatment and then he developed a GI bleed.  Further investigation of the GI bleed led to the diagnosis of a colonic mass/colon CA.  He is electing to have it removed.      I met with Brent over the telephone.  We did not connect via video.  He was at work.   He was offered a live visit in the SOC and declined.  States is too far for him to drive and he is working.  He was pleasant and a pleasure to care for.    He admits to being in good health today.  Denies any new developments in his health.  Denies chest pain.  Denies shortness of breath.  METS is 9.  He is currently getting blood work weekly.  He remains anemic.  Last hemoglobin level was 8.6.  He is getting a total of 3 doses of presurgery IV iron.  He already received 2 doses and tolerated them well.  He needs to have 1 more dose.  This is scheduled.  He also received a total of 1 unit of packed red blood cells.  He will get repeat blood work after this.  Await repeat blood work.    TYRESE risk low.  SSI risk low.      He lives at home he has support.  He is independent with all ADLs.    As always we discussed having your BEST surgery, and BEST recovery.  Surgery goals reviewed today.      Breathing exercises   Patient was encouraged to begin lung exercises today.  This could be accomplished through deep breathing and cough exercises.    Eating/nutrition   Encouraged patient to increase oral protein intake prior to surgery.    This can be accomplished by consuming chicken, fish, tuna fish, cottage cheese, cheese, eggs, Greek yogurt, and protein shakes as needed.  I encouraged use of protein shakes such ENLIVE.  I also recommended making your own protein shakes with protein powder.   Sleep/Stress management  Patient was encouraged to rest their body prior to surgery.  Encouraged attempting to get 8 hours of sleep at night.  Avoid stress.  Avoid sick contacts.  Encouraged to find a relaxing hobby such as reading, meditation, listening to music.  Training exercises  Patient was encouraged to remain active as possible.  Today bilateral lower extremity generic exercises were taught for muscle strengthening and balance.  All exercises to be done sitting down.       Visit Time  Total Visit Duration: 25 MINUTES         [facility]  CONSULT       Patient has the ability to take their vital signs at home no  Allergies reviewed today   PMH reviewed today   Medications reviewed today     See Geriatric Assessment below...    METS: 9.25       As always we discussed having your BEST surgery, and BEST recovery.  Surgery goals reviewed today.      Breathing exercises   Patient was encouraged to begin lung exercises today.  This could be accomplished through deep breathing and cough exercises.     Eating/nutrition   Encouraged patient to increase oral protein intake prior to surgery.  This can be accomplished by consuming chicken, fish, tuna fish, cottage cheese, cheese, eggs, Greek yogurt, and protein shakes as needed.  I encouraged use of protein shakes such ENLIVE.  I also recommended making your own protein shakes with protein powder.     Sleep/Stress management  Patient was encouraged to rest their body prior to surgery.  Encouraged attempting to get 8 hours of sleep at night.  Avoid stress.  Avoid sick contacts.  Encouraged to find a relaxing hobby such as reading, meditation, listening to music.  Training exercises  Patient was encouraged to remain active as possible.  Today bilateral lower extremity generic exercises were taught for muscle strengthening and balance.  All exercises to be done sitting down.

## 2024-05-31 NOTE — PROGRESS NOTES
[facility]  CONSULT: SURGERY  OPTIMIZATION   Brief Visit    This Visit is being completed by telephone. The Patient is located at Home and in the following state in which I hold an active license [place]    The patient was identified by name and date of birth. Brent Hall was informed that this is a telemedicine visit and that the visit is being conducted through Telephone.  My office door was closed. No one else was in the room.  He acknowledged consent and understanding of privacy and security of the platform. The patient has agreed to participate and understands they can discontinue the visit at any time.    Patient is aware this is a billable service.     Assessment/Plan:   58 year old male referred to [facility] for pre-surgery optimization & BEST program   Other consult concerns include: primary adenocarcinoma of ascending colon (HCC) & soc anemia review   He is scheduled on    Case: [id number] Date/Time: 06/24/24 0730   Procedure: RESECTION COLON RIGHT LAPAROSCOPIC W/ ROBOTICS (Abdomen)   Anesthesia type: General   Diagnosis: Primary adenocarcinoma of ascending colon (HCC) [C18.2]   Pre-op diagnosis: Primary adenocarcinoma of ascending colon (HCC) [C18.2]   Location: BE OR ROOM 14 / [facility]   Surgeons: Azam Carreon MD     LAST ANESTHESIA   REPORTS NO CONCERNS WITH PAST ANESTHESIA   Problem List Items Addressed This Visit       History of ST elevation myocardial infarction (STEMI) - Primary  DX DEC 2023  STARTED ON BLOOD THINNERS- WHICH LED TO BLEED  STABLE NOW  DENIES CP/DENIES SOB   METS 9   WILL HAVE CC - AWAITING LETTER       Type 2 diabetes mellitus without complication, with long-term current use of insulin (HCC)  STABLE   SSI RISK LOW   LAST HBA1C 6.6  CONTINUE ADA DIET      Colonic mass  Adenocarcinoma (HCC)  SEEN FOR SO   SEEN FOR SOC ANEMIA- ALREADY READY HAD 2 IRON INFUSIONS AND 1 UPRBCS.  DUE FOR ONE MORE IRON AND REPEAT LABS   STARTED  BEST   METS 9   AWAITING CC VIA LETTER   At risk for post-op TYRESE - NO   At risk for post-op SSI - NO   BEST   Breathing- instructed to exercise lungs prior to surgery via DEEP BREATHING   Eat- discussed increasing protein intake prior to surgery   Sleep/stress- encouraged 8-10 sleep @ night, stress reduction, avoid sick contacts and handwashing   Train- encouraged to remain active            Iron deficiency anemia due to chronic blood loss  HE WILL HAVE A TOTAL OF 3 DOSES OF PRE-SURGERY IV IRON   ALREADY RECEIVED 2 - DONE   HAD 1 UNIT OF BLOOD SINCE DC FROM HOSPITAL  AWAIT REPEAT LABS       Latest Reference Range & Units 05/30/24 14:24   WBC 4.31 - 10.16 Thousand/uL 4.24 (L)   RBC 3.88 - 5.62 Million/uL 4.24   Hemoglobin 12.0 - 17.0 g/dL 8.6 (L)   Hematocrit 36.5 - 49.3 % 31.3 (L)   MCV 82 - 98 fL 74 (L)   MCH 26.8 - 34.3 pg 20.3 (L)   MCHC 31.4 - 37.4 g/dL 27.5 (L)   RDW 11.6 - 15.1 % 23.4 (H)   Platelet Count 149 - 390 Thousands/uL 230   MPV 8.9 - 12.7 fL 9.7   nRBC /100 WBCs 0   (L): Data is abnormally low  (H): Data is abnormally high     Latest Reference Range & Units 05/08/24 12:03   Iron 50 - 212 ug/dL 32 (L)   FERRITIN 24 - 336 ng/mL 5 (L)   Iron Saturation 15 - 50 % 5 (L)   TIBC 250 - 450 ug/dL 663 (H)   UIBC 155 - 355 ug/dL 631 (H)   (L): Data is abnormally low  (H): Data is abnormally high          Chronic kidney disease (CKD) stage G2/A2, mildly decreased glomerular filtration rate (GFR) between 60-89 mL/min/1.73 square meter and albuminuria creatinine ratio between  mg/g  STABLE   TYRESE LOW    Latest Reference Range & Units 04/27/24 11:26   Sodium 135 - 147 mmol/L 136   Potassium 3.5 - 5.3 mmol/L 3.9   Chloride 96 - 108 mmol/L 101   Carbon Dioxide 21 - 32 mmol/L 25   ANION GAP 4 - 13 mmol/L 10   BUN 5 - 25 mg/dL 19   Creatinine 0.60 - 1.30 mg/dL 1.19   GLUCOSE 65 - 140 mg/dL 154 (H)   Calcium 8.4 - 10.2 mg/dL 9.3   AST 13 - 39 U/L 15   ALT 7 - 52 U/L 15   ALK PHOS 34 - 104 U/L 69   Total Protein 6.4  - 8.4 g/dL 6.6   Albumin 3.5 - 5.0 g/dL 4.4   Total Bilirubin 0.20 - 1.00 mg/dL 0.73   GFR, Calculated ml/min/1.73sq m 66   (H): Data is abnormally high       Other Visit Diagnoses       Primary adenocarcinoma of ascending colon (HCC)                Recent Visits  No visits were found meeting these conditions.  Showing recent visits within past 7 days and meeting all other requirements  Today's Visits  Date Type Provider Dept   05/31/24 Telemedicine [facility]   Showing today's visits and meeting all other requirements  Future Appointments  No visits were found meeting these conditions.  Showing future appointments within next 150 days and meeting all other requirements     Denies fevers and denies chills  Denies congestion and denies sore throat  Denies chest pain  Denies palpitations  Denies shortness of breath  Denies abdominal pain  Denies nausea, vomiting, and diarrhea  Denies any issues with their skin... example NO open wounds or sores  Denies rashes  Denies difficulty urinating  Denies any issues with their urine... example a dark color or an odor  Denies dizziness  Denies headaches  Denies confusion and denies hallucinations    Admits to being in well health today      Physical Assessment   We did not connect via video  Patient was alert and orientated times 3  Mood appropriate  Thought appropriate    I heard no respiratory distress over the phone today        Sabiha Kennedy is a 58-year-old male who was referred to SOC for presurgery optimization and SOC anemia review.  Brent explains back in December 2023 he went to the hospital was diagnosed with a heart attack.  He was started on blood thinners for his heart treatment and then he developed a GI bleed.  Further investigation of the GI bleed led to the diagnosis of a colonic mass/colon CA.  He is electing to have it removed.      I met with Brent over the telephone.  We did not connect via video.  He was at work.   He was offered a live visit in the SOC and declined.  States is too far for him to drive and he is working.  He was pleasant and a pleasure to care for.    He admits to being in good health today.  Denies any new developments in his health.  Denies chest pain.  Denies shortness of breath.  METS is 9.  He is currently getting blood work weekly.  He remains anemic.  Last hemoglobin level was 8.6.  He is getting a total of 3 doses of presurgery IV iron.  He already received 2 doses and tolerated them well.  He needs to have 1 more dose.  This is scheduled.  He also received a total of 1 unit of packed red blood cells.  He will get repeat blood work after this.  Await repeat blood work.    TYRESE risk low.  SSI risk low.      He lives at home he has support.  He is independent with all ADLs.    As always we discussed having your BEST surgery, and BEST recovery.  Surgery goals reviewed today.      Breathing exercises   Patient was encouraged to begin lung exercises today.  This could be accomplished through deep breathing and cough exercises.    Eating/nutrition   Encouraged patient to increase oral protein intake prior to surgery.    This can be accomplished by consuming chicken, fish, tuna fish, cottage cheese, cheese, eggs, Greek yogurt, and protein shakes as needed.  I encouraged use of protein shakes such ENLIVE.  I also recommended making your own protein shakes with protein powder.   Sleep/Stress management  Patient was encouraged to rest their body prior to surgery.  Encouraged attempting to get 8 hours of sleep at night.  Avoid stress.  Avoid sick contacts.  Encouraged to find a relaxing hobby such as reading, meditation, listening to music.  Training exercises  Patient was encouraged to remain active as possible.  Today bilateral lower extremity generic exercises were taught for muscle strengthening and balance.  All exercises to be done sitting down.       Visit Time  Total Visit Duration: 25 MINUTES         [facility]  CONSULT       Patient has the ability to take their vital signs at home no  Allergies reviewed today   PMH reviewed today   Medications reviewed today     See Geriatric Assessment below...    METS: 9.25       As always we discussed having your BEST surgery, and BEST recovery.  Surgery goals reviewed today.      Breathing exercises   Patient was encouraged to begin lung exercises today.  This could be accomplished through deep breathing and cough exercises.     Eating/nutrition   Encouraged patient to increase oral protein intake prior to surgery.  This can be accomplished by consuming chicken, fish, tuna fish, cottage cheese, cheese, eggs, Greek yogurt, and protein shakes as needed.  I encouraged use of protein shakes such ENLIVE.  I also recommended making your own protein shakes with protein powder.     Sleep/Stress management  Patient was encouraged to rest their body prior to surgery.  Encouraged attempting to get 8 hours of sleep at night.  Avoid stress.  Avoid sick contacts.  Encouraged to find a relaxing hobby such as reading, meditation, listening to music.  Training exercises  Patient was encouraged to remain active as possible.  Today bilateral lower extremity generic exercises were taught for muscle strengthening and balance.  All exercises to be done sitting down.

## 2024-06-03 ENCOUNTER — HOSPITAL ENCOUNTER (OUTPATIENT)
Dept: INFUSION CENTER | Facility: HOSPITAL | Age: 59
Discharge: HOME | End: 2024-06-03
Attending: INTERNAL MEDICINE
Payer: COMMERCIAL

## 2024-06-03 VITALS
OXYGEN SATURATION: 98 % | DIASTOLIC BLOOD PRESSURE: 56 MMHG | HEART RATE: 68 BPM | RESPIRATION RATE: 16 BRPM | SYSTOLIC BLOOD PRESSURE: 109 MMHG | TEMPERATURE: 96.8 F

## 2024-06-03 DIAGNOSIS — D62 ACUTE BLOOD LOSS ANEMIA: Primary | ICD-10-CM

## 2024-06-03 DIAGNOSIS — D50.0 IRON DEFICIENCY ANEMIA DUE TO CHRONIC BLOOD LOSS: ICD-10-CM

## 2024-06-03 DIAGNOSIS — C80.1 ADENOCARCINOMA (HCC): ICD-10-CM

## 2024-06-03 RX ORDER — SODIUM CHLORIDE 9 MG/ML
20 INJECTION, SOLUTION INTRAVENOUS ONCE
Status: CANCELLED | OUTPATIENT
Start: 2024-06-06

## 2024-06-03 RX ORDER — SODIUM CHLORIDE 9 MG/ML
20 INJECTION, SOLUTION INTRAVENOUS ONCE
Status: COMPLETED | OUTPATIENT
Start: 2024-06-03 | End: 2024-06-03

## 2024-06-03 RX ADMIN — SODIUM CHLORIDE 20 ML/HR: 0.9 INJECTION, SOLUTION INTRAVENOUS at 14:01

## 2024-06-03 RX ADMIN — IRON SUCROSE 300 MG: 20 INJECTION, SOLUTION INTRAVENOUS at 14:02

## 2024-06-03 NOTE — PROGRESS NOTES
Pt tolerated last ordered Venofer infusion well without complications. PIV removed without incident.     Brent Hall does not need a f/u infusion appt at this time.     AVS declined by Brent Hall.    Pt discharged off unit in stable condition accompanied by wife.

## 2024-06-03 NOTE — PLAN OF CARE
Problem: Potential for Falls  Goal: Patient will remain free of falls  Description: INTERVENTIONS:  - Educate patient/family on patient safety including physical limitations  - Instruct patient to call for assistance with activity   - Consult OT/PT to assist with strengthening/mobility   - Keep Call bell within reach  - Keep bed low and locked with side rails adjusted as appropriate  - Keep care items and personal belongings within reach  - Initiate and maintain comfort rounds  - Make Fall Risk Sign visible to staff  - Consider moving patient to room near nurses station  Outcome: Progressing     Problem: INFECTION - ADULT  Goal: Absence or prevention of progression during hospitalization  Description: INTERVENTIONS:  - Assess and monitor for signs and symptoms of infection  - Monitor lab/diagnostic results  - Monitor all insertion sites, i.e. indwelling lines, tubes, and drains  - Monitor endotracheal if appropriate and nasal secretions for changes in amount and color  - [place] appropriate cooling/warming therapies per order  - Administer medications as ordered  - Instruct and encourage patient and family to use good hand hygiene technique  - Identify and instruct in appropriate isolation precautions for identified infection/condition  Outcome: Progressing     Problem: Knowledge Deficit  Goal: Patient/family/caregiver demonstrates understanding of disease process, treatment plan, medications, and discharge instructions  Description: Complete learning assessment and assess knowledge base.  Interventions:  - Provide teaching at level of understanding  - Provide teaching via preferred learning methods  Outcome: Progressing

## 2024-06-06 ENCOUNTER — APPOINTMENT (OUTPATIENT)
Dept: LAB | Facility: HOSPITAL | Age: 59
End: 2024-06-06
Payer: COMMERCIAL

## 2024-06-06 DIAGNOSIS — C18.2 PRIMARY ADENOCARCINOMA OF ASCENDING COLON (HCC): ICD-10-CM

## 2024-06-06 DIAGNOSIS — R60.0 LOCALIZED EDEMA: ICD-10-CM

## 2024-06-06 DIAGNOSIS — I25.10 CORONARY ARTERY DISEASE INVOLVING NATIVE CORONARY ARTERY OF NATIVE HEART WITHOUT ANGINA PECTORIS: ICD-10-CM

## 2024-06-06 DIAGNOSIS — D62 ACUTE BLOOD LOSS ANEMIA: ICD-10-CM

## 2024-06-06 LAB
BASOPHILS # BLD AUTO: 0.03 THOUSANDS/ÂΜL (ref 0–0.1)
BASOPHILS NFR BLD AUTO: 1 % (ref 0–1)
EOSINOPHIL # BLD AUTO: 0.14 THOUSAND/ÂΜL (ref 0–0.61)
EOSINOPHIL NFR BLD AUTO: 3 % (ref 0–6)
ERYTHROCYTE [DISTWIDTH] IN BLOOD BY AUTOMATED COUNT: 27.8 % (ref 11.6–15.1)
HCT VFR BLD AUTO: 36.8 % (ref 36.5–49.3)
HGB BLD-MCNC: 10.2 G/DL (ref 12–17)
IMM GRANULOCYTES # BLD AUTO: 0.01 THOUSAND/UL (ref 0–0.2)
IMM GRANULOCYTES NFR BLD AUTO: 0 % (ref 0–2)
LYMPHOCYTES # BLD AUTO: 1.37 THOUSANDS/ÂΜL (ref 0.6–4.47)
LYMPHOCYTES NFR BLD AUTO: 30 % (ref 14–44)
MCH RBC QN AUTO: 21.3 PG (ref 26.8–34.3)
MCHC RBC AUTO-ENTMCNC: 27.7 G/DL (ref 31.4–37.4)
MCV RBC AUTO: 77 FL (ref 82–98)
MONOCYTES # BLD AUTO: 0.53 THOUSAND/ÂΜL (ref 0.17–1.22)
MONOCYTES NFR BLD AUTO: 12 % (ref 4–12)
NEUTROPHILS # BLD AUTO: 2.54 THOUSANDS/ÂΜL (ref 1.85–7.62)
NEUTS SEG NFR BLD AUTO: 54 % (ref 43–75)
NRBC BLD AUTO-RTO: 0 /100 WBCS
PLATELET # BLD AUTO: 252 THOUSANDS/UL (ref 149–390)
PMV BLD AUTO: 9.5 FL (ref 8.9–12.7)
RBC # BLD AUTO: 4.79 MILLION/UL (ref 3.88–5.62)
WBC # BLD AUTO: 4.62 THOUSAND/UL (ref 4.31–10.16)

## 2024-06-06 PROCEDURE — 36415 COLL VENOUS BLD VENIPUNCTURE: CPT

## 2024-06-06 PROCEDURE — 85025 COMPLETE CBC W/AUTO DIFF WBC: CPT

## 2024-06-06 NOTE — PRE-PROCEDURE INSTRUCTIONS
Pre-Surgery Instructions:   Medication Instructions    atorvastatin (LIPITOR) 80 mg tablet Take night before surgery    Fiasp FlexTouch 100 units/mL injection pen Hold day of surgery.    Lantus SoloStar 100 units/mL SOPN Take full dose night before surgery    metoprolol succinate (TOPROL-XL) 25 mg 24 hr tablet Take day of surgery.      Medication instructions for day surgery reviewed. Please use only a sip of water to take your instructed medications. Avoid all over the counter vitamins, supplements and NSAIDS for one week prior to surgery per anesthesia guidelines. Tylenol is ok to take as needed.     You will receive a call one business day prior to surgery with an arrival time and hospital directions. If your surgery is scheduled on a Monday, the hospital will be calling you on the Friday prior to your surgery. If you have not heard from anyone by 8pm, please call the hospital supervisor through the hospital  at [phone]. ([place] [phone] or [place] [phone]).    Do not eat or drink anything after midnight the night before your surgery, including candy, mints, lifesavers, or chewing gum. Do not drink alcohol 24hrs before your surgery. Try not to smoke at least 24hrs before your surgery.       Follow the pre surgery showering instructions as listed in the “My Surgical Experience Booklet” or otherwise provided by your surgeon's office. Do not use a blade to shave the surgical area 1 week before surgery. It is okay to use a clean electric clippers up to 24 hours before surgery. Do not apply any lotions, creams, including makeup, cologne, deodorant, or perfumes after showering on the day of your surgery. Do not use dry shampoo, hair spray, hair gel, or any type of hair products.     No contact lenses, eye make-up, or artificial eyelashes. Remove nail polish, including gel polish, and any artificial, gel, or acrylic nails if possible. Remove all jewelry including rings and body piercing jewelry.      Wear causal clothing that is easy to take on and off. Consider your type of surgery.    Keep any valuables, jewelry, piercings at home. Please bring any specially ordered equipment (sling, braces) if indicated.    Arrange for a responsible person to drive you to and from the hospital on the day of your surgery. Please confirm the visitor policy for the day of your procedure when you receive your phone call with an arrival time.     Call the surgeon's office with any new illnesses, exposures, or additional questions prior to surgery.    Please reference your “My Surgical Experience Booklet” for additional information to prepare for your upcoming surgery.

## 2024-06-13 ENCOUNTER — LAB REQUISITION (OUTPATIENT)
Dept: LAB | Facility: HOSPITAL | Age: 59
End: 2024-06-13
Payer: COMMERCIAL

## 2024-06-13 ENCOUNTER — DOCUMENTATION (OUTPATIENT)
Dept: HEMATOLOGY ONCOLOGY | Facility: CLINIC | Age: 59
End: 2024-06-13

## 2024-06-13 ENCOUNTER — APPOINTMENT (OUTPATIENT)
Dept: LAB | Facility: HOSPITAL | Age: 59
End: 2024-06-13
Payer: COMMERCIAL

## 2024-06-13 DIAGNOSIS — D62 ACUTE BLOOD LOSS ANEMIA: ICD-10-CM

## 2024-06-13 DIAGNOSIS — D62 ACUTE POSTHEMORRHAGIC ANEMIA: ICD-10-CM

## 2024-06-13 LAB
ABO GROUP BLD: NORMAL
BASOPHILS # BLD AUTO: 0.03 THOUSANDS/ÂΜL (ref 0–0.1)
BASOPHILS NFR BLD AUTO: 1 % (ref 0–1)
BLD GP AB SCN SERPL QL: NEGATIVE
EOSINOPHIL # BLD AUTO: 0.1 THOUSAND/ÂΜL (ref 0–0.61)
EOSINOPHIL NFR BLD AUTO: 2 % (ref 0–6)
ERYTHROCYTE [DISTWIDTH] IN BLOOD BY AUTOMATED COUNT: 27.4 % (ref 11.6–15.1)
HCT VFR BLD AUTO: 34.9 % (ref 36.5–49.3)
HGB BLD-MCNC: 10.2 G/DL (ref 12–17)
IMM GRANULOCYTES # BLD AUTO: 0.01 THOUSAND/UL (ref 0–0.2)
IMM GRANULOCYTES NFR BLD AUTO: 0 % (ref 0–2)
LYMPHOCYTES # BLD AUTO: 1.3 THOUSANDS/ÂΜL (ref 0.6–4.47)
LYMPHOCYTES NFR BLD AUTO: 29 % (ref 14–44)
MCH RBC QN AUTO: 22.5 PG (ref 26.8–34.3)
MCHC RBC AUTO-ENTMCNC: 29.2 G/DL (ref 31.4–37.4)
MCV RBC AUTO: 77 FL (ref 82–98)
MONOCYTES # BLD AUTO: 0.53 THOUSAND/ÂΜL (ref 0.17–1.22)
MONOCYTES NFR BLD AUTO: 12 % (ref 4–12)
NEUTROPHILS # BLD AUTO: 2.59 THOUSANDS/ÂΜL (ref 1.85–7.62)
NEUTS SEG NFR BLD AUTO: 56 % (ref 43–75)
NRBC BLD AUTO-RTO: 0 /100 WBCS
PLATELET # BLD AUTO: 194 THOUSANDS/UL (ref 149–390)
PMV BLD AUTO: 9.5 FL (ref 8.9–12.7)
RBC # BLD AUTO: 4.54 MILLION/UL (ref 3.88–5.62)
RH BLD: POSITIVE
SPECIMEN EXPIRATION DATE: NORMAL
WBC # BLD AUTO: 4.56 THOUSAND/UL (ref 4.31–10.16)

## 2024-06-13 PROCEDURE — 86901 BLOOD TYPING SEROLOGIC RH(D): CPT | Performed by: COLON & RECTAL SURGERY

## 2024-06-13 PROCEDURE — 86850 RBC ANTIBODY SCREEN: CPT | Performed by: COLON & RECTAL SURGERY

## 2024-06-13 PROCEDURE — 86900 BLOOD TYPING SEROLOGIC ABO: CPT | Performed by: COLON & RECTAL SURGERY

## 2024-06-13 PROCEDURE — 36415 COLL VENOUS BLD VENIPUNCTURE: CPT

## 2024-06-13 PROCEDURE — 85025 COMPLETE CBC W/AUTO DIFF WBC: CPT

## 2024-06-13 NOTE — PROGRESS NOTES
In-basket message [facility] from Dr[facility]. Chart reviewed and prep completed.

## 2024-06-17 ENCOUNTER — DOCUMENTATION (OUTPATIENT)
Dept: HEMATOLOGY ONCOLOGY | Facility: CLINIC | Age: 59
End: 2024-06-17

## 2024-06-20 ENCOUNTER — APPOINTMENT (OUTPATIENT)
Dept: LAB | Facility: HOSPITAL | Age: 59
End: 2024-06-20
Payer: COMMERCIAL

## 2024-06-20 DIAGNOSIS — D62 ACUTE BLOOD LOSS ANEMIA: ICD-10-CM

## 2024-06-20 LAB
BASOPHILS # BLD AUTO: 0.04 THOUSANDS/ÂΜL (ref 0–0.1)
BASOPHILS NFR BLD AUTO: 1 % (ref 0–1)
EOSINOPHIL # BLD AUTO: 0.11 THOUSAND/ÂΜL (ref 0–0.61)
EOSINOPHIL NFR BLD AUTO: 2 % (ref 0–6)
ERYTHROCYTE [DISTWIDTH] IN BLOOD BY AUTOMATED COUNT: 26.1 % (ref 11.6–15.1)
HCT VFR BLD AUTO: 37.9 % (ref 36.5–49.3)
HGB BLD-MCNC: 11.2 G/DL (ref 12–17)
IMM GRANULOCYTES # BLD AUTO: 0.01 THOUSAND/UL (ref 0–0.2)
IMM GRANULOCYTES NFR BLD AUTO: 0 % (ref 0–2)
LYMPHOCYTES # BLD AUTO: 1.48 THOUSANDS/ÂΜL (ref 0.6–4.47)
LYMPHOCYTES NFR BLD AUTO: 28 % (ref 14–44)
MCH RBC QN AUTO: 22.8 PG (ref 26.8–34.3)
MCHC RBC AUTO-ENTMCNC: 29.6 G/DL (ref 31.4–37.4)
MCV RBC AUTO: 77 FL (ref 82–98)
MONOCYTES # BLD AUTO: 0.64 THOUSAND/ÂΜL (ref 0.17–1.22)
MONOCYTES NFR BLD AUTO: 12 % (ref 4–12)
NEUTROPHILS # BLD AUTO: 3.01 THOUSANDS/ÂΜL (ref 1.85–7.62)
NEUTS SEG NFR BLD AUTO: 57 % (ref 43–75)
NRBC BLD AUTO-RTO: 0 /100 WBCS
PLATELET # BLD AUTO: 224 THOUSANDS/UL (ref 149–390)
PMV BLD AUTO: 9.9 FL (ref 8.9–12.7)
RBC # BLD AUTO: 4.91 MILLION/UL (ref 3.88–5.62)
WBC # BLD AUTO: 5.29 THOUSAND/UL (ref 4.31–10.16)

## 2024-06-20 PROCEDURE — 36415 COLL VENOUS BLD VENIPUNCTURE: CPT

## 2024-06-20 PROCEDURE — 85025 COMPLETE CBC W/AUTO DIFF WBC: CPT

## 2024-06-21 LAB
ABO GROUP BLD: NORMAL
BLD GP AB SCN SERPL QL: NEGATIVE
RH BLD: POSITIVE
SPECIMEN EXPIRATION DATE: NORMAL

## 2024-06-23 ENCOUNTER — ANESTHESIA EVENT (OUTPATIENT)
Dept: PERIOP | Facility: HOSPITAL | Age: 59
DRG: 331 | End: 2024-06-23
Payer: COMMERCIAL

## 2024-06-23 NOTE — ANESTHESIA PREPROCEDURE EVALUATION
Procedure:  RESECTION COLON RIGHT LAPAROSCOPIC W/ ROBOTICS (Abdomen)    Relevant Problems   CARDIO   (+) Coronary artery disease involving native coronary artery of native heart without angina pectoris   (+) Other hyperlipidemia      ENDO   (+) Type 2 diabetes mellitus without complication, with long-term current use of insulin (HCC)      GI/HEPATIC   (+) Gastroesophageal reflux disease without esophagitis      /RENAL   (+) Chronic kidney disease (CKD) stage G2/A2, mildly decreased glomerular filtration rate (GFR) between 60-89 mL/min/1.73 square meter and albuminuria creatinine ratio between  mg/g      HEMATOLOGY   (+) Acute blood loss anemia   (+) Iron deficiency anemia due to chronic blood loss      Digestive   (+) Melena      Oncology   (+) Adenocarcinoma (HCC)      Other   (+) Colonic mass   (+) History of ST elevation myocardial infarction (STEMI)   (+) Transaminitis     TTE 4/256/24:   Left Ventricle Left ventricle is normal in size with low normal systolic function.  Estimated LVEF is 50%.  Basal inferior wall is hypokinetic.   Aortic Valve Aortic valve is trileaflet with mild sclerosis.  No aortic stenosis.   Mitral Valve Mild nonspecific thickening of mitral valve leaflets.  Mild to moderate mitral regurgitation.   Pericardium Pericardium is structurally normal.  No pericardial pathology.     EKG 4/10/24: ST (TB=112), IMI    Cath  12/11/23:  Left Main   The vessel exhibits minimal luminal irregularities.      Left Anterior Descending   Mid LAD lesion is 30% stenosed.      Left Circumflex   Mid Cx lesion is 50% stenosed.      Right Coronary Artery   Mid RCA lesion is 40% stenosed.      Intervention   No interventions have been documented.       Physical Exam    Airway    Mallampati score: II  TM Distance: >3 FB  Neck ROM: full     Dental       Cardiovascular      Pulmonary      Other Findings    Anesthesia Plan  ASA Score- 3     Anesthesia Type- general with ASA Monitors.         Additional  Monitors: arterial line.    Airway Plan: ETT.           Plan Factors-    Chart reviewed. EKG reviewed. Imaging results reviewed. Existing labs reviewed. Patient summary reviewed.    Patient is not a current smoker.  Patient did not smoke on day of surgery.            Induction- intravenous.    Postoperative Plan- Plan for postoperative opioid use.     Perioperative Resuscitation Plan - Level 1 - Full Code.       Informed Consent- Anesthetic plan and risks discussed with patient.  I personally reviewed this patient with the CRNA. Discussed and agreed on the Anesthesia Plan with the CRNA..

## 2024-06-24 ENCOUNTER — HOSPITAL ENCOUNTER (INPATIENT)
Facility: HOSPITAL | Age: 59
LOS: 2 days | Discharge: HOME | DRG: 331 | End: 2024-06-26
Attending: COLON & RECTAL SURGERY | Admitting: COLON & RECTAL SURGERY
Payer: COMMERCIAL

## 2024-06-24 ENCOUNTER — ANESTHESIA (OUTPATIENT)
Dept: PERIOP | Facility: HOSPITAL | Age: 59
DRG: 331 | End: 2024-06-24
Payer: COMMERCIAL

## 2024-06-24 DIAGNOSIS — K63.89 COLONIC MASS: Primary | ICD-10-CM

## 2024-06-24 DIAGNOSIS — I25.10 CORONARY ARTERY DISEASE INVOLVING NATIVE CORONARY ARTERY OF NATIVE HEART WITHOUT ANGINA PECTORIS: ICD-10-CM

## 2024-06-24 DIAGNOSIS — Z79.4 TYPE 2 DIABETES MELLITUS WITHOUT COMPLICATION, WITH LONG-TERM CURRENT USE OF INSULIN (HCC): ICD-10-CM

## 2024-06-24 DIAGNOSIS — E11.9 TYPE 2 DIABETES MELLITUS WITHOUT COMPLICATION, WITH LONG-TERM CURRENT USE OF INSULIN (HCC): ICD-10-CM

## 2024-06-24 DIAGNOSIS — C80.1 ADENOCARCINOMA (HCC): ICD-10-CM

## 2024-06-24 DIAGNOSIS — C18.2 PRIMARY ADENOCARCINOMA OF ASCENDING COLON (HCC): ICD-10-CM

## 2024-06-24 LAB
ABO GROUP BLD: NORMAL
BLD GP AB SCN SERPL QL: NEGATIVE
EST. AVERAGE GLUCOSE BLD GHB EST-MCNC: 126 MG/DL
GLUCOSE SERPL-MCNC: 160 MG/DL (ref 65–140)
GLUCOSE SERPL-MCNC: 179 MG/DL (ref 65–140)
GLUCOSE SERPL-MCNC: 221 MG/DL (ref 65–140)
GLUCOSE SERPL-MCNC: 227 MG/DL (ref 65–140)
HBA1C MFR BLD: 6 %
PLATELET # BLD AUTO: 149 THOUSANDS/UL (ref 149–390)
PMV BLD AUTO: 9 FL (ref 8.9–12.7)
RH BLD: POSITIVE
SPECIMEN EXPIRATION DATE: NORMAL

## 2024-06-24 PROCEDURE — 88309 TISSUE EXAM BY PATHOLOGIST: CPT | Performed by: PATHOLOGY

## 2024-06-24 PROCEDURE — 86850 RBC ANTIBODY SCREEN: CPT | Performed by: COLON & RECTAL SURGERY

## 2024-06-24 PROCEDURE — B41B1ZZ FLUOROSCOPY OF OTHER INTRA-ABDOMINAL ARTERIES USING LOW OSMOLAR CONTRAST: ICD-10-PCS | Performed by: COLON & RECTAL SURGERY

## 2024-06-24 PROCEDURE — 82948 REAGENT STRIP/BLOOD GLUCOSE: CPT

## 2024-06-24 PROCEDURE — 99222 1ST HOSP IP/OBS MODERATE 55: CPT | Performed by: INTERNAL MEDICINE

## 2024-06-24 PROCEDURE — S2900 ROBOTIC SURGICAL SYSTEM: HCPCS | Performed by: COLON & RECTAL SURGERY

## 2024-06-24 PROCEDURE — 44205 LAP COLECTOMY PART W/ILEUM: CPT | Performed by: COLON & RECTAL SURGERY

## 2024-06-24 PROCEDURE — 85049 AUTOMATED PLATELET COUNT: CPT | Performed by: PHYSICIAN ASSISTANT

## 2024-06-24 PROCEDURE — 86901 BLOOD TYPING SEROLOGIC RH(D): CPT | Performed by: COLON & RECTAL SURGERY

## 2024-06-24 PROCEDURE — C9113 INJ PANTOPRAZOLE SODIUM, VIA: HCPCS | Performed by: PHYSICIAN ASSISTANT

## 2024-06-24 PROCEDURE — 94760 N-INVAS EAR/PLS OXIMETRY 1: CPT

## 2024-06-24 PROCEDURE — 0DTF4ZZ RESECTION OF RIGHT LARGE INTESTINE, PERCUTANEOUS ENDOSCOPIC APPROACH: ICD-10-PCS | Performed by: COLON & RECTAL SURGERY

## 2024-06-24 PROCEDURE — 44205 LAP COLECTOMY PART W/ILEUM: CPT | Performed by: PHYSICIAN ASSISTANT

## 2024-06-24 PROCEDURE — 8E0W4CZ ROBOTIC ASSISTED PROCEDURE OF TRUNK REGION, PERCUTANEOUS ENDOSCOPIC APPROACH: ICD-10-PCS | Performed by: COLON & RECTAL SURGERY

## 2024-06-24 PROCEDURE — 86900 BLOOD TYPING SEROLOGIC ABO: CPT | Performed by: COLON & RECTAL SURGERY

## 2024-06-24 PROCEDURE — 83036 HEMOGLOBIN GLYCOSYLATED A1C: CPT | Performed by: PHYSICIAN ASSISTANT

## 2024-06-24 RX ORDER — HEPARIN SODIUM 5000 [USP'U]/ML
5000 INJECTION, SOLUTION INTRAVENOUS; SUBCUTANEOUS ONCE
Status: COMPLETED | OUTPATIENT
Start: 2024-06-24 | End: 2024-06-24

## 2024-06-24 RX ORDER — INSULIN GLARGINE 100 [IU]/ML
20 INJECTION, SOLUTION SUBCUTANEOUS
Status: DISCONTINUED | OUTPATIENT
Start: 2024-06-24 | End: 2024-06-26 | Stop reason: HOSPADM

## 2024-06-24 RX ORDER — INDOCYANINE GREEN AND WATER 25 MG
KIT INJECTION AS NEEDED
Status: DISCONTINUED | OUTPATIENT
Start: 2024-06-24 | End: 2024-06-24

## 2024-06-24 RX ORDER — BISACODYL 5 MG/1
10 TABLET, DELAYED RELEASE ORAL 2 TIMES DAILY
Status: DISCONTINUED | OUTPATIENT
Start: 2024-06-24 | End: 2024-06-24 | Stop reason: HOSPADM

## 2024-06-24 RX ORDER — CEFAZOLIN SODIUM 2 G/50ML
2000 SOLUTION INTRAVENOUS ONCE
Status: COMPLETED | OUTPATIENT
Start: 2024-06-24 | End: 2024-06-24

## 2024-06-24 RX ORDER — METRONIDAZOLE 500 MG/100ML
500 INJECTION, SOLUTION INTRAVENOUS ONCE
Status: COMPLETED | OUTPATIENT
Start: 2024-06-24 | End: 2024-06-24

## 2024-06-24 RX ORDER — HYDROMORPHONE HCL/PF 1 MG/ML
0.5 SYRINGE (ML) INJECTION
Status: DISCONTINUED | OUTPATIENT
Start: 2024-06-24 | End: 2024-06-24 | Stop reason: HOSPADM

## 2024-06-24 RX ORDER — ONDANSETRON 2 MG/ML
INJECTION INTRAMUSCULAR; INTRAVENOUS AS NEEDED
Status: DISCONTINUED | OUTPATIENT
Start: 2024-06-24 | End: 2024-06-24

## 2024-06-24 RX ORDER — PHENYLEPHRINE HCL IN 0.9% NACL 1 MG/10 ML
SYRINGE (ML) INTRAVENOUS AS NEEDED
Status: DISCONTINUED | OUTPATIENT
Start: 2024-06-24 | End: 2024-06-24

## 2024-06-24 RX ORDER — DIPHENHYDRAMINE HYDROCHLORIDE 50 MG/ML
25 INJECTION INTRAMUSCULAR; INTRAVENOUS EVERY 6 HOURS PRN
Status: DISCONTINUED | OUTPATIENT
Start: 2024-06-24 | End: 2024-06-26 | Stop reason: HOSPADM

## 2024-06-24 RX ORDER — POLYETHYLENE GLYCOL 3350 17 G/17G
255 POWDER, FOR SOLUTION ORAL ONCE
Status: DISCONTINUED | OUTPATIENT
Start: 2024-06-24 | End: 2024-06-24 | Stop reason: HOSPADM

## 2024-06-24 RX ORDER — KETAMINE HCL IN NACL, ISO-OSM 100MG/10ML
SYRINGE (ML) INJECTION AS NEEDED
Status: DISCONTINUED | OUTPATIENT
Start: 2024-06-24 | End: 2024-06-24

## 2024-06-24 RX ORDER — METOPROLOL SUCCINATE 25 MG/1
25 TABLET, EXTENDED RELEASE ORAL DAILY
Status: DISCONTINUED | OUTPATIENT
Start: 2024-06-25 | End: 2024-06-26 | Stop reason: HOSPADM

## 2024-06-24 RX ORDER — NITROGLYCERIN 0.4 MG/1
0.4 TABLET SUBLINGUAL
Status: DISCONTINUED | OUTPATIENT
Start: 2024-06-24 | End: 2024-06-26 | Stop reason: HOSPADM

## 2024-06-24 RX ORDER — ONDANSETRON 2 MG/ML
4 INJECTION INTRAMUSCULAR; INTRAVENOUS EVERY 6 HOURS PRN
Status: DISCONTINUED | OUTPATIENT
Start: 2024-06-24 | End: 2024-06-26 | Stop reason: HOSPADM

## 2024-06-24 RX ORDER — LIDOCAINE HYDROCHLORIDE 10 MG/ML
INJECTION, SOLUTION EPIDURAL; INFILTRATION; INTRACAUDAL; PERINEURAL AS NEEDED
Status: DISCONTINUED | OUTPATIENT
Start: 2024-06-24 | End: 2024-06-24

## 2024-06-24 RX ORDER — SODIUM CHLORIDE 9 MG/ML
75 INJECTION, SOLUTION INTRAVENOUS CONTINUOUS
Status: DISCONTINUED | OUTPATIENT
Start: 2024-06-24 | End: 2024-06-24

## 2024-06-24 RX ORDER — ATORVASTATIN CALCIUM 80 MG/1
80 TABLET, FILM COATED ORAL
Status: DISCONTINUED | OUTPATIENT
Start: 2024-06-24 | End: 2024-06-26 | Stop reason: HOSPADM

## 2024-06-24 RX ORDER — HYDROMORPHONE HCL/PF 1 MG/ML
0.5 SYRINGE (ML) INJECTION
Status: DISCONTINUED | OUTPATIENT
Start: 2024-06-24 | End: 2024-06-26 | Stop reason: HOSPADM

## 2024-06-24 RX ORDER — ONDANSETRON 2 MG/ML
4 INJECTION INTRAMUSCULAR; INTRAVENOUS ONCE AS NEEDED
Status: DISCONTINUED | OUTPATIENT
Start: 2024-06-24 | End: 2024-06-24 | Stop reason: HOSPADM

## 2024-06-24 RX ORDER — ACETAMINOPHEN 325 MG/1
975 TABLET ORAL ONCE
Status: COMPLETED | OUTPATIENT
Start: 2024-06-24 | End: 2024-06-24

## 2024-06-24 RX ORDER — INSULIN LISPRO 100 [IU]/ML
1-6 INJECTION, SOLUTION INTRAVENOUS; SUBCUTANEOUS
Status: DISCONTINUED | OUTPATIENT
Start: 2024-06-24 | End: 2024-06-26 | Stop reason: HOSPADM

## 2024-06-24 RX ORDER — ACETAMINOPHEN 10 MG/ML
1000 INJECTION, SOLUTION INTRAVENOUS EVERY 6 HOURS SCHEDULED
Status: DISCONTINUED | OUTPATIENT
Start: 2024-06-24 | End: 2024-06-24

## 2024-06-24 RX ORDER — MAGNESIUM HYDROXIDE/ALUMINUM HYDROXICE/SIMETHICONE 120; 1200; 1200 MG/30ML; MG/30ML; MG/30ML
30 SUSPENSION ORAL EVERY 6 HOURS PRN
Status: DISCONTINUED | OUTPATIENT
Start: 2024-06-24 | End: 2024-06-26 | Stop reason: HOSPADM

## 2024-06-24 RX ORDER — PANTOPRAZOLE SODIUM 40 MG/10ML
40 INJECTION, POWDER, LYOPHILIZED, FOR SOLUTION INTRAVENOUS
Status: DISCONTINUED | OUTPATIENT
Start: 2024-06-24 | End: 2024-06-26

## 2024-06-24 RX ORDER — PANTOPRAZOLE SODIUM 20 MG/1
20 TABLET, DELAYED RELEASE ORAL
Status: DISCONTINUED | OUTPATIENT
Start: 2024-06-25 | End: 2024-06-24

## 2024-06-24 RX ORDER — FENTANYL CITRATE/PF 50 MCG/ML
50 SYRINGE (ML) INJECTION
Status: DISCONTINUED | OUTPATIENT
Start: 2024-06-24 | End: 2024-06-24 | Stop reason: HOSPADM

## 2024-06-24 RX ORDER — DOCUSATE SODIUM 100 MG/1
100 CAPSULE, LIQUID FILLED ORAL 2 TIMES DAILY
Status: DISCONTINUED | OUTPATIENT
Start: 2024-06-24 | End: 2024-06-26 | Stop reason: HOSPADM

## 2024-06-24 RX ORDER — ROCURONIUM BROMIDE 10 MG/ML
INJECTION, SOLUTION INTRAVENOUS AS NEEDED
Status: DISCONTINUED | OUTPATIENT
Start: 2024-06-24 | End: 2024-06-24

## 2024-06-24 RX ORDER — HEPARIN SODIUM 5000 [USP'U]/ML
5000 INJECTION, SOLUTION INTRAVENOUS; SUBCUTANEOUS EVERY 8 HOURS SCHEDULED
Status: DISCONTINUED | OUTPATIENT
Start: 2024-06-24 | End: 2024-06-26

## 2024-06-24 RX ORDER — MIDAZOLAM HYDROCHLORIDE 2 MG/2ML
INJECTION, SOLUTION INTRAMUSCULAR; INTRAVENOUS AS NEEDED
Status: DISCONTINUED | OUTPATIENT
Start: 2024-06-24 | End: 2024-06-24

## 2024-06-24 RX ORDER — HYDROMORPHONE HCL/PF 1 MG/ML
SYRINGE (ML) INJECTION AS NEEDED
Status: DISCONTINUED | OUTPATIENT
Start: 2024-06-24 | End: 2024-06-24

## 2024-06-24 RX ORDER — ACETAMINOPHEN 325 MG/1
TABLET ORAL
Status: DISPENSED
Start: 2024-06-24 | End: 2024-06-25

## 2024-06-24 RX ORDER — FENTANYL CITRATE 50 UG/ML
INJECTION, SOLUTION INTRAMUSCULAR; INTRAVENOUS AS NEEDED
Status: DISCONTINUED | OUTPATIENT
Start: 2024-06-24 | End: 2024-06-24

## 2024-06-24 RX ORDER — SODIUM CHLORIDE, SODIUM LACTATE, POTASSIUM CHLORIDE, CALCIUM CHLORIDE 600; 310; 30; 20 MG/100ML; MG/100ML; MG/100ML; MG/100ML
INJECTION, SOLUTION INTRAVENOUS CONTINUOUS PRN
Status: DISCONTINUED | OUTPATIENT
Start: 2024-06-24 | End: 2024-06-24

## 2024-06-24 RX ORDER — INSULIN LISPRO 100 [IU]/ML
1-6 INJECTION, SOLUTION INTRAVENOUS; SUBCUTANEOUS
Status: DISCONTINUED | OUTPATIENT
Start: 2024-06-24 | End: 2024-06-24

## 2024-06-24 RX ORDER — PROPOFOL 10 MG/ML
INJECTION, EMULSION INTRAVENOUS AS NEEDED
Status: DISCONTINUED | OUTPATIENT
Start: 2024-06-24 | End: 2024-06-24

## 2024-06-24 RX ORDER — ACETAMINOPHEN 325 MG/1
650 TABLET ORAL EVERY 6 HOURS SCHEDULED
Status: DISCONTINUED | OUTPATIENT
Start: 2024-06-24 | End: 2024-06-26

## 2024-06-24 RX ORDER — SODIUM CHLORIDE, SODIUM LACTATE, POTASSIUM CHLORIDE, CALCIUM CHLORIDE 600; 310; 30; 20 MG/100ML; MG/100ML; MG/100ML; MG/100ML
75 INJECTION, SOLUTION INTRAVENOUS CONTINUOUS
Status: DISCONTINUED | OUTPATIENT
Start: 2024-06-24 | End: 2024-06-26

## 2024-06-24 RX ORDER — INSULIN LISPRO 100 [IU]/ML
1-5 INJECTION, SOLUTION INTRAVENOUS; SUBCUTANEOUS
Status: DISCONTINUED | OUTPATIENT
Start: 2024-06-24 | End: 2024-06-26 | Stop reason: HOSPADM

## 2024-06-24 RX ORDER — SODIUM CHLORIDE 9 MG/ML
INJECTION, SOLUTION INTRAVENOUS CONTINUOUS PRN
Status: DISCONTINUED | OUTPATIENT
Start: 2024-06-24 | End: 2024-06-24

## 2024-06-24 RX ORDER — DEXAMETHASONE SODIUM PHOSPHATE 10 MG/ML
INJECTION, SOLUTION INTRAMUSCULAR; INTRAVENOUS AS NEEDED
Status: DISCONTINUED | OUTPATIENT
Start: 2024-06-24 | End: 2024-06-24

## 2024-06-24 RX ORDER — CALCIUM CARBONATE 500 MG/1
1000 TABLET, CHEWABLE ORAL DAILY PRN
Status: DISCONTINUED | OUTPATIENT
Start: 2024-06-24 | End: 2024-06-26 | Stop reason: HOSPADM

## 2024-06-24 RX ADMIN — FENTANYL CITRATE 50 MCG: 50 INJECTION INTRAMUSCULAR; INTRAVENOUS at 07:36

## 2024-06-24 RX ADMIN — ROCURONIUM 10 MG: 50 INJECTION, SOLUTION INTRAVENOUS at 09:40

## 2024-06-24 RX ADMIN — ROCURONIUM 50 MG: 50 INJECTION, SOLUTION INTRAVENOUS at 07:36

## 2024-06-24 RX ADMIN — Medication 100 MCG: at 07:46

## 2024-06-24 RX ADMIN — METRONIDAZOLE: 500 INJECTION, SOLUTION INTRAVENOUS at 07:56

## 2024-06-24 RX ADMIN — INSULIN HUMAN 5 UNITS: 100 INJECTION, SOLUTION PARENTERAL at 11:31

## 2024-06-24 RX ADMIN — FENTANYL CITRATE 50 MCG: 50 INJECTION INTRAMUSCULAR; INTRAVENOUS at 08:08

## 2024-06-24 RX ADMIN — ROCURONIUM 20 MG: 50 INJECTION, SOLUTION INTRAVENOUS at 08:56

## 2024-06-24 RX ADMIN — SODIUM CHLORIDE: 0.9 INJECTION, SOLUTION INTRAVENOUS at 09:31

## 2024-06-24 RX ADMIN — ONDANSETRON 4 MG: 2 INJECTION INTRAMUSCULAR; INTRAVENOUS at 07:26

## 2024-06-24 RX ADMIN — INDOCYANINE GREEN AND WATER 7.5 MG: KIT at 09:52

## 2024-06-24 RX ADMIN — HEPARIN SODIUM 5000 UNITS: 5000 INJECTION INTRAVENOUS; SUBCUTANEOUS at 06:56

## 2024-06-24 RX ADMIN — ONDANSETRON 4 MG: 2 INJECTION INTRAMUSCULAR; INTRAVENOUS at 21:15

## 2024-06-24 RX ADMIN — INSULIN GLARGINE 20 UNITS: 100 INJECTION, SOLUTION SUBCUTANEOUS at 21:18

## 2024-06-24 RX ADMIN — SODIUM CHLORIDE, SODIUM LACTATE, POTASSIUM CHLORIDE, AND CALCIUM CHLORIDE 100 ML/HR: .6; .31; .03; .02 INJECTION, SOLUTION INTRAVENOUS at 21:23

## 2024-06-24 RX ADMIN — MIDAZOLAM 2 MG: 1 INJECTION INTRAMUSCULAR; INTRAVENOUS at 07:26

## 2024-06-24 RX ADMIN — LIDOCAINE HYDROCHLORIDE 50 MG: 10 INJECTION, SOLUTION EPIDURAL; INFILTRATION; INTRACAUDAL; PERINEURAL at 07:36

## 2024-06-24 RX ADMIN — Medication 100 MCG: at 08:00

## 2024-06-24 RX ADMIN — Medication: at 11:15

## 2024-06-24 RX ADMIN — Medication 30 MG: at 07:36

## 2024-06-24 RX ADMIN — HYDROMORPHONE HYDROCHLORIDE 0.5 MG: 1 INJECTION, SOLUTION INTRAMUSCULAR; INTRAVENOUS; SUBCUTANEOUS at 10:19

## 2024-06-24 RX ADMIN — INSULIN LISPRO 1 UNITS: 100 INJECTION, SOLUTION INTRAVENOUS; SUBCUTANEOUS at 21:19

## 2024-06-24 RX ADMIN — ACETAMINOPHEN 1000 MG: 10 INJECTION INTRAVENOUS at 13:49

## 2024-06-24 RX ADMIN — HEPARIN SODIUM 5000 UNITS: 5000 INJECTION INTRAVENOUS; SUBCUTANEOUS at 21:18

## 2024-06-24 RX ADMIN — HYDROMORPHONE HYDROCHLORIDE 0.5 MG: 1 INJECTION, SOLUTION INTRAMUSCULAR; INTRAVENOUS; SUBCUTANEOUS at 13:13

## 2024-06-24 RX ADMIN — SODIUM CHLORIDE, SODIUM LACTATE, POTASSIUM CHLORIDE, AND CALCIUM CHLORIDE 75 ML/HR: .6; .31; .03; .02 INJECTION, SOLUTION INTRAVENOUS at 11:15

## 2024-06-24 RX ADMIN — HYDROMORPHONE HYDROCHLORIDE 0.5 MG: 1 INJECTION, SOLUTION INTRAMUSCULAR; INTRAVENOUS; SUBCUTANEOUS at 09:40

## 2024-06-24 RX ADMIN — INDOCYANINE GREEN AND WATER 7.5 MG: KIT at 09:28

## 2024-06-24 RX ADMIN — SODIUM CHLORIDE: 0.9 INJECTION, SOLUTION INTRAVENOUS at 07:37

## 2024-06-24 RX ADMIN — SUGAMMADEX 200 MG: 100 INJECTION, SOLUTION INTRAVENOUS at 10:15

## 2024-06-24 RX ADMIN — Medication 10 MG: at 09:31

## 2024-06-24 RX ADMIN — INSULIN LISPRO 2 UNITS: 100 INJECTION, SOLUTION INTRAVENOUS; SUBCUTANEOUS at 17:09

## 2024-06-24 RX ADMIN — ACETAMINOPHEN 975 MG: 325 TABLET, FILM COATED ORAL at 06:54

## 2024-06-24 RX ADMIN — PANTOPRAZOLE SODIUM 40 MG: 40 INJECTION, POWDER, FOR SOLUTION INTRAVENOUS at 13:13

## 2024-06-24 RX ADMIN — PROPOFOL 200 MG: 10 INJECTION, EMULSION INTRAVENOUS at 07:36

## 2024-06-24 RX ADMIN — SODIUM CHLORIDE, SODIUM LACTATE, POTASSIUM CHLORIDE, AND CALCIUM CHLORIDE: .6; .31; .03; .02 INJECTION, SOLUTION INTRAVENOUS at 09:51

## 2024-06-24 RX ADMIN — CEFAZOLIN SODIUM 2000 MG: 2 SOLUTION INTRAVENOUS at 07:40

## 2024-06-24 RX ADMIN — DEXAMETHASONE SODIUM PHOSPHATE 10 MG: 10 INJECTION, SOLUTION INTRAMUSCULAR; INTRAVENOUS at 07:56

## 2024-06-24 RX ADMIN — Medication 100 MCG: at 08:11

## 2024-06-24 RX ADMIN — ONDANSETRON 4 MG: 2 INJECTION INTRAMUSCULAR; INTRAVENOUS at 13:48

## 2024-06-24 RX ADMIN — SODIUM CHLORIDE, SODIUM LACTATE, POTASSIUM CHLORIDE, AND CALCIUM CHLORIDE: .6; .31; .03; .02 INJECTION, SOLUTION INTRAVENOUS at 07:17

## 2024-06-24 RX ADMIN — HYDROMORPHONE HYDROCHLORIDE 0.5 MG: 1 INJECTION, SOLUTION INTRAMUSCULAR; INTRAVENOUS; SUBCUTANEOUS at 11:54

## 2024-06-24 RX ADMIN — EPINEPHRINE 1 MCG/MIN: 1 INJECTION, SOLUTION, CONCENTRATE INTRAVENOUS at 08:01

## 2024-06-24 RX ADMIN — Medication 10 MG: at 08:26

## 2024-06-24 NOTE — QUICK NOTE
Post Op Check:    58 y.o. M now POD #0 s/p Diagnostic lap, robotic right hemicolectomy, intra-op fluorescence angiography    Patient states he has some nausea and has spit up a bit. Presumed to be secondary to PONV. Patient has not had any clear liquid intake yet. The patient denied any chest pain, shortness of breath, fevers, chills. Currently on 2.5L NC saturating greater than 92%. Adequate urine output in the cohen. No bowel movements and no flatus yet.    Vitals:    06/24/24 1500   BP: 128/79   Pulse: 73   Resp: 18   Temp: 98.6 °F (37 °C)   SpO2: 94%       General: NAD  HENT: NCAT MMM  Neck: supple, no JVD  CV: nl rate  Lungs: nl wob. No resp distress  ABD: Soft, appropriately tender, nondistended. Incisions are clean/dry/intact.   Extrem: No CCE  Neuro: AAOx3     Plan:  Diet Surgical; Diabetic CL Liquids; No Carbonation  Continue IV fluids  Continue PCA  Continue Cohen  Pain and nausea control PRN  Multimodal pain control  DVT prophylaxis  Encourage IS  Encourage out of bed and ambulation    Yeyo Menezes MD  General Surgery Resident

## 2024-06-24 NOTE — PROGRESS NOTES
"Progress Note  Brent Hall 58 y.o. male MRN: [id number]  Unit/Bed#: [facility] 512-01 Encounter: [id number]    Assessment  58M s/p 6/24 dxlap, robotic R hemicolectomy, intra-op fluorescence angiography.    Plan  - maintain diabetic CLD as tolerated  - mIVF@100  - d/c Sandra  - d/c Red  - maintain PCA  - appreciate endo recs  - DVT ppx    Subjective/Objective   NAOE. Reports lower abdominal pain. Did not tolerate CLD, had n/v when tried. Still having nausea this morning. No BM, no flatus. Red removed 30 minutes ago, has not voided yet since. Walking.    VSS on RA.  /77 (BP Location: Right arm)   Pulse 64   Temp 98 °F (36.7 °C) (Oral)   Resp 20   Ht 5' 10\" (1.778 m)   Wt 99.8 kg (220 lb)   SpO2 98%   BMI 31.57 kg/m²     Physical Exam:  General: NAD  CV: nl rate  Lungs: nl wob. No resp distress.  ABD: Soft, ND, R sided tenderness, CDI  Extrem: No CCE    UOP: 820cc    Recent Labs     06/24/24  1452 06/25/24  0511   WBC  --  8.28   HGB  --  9.4*    170   SODIUM  --  140   K  --  4.0   CL  --  106   CO2  --  24   BUN  --  16   CREATININE  --  0.88   GLUC  --  131   CALCIUM  --  8.2*   MG  --  1.7*   PHOS  --  4.2   EGFR  --  94         "

## 2024-06-24 NOTE — ANESTHESIA PROCEDURE NOTES
Anesthesia Notable Event    Date/Time: 6/24/2024 10:40 AM    Patient location during procedure: PACU  Performed by: Tawana Verde CRNA  Authorized by: Tad Raza MD    Anesthesia notable event Other: other  Light dime sized bruising under right eye, suspected r/t eye pro sticker removal

## 2024-06-24 NOTE — ANESTHESIA POSTPROCEDURE EVALUATION
Post-Op Assessment Note    CV Status:  Stable    Pain management: satisfactory to patient       Mental Status:  Alert, awake and sleepy   Hydration Status:  Euvolemic   PONV Controlled:  Controlled   Airway Patency:  Patent     Post Op Vitals Reviewed: Yes    Anethesia notable event occurred.    Staff: CRNA               BP   122/67   Temp   98.5   Pulse  78   Resp   12   SpO2   96

## 2024-06-24 NOTE — PLAN OF CARE
Problem: PAIN - ADULT  Goal: Verbalizes/displays adequate comfort level or baseline comfort level  Description: Interventions:  - Encourage patient to monitor pain and request assistance  - Assess pain using appropriate pain scale  - Administer analgesics based on type and severity of pain and evaluate response  - Implement non-pharmacological measures as appropriate and evaluate response  - Consider cultural and social influences on pain and pain management  - Notify physician/advanced practitioner if interventions unsuccessful or patient reports new pain  Outcome: Progressing     Problem: INFECTION - ADULT  Goal: Absence or prevention of progression during hospitalization  Description: INTERVENTIONS:  - Assess and monitor for signs and symptoms of infection  - Monitor lab/diagnostic results  - Monitor all insertion sites, i.e. indwelling lines, tubes, and drains  - Monitor endotracheal if appropriate and nasal secretions for changes in amount and color  - Howe appropriate cooling/warming therapies per order  - Administer medications as ordered  - Instruct and encourage patient and family to use good hand hygiene technique  - Identify and instruct in appropriate isolation precautions for identified infection/condition  Outcome: Progressing  Goal: Absence of fever/infection during neutropenic period  Description: INTERVENTIONS:  - Monitor WBC    Outcome: Progressing     Problem: SAFETY ADULT  Goal: Patient will remain free of falls  Description: INTERVENTIONS:  - Educate patient/family on patient safety including physical limitations  - Instruct patient to call for assistance with activity   - Consult OT/PT to assist with strengthening/mobility   - Keep Call bell within reach  - Keep bed low and locked with side rails adjusted as appropriate  - Keep care items and personal belongings within reach  - Initiate and maintain comfort rounds  - Make Fall Risk Sign visible to staff  - Offer Toileting every  Hours,  in advance of need  - Initiate/Maintain alarm  - Obtain necessary fall risk management equipment:   - Apply yellow socks and bracelet for high fall risk patients  - Consider moving patient to room near nurses station  Outcome: Progressing  Goal: Maintain or return to baseline ADL function  Description: INTERVENTIONS:  -  Assess patient's ability to carry out ADLs; assess patient's baseline for ADL function and identify physical deficits which impact ability to perform ADLs (bathing, care of mouth/teeth, toileting, grooming, dressing, etc.)  - Assess/evaluate cause of self-care deficits   - Assess range of motion  - Assess patient's mobility; develop plan if impaired  - Assess patient's need for assistive devices and provide as appropriate  - Encourage maximum independence but intervene and supervise when necessary  - Involve family in performance of ADLs  - Assess for home care needs following discharge   - Consider OT consult to assist with ADL evaluation and planning for discharge  - Provide patient education as appropriate  Outcome: Progressing  Goal: Maintains/Returns to pre admission functional level  Description: INTERVENTIONS:  - Perform AM-PAC 6 Click Basic Mobility/ Daily Activity assessment daily.  - Set and communicate daily mobility goal to care team and patient/family/caregiver.   - Collaborate with rehabilitation services on mobility goals if consulted  - Perform Range of Motion  times a day.  - Reposition patient every hours.  - Dangle patient  times a day  - Stand patient  times a day  - Ambulate patient  times a day  - Out of bed to chair  times a day   - Out of bed for meals times a day  - Out of bed for toileting  - Record patient progress and toleration of activity level   Outcome: Progressing     Problem: DISCHARGE PLANNING  Goal: Discharge to home or other facility with appropriate resources  Description: INTERVENTIONS:  - Identify barriers to discharge w/patient and caregiver  - Arrange for  needed discharge resources and transportation as appropriate  - Identify discharge learning needs (meds, wound care, etc.)  - Arrange for interpretive services to assist at discharge as needed  - Refer to Case Management Department for coordinating discharge planning if the patient needs post-hospital services based on physician/advanced practitioner order or complex needs related to functional status, cognitive ability, or social support system  Outcome: Progressing     Problem: Knowledge Deficit  Goal: Patient/family/caregiver demonstrates understanding of disease process, treatment plan, medications, and discharge instructions  Description: Complete learning assessment and assess knowledge base.  Interventions:  - Provide teaching at level of understanding  - Provide teaching via preferred learning methods  Outcome: Progressing     Problem: GASTROINTESTINAL - ADULT  Goal: Minimal or absence of nausea and/or vomiting  Description: INTERVENTIONS:  - Administer IV fluids if ordered to ensure adequate hydration  - Maintain NPO status until nausea and vomiting are resolved  - Nasogastric tube if ordered  - Administer ordered antiemetic medications as needed  - Provide nonpharmacologic comfort measures as appropriate  - Advance diet as tolerated, if ordered  - Consider nutrition services referral to assist patient with adequate nutrition and appropriate food choices  Outcome: Progressing  Goal: Maintains or returns to baseline bowel function  Description: INTERVENTIONS:  - Assess bowel function  - Encourage oral fluids to ensure adequate hydration  - Administer IV fluids if ordered to ensure adequate hydration  - Administer ordered medications as needed  - Encourage mobilization and activity  - Consider nutritional services referral to assist patient with adequate nutrition and appropriate food choices  Outcome: Progressing  Goal: Maintains adequate nutritional intake  Description: INTERVENTIONS:  - Monitor percentage  of each meal consumed  - Identify factors contributing to decreased intake, treat as appropriate  - Assist with meals as needed  - Monitor I&O, weight, and lab values if indicated  - Obtain nutrition services referral as needed  Outcome: Progressing  Goal: Establish and maintain optimal ostomy function  Description: INTERVENTIONS:  - Assess bowel function  - Encourage oral fluids to ensure adequate hydration  - Administer IV fluids if ordered to ensure adequate hydration   - Administer ordered medications as needed  - Encourage mobilization and activity  - Nutrition services referral to assist patient with appropriate food choices  - Assess stoma site  - Consider wound care consult   Outcome: Progressing  Goal: Oral mucous membranes remain intact  Description: INTERVENTIONS  - Assess oral mucosa and hygiene practices  - Implement preventative oral hygiene regimen  - Implement oral medicated treatments as ordered  - Initiate Nutrition services referral as needed  Outcome: Progressing     Problem: METABOLIC, FLUID AND ELECTROLYTES - ADULT  Goal: Electrolytes maintained within normal limits  Description: INTERVENTIONS:  - Monitor labs and assess patient for signs and symptoms of electrolyte imbalances  - Administer electrolyte replacement as ordered  - Monitor response to electrolyte replacements, including repeat lab results as appropriate  - Instruct patient on fluid and nutrition as appropriate  Outcome: Progressing  Goal: Fluid balance maintained  Description: INTERVENTIONS:  - Monitor labs   - Monitor I/O and WT  - Instruct patient on fluid and nutrition as appropriate  - Assess for signs & symptoms of volume excess or deficit  Outcome: Progressing  Goal: Glucose maintained within target range  Description: INTERVENTIONS:  - Monitor Blood Glucose as ordered  - Assess for signs and symptoms of hyperglycemia and hypoglycemia  - Administer ordered medications to maintain glucose within target range  - Assess  nutritional intake and initiate nutrition service referral as needed  Outcome: Progressing     Problem: MUSCULOSKELETAL - ADULT  Goal: Maintain or return mobility to safest level of function  Description: INTERVENTIONS:  - Assess patient's ability to carry out ADLs; assess patient's baseline for ADL function and identify physical deficits which impact ability to perform ADLs (bathing, care of mouth/teeth, toileting, grooming, dressing, etc.)  - Assess/evaluate cause of self-care deficits   - Assess range of motion  - Assess patient's mobility  - Assess patient's need for assistive devices and provide as appropriate  - Encourage maximum independence but intervene and supervise when necessary  - Involve family in performance of ADLs  - Assess for home care needs following discharge   - Consider OT consult to assist with ADL evaluation and planning for discharge  - Provide patient education as appropriate  Outcome: Progressing  Goal: Maintain proper alignment of affected body part  Description: INTERVENTIONS:  - Support, maintain and protect limb and body alignment  - Provide patient/ family with appropriate education  Outcome: Progressing

## 2024-06-24 NOTE — INTERVAL H&P NOTE
ASSESSMENT:    Brent  presents today for surgery, new diagnosis of a ascending colon adenocarcinoma, colon cancer.  Family hx polyps, brother.     We discussed at office his multiple high risks, discussed a approximately 6-week lead up to surgery to include iron infusions, surgical optimization, and any ongoing modification/optimization of his diabetes, cardiology clearance.     We discussed robotic, possible laparoscopic versus open right hemicolectomy,in a face-to-face, personal, informed consent process, the benefits, alternatives, risks including not limited to bleeding, infection, risks of anesthesia, open surgery, DVT/PE, heart attack, stroke, death, damage to local structures(e.g. ureter, duodenum),anastomotic leak requiring reoperation, temporary versus permanent stoma. They understood these risks, signed informed consent, and wish to proceed.     PLAN:  -Robotic, possible laparoscopic versus open right hemicolectomy   H&P reviewed. After examining the patient I find no changes in the patients condition since the H&P had been written.  Gen:no distress  HEENT:Perrla/eomi  CV:sinus  Lung:clear bilateral  Abd:soft,nontender  Ext: no edema    Vitals:    06/24/24 0627   BP: 118/71   Pulse: 84   Temp: 97.6 °F (36.4 °C)   SpO2: 98%

## 2024-06-24 NOTE — CONSULTS
Endocrinology Consultation  Brent Hall 58 y.o. male MRN: [id number]  Unit/Bed#: [facility] 512-01 Encounter: [id number]      Assessment & Plan     Assessment:  This is a 58 y.o.-year-old male with recently diagnosed type 2 diabetes, with significantly improved control from A1c of 14% in December to 6.6% in May, admitted for elective right hemicolectomy for recently diagnosed adenocarcinoma of colon.  On clear liquid diet, with poor oral intake, however patient did receive large dexamethasone dose perioperatively, expect resultant hyperglycemia.    Plan:  -- Start Lantus 20 units tonight  --Continue Algorithm 3/2 correction scale  --Will hold off of bolus mealtime insulin at this time  --Hypoglycemia protocol  --Will continue to follow and make adjustments as appropriate    CC: Diabetes Consult    History of Present Illness     HPI: Brent Hall is a 58 y.o. year old male with type 2 diabetes for diagnosed December 2023, on basal bolus therapy, with complication of CAD. Recent A1c 6.6% 5/8/24, down from 14% on diagnosis 12/23. Also with history of  HTN, HLD, CKD 2, ADRIEN 2/2 recently diagnosed primary Adenocarcinoma ascending colon.    Patient did establish care with Dr. Lynne after December 2023 admission with STEMI, new DM2 diabetes diagnosis with A1c of 14%.  Last saw GINETTE Almanza on 5/16/2024.   Current insulin regimen is Lantus 27 units nightly, Fiasp 3 units breakfast, 5 units lunch, 5 units dinner.  Patient reports he took 20 units of Lantus last night.    Patient presented to [facility] 6/24/2024 for elective right hemicolectomy for recently diagnosed adenocarcinoma colon, seen postoperatively.  Has been ordered clear liquid diet.  Patient did receive 10 units of Decadron at 8 AM this morning perioperatively.    Inpatient consult to Endocrinology  Consult performed by: Jocelyn Noonan DO  Consult ordered by: Justine Casarez PA-C          Review of Systems   Unable to perform ROS: Acuity of condition (Postop, on  pain meds)   Gastrointestinal:  Positive for abdominal pain, nausea and vomiting.       Historical Information   Past Medical History:   Diagnosis Date    Anemia     CAD (coronary artery disease)     Diabetes mellitus (HCC)     GERD (gastroesophageal reflux disease)     Hyperlipidemia     Hypertension     Pt denies    Obesity (BMI 30-39.9)      Past Surgical History:   Procedure Laterality Date    CARDIAC CATHETERIZATION Left 2023    Procedure: Cardiac Left Heart Cath;  Surgeon: Nacho Salinas DO;  Location: [facility];  Service: Cardiology    CARDIAC CATHETERIZATION N/A 2023    Procedure: Cardiac Coronary Angiogram;  Surgeon: Nacho Salinas DO;  Location: [facility];  Service: Cardiology    CARDIAC CATHETERIZATION  2023    Procedure: Cardiac catheterization;  Surgeon: Nacho Salinas DO;  Location: [facility];  Service: Cardiology    ULNAR NERVE REPAIR Left 2018    WRIST ARTHRODESIS Bilateral     2014     Social History   Social History     Substance and Sexual Activity   Alcohol Use Not Currently    Comment: very rare     Social History     Substance and Sexual Activity   Drug Use Never     Social History     Tobacco Use   Smoking Status Former    Current packs/day: 0.00    Types: Cigarettes    Quit date: 2010    Years since quittin.5   Smokeless Tobacco Never     Family History:   Family History   Problem Relation Age of Onset    Diabetes Mother     Diabetes Father     Valvular heart disease Father     Atrial fibrillation Father     Diabetes Sister     Lung disease Sister     Heart failure Sister        Meds/Allergies   Current Facility-Administered Medications   Medication Dose Route Frequency Provider Last Rate Last Admin    acetaminophen (Ofirmev) injection 1,000 mg  1,000 mg Intravenous Q6H MERCEDES Martinez PA-C        aluminum-magnesium hydroxide-simethicone (MAALOX) oral suspension 30 mL  30 mL Oral Q6H PRN Justine  SHAYY Casarez        atorvastatin (LIPITOR) tablet 80 mg  80 mg Oral After Dinner Justine Casarez PA-C        calcium carbonate (TUMS) chewable tablet 1,000 mg  1,000 mg Oral Daily PRN Justine Casarez PA-C        diphenhydrAMINE (BENADRYL) injection 25 mg  25 mg Intravenous Q6H PRN Justine Casarez PA-C        docusate sodium (COLACE) capsule 100 mg  100 mg Oral BID Justine Casarez PA-C        heparin (porcine) subcutaneous injection 5,000 Units  5,000 Units Subcutaneous Q8H MERCEDES Justine Casarez PA-C        HYDROmorphone (DILAUDID) 1 mg/mL 50 mL PCA   Intravenous Continuous Eli Martinez PA-C   Rate Change at 06/24/24 1308    HYDROmorphone (DILAUDID) injection 0.5 mg  0.5 mg Intravenous Q3H PRN Eli Martinez PA-C        insulin lispro (HumALOG/ADMELOG) 100 units/mL subcutaneous injection 1-5 Units  1-5 Units Subcutaneous HS Justine Casarez PA-C        insulin lispro (HumALOG/ADMELOG) 100 units/mL subcutaneous injection 1-6 Units  1-6 Units Subcutaneous TID AC Azam Carreon MD        lactated ringers bolus 1,000 mL  1,000 mL Intravenous Once PRN Justine Casarez PA-C        And    lactated ringers bolus 1,000 mL  1,000 mL Intravenous Once PRN Justine Casarez PA-C        lactated ringers infusion  100 mL/hr Intravenous Continuous Eli Martinez PA-C 100 mL/hr at 06/24/24 1307 100 mL/hr at 06/24/24 1307    [START ON 6/25/2024] metoprolol succinate (TOPROL-XL) 24 hr tablet 25 mg  25 mg Oral Daily Justine Casarez PA-C        naloxone (NARCAN) 0.04 mg/mL syringe 0.04 mg  0.04 mg Intravenous Q3 min PRN Justine Casarez PA-C        nitroglycerin (NITROSTAT) SL tablet 0.4 mg  0.4 mg Sublingual Q5 Min PRN Justine Casarez PA-C        ondansetron (ZOFRAN) injection 4 mg  4 mg Intravenous Q6H PRN Justine Casarez PA-C        pantoprazole (PROTONIX) injection 40 mg  40 mg Intravenous Q24H MERCEDES Martinez,  "SHAYY        sodium chloride 0.9 % bolus 1,000 mL  1,000 mL Intravenous Once PRN Justine Casarez PA-C        And    sodium chloride 0.9 % bolus 1,000 mL  1,000 mL Intravenous Once PRN Justine Casarez PA-C         No Known Allergies    Objective   Vitals: Blood pressure 131/81, pulse 80, temperature 98.5 °F (36.9 °C), temperature source Axillary, resp. rate 14, height 5' 10\" (1.778 m), weight 100 kg (220 lb 7.4 oz), SpO2 93%.    Intake/Output Summary (Last 24 hours) at 6/24/2024 1310  Last data filed at 6/24/2024 1031  Gross per 24 hour   Intake 3000 ml   Output 170 ml   Net 2830 ml     Invasive Devices       Peripheral Intravenous Line  Duration             Peripheral IV 06/24/24 Left Forearm <1 day    Peripheral IV 06/24/24 Left Hand <1 day    Peripheral IV 06/24/24 Right Hand <1 day              Arterial Line  Duration             Arterial Line 06/24/24 Right Radial <1 day              Drain  Duration             Urethral Catheter Double-lumen;Latex 16 Fr. <1 day                    Physical Exam  Constitutional:       General: He is not in acute distress.     Appearance: Normal appearance. He is obese. He is not ill-appearing, toxic-appearing or diaphoretic.   HENT:      Head: Normocephalic and atraumatic.      Nose: Nose normal.   Eyes:      Extraocular Movements: Extraocular movements intact.      Conjunctiva/sclera: Conjunctivae normal.      Pupils: Pupils are equal, round, and reactive to light.   Cardiovascular:      Rate and Rhythm: Normal rate.   Pulmonary:      Effort: Pulmonary effort is normal. No respiratory distress.   Abdominal:      General: There is no distension.   Musculoskeletal:         General: No deformity.      Right lower leg: No edema.      Left lower leg: No edema.   Skin:     General: Skin is warm and dry.   Neurological:      General: No focal deficit present.      Mental Status: He is alert. Mental status is at baseline.   Psychiatric:         Mood and Affect: Mood normal.  " "       Behavior: Behavior normal.         Thought Content: Thought content normal.         The history was obtained from the review of the chart, patient and family.    Lab Results:       Lab Results   Component Value Date    WBC 5.29 06/20/2024    HGB 11.2 (L) 06/20/2024    HCT 37.9 06/20/2024    MCV 77 (L) 06/20/2024     06/20/2024     Lab Results   Component Value Date/Time    BUN 19 04/27/2024 11:26 AM    BUN 18 07/30/2020 12:31 PM    K 3.9 04/27/2024 11:26 AM    K 4.8 07/30/2020 12:31 PM     04/27/2024 11:26 AM     07/30/2020 12:31 PM    CO2 25 04/27/2024 11:26 AM    CO2 27 07/30/2020 12:31 PM    CREATININE 1.19 04/27/2024 11:26 AM    CREATININE 1.12 07/30/2020 12:31 PM    AST 15 04/27/2024 11:26 AM    AST 21 07/30/2020 12:31 PM    ALT 15 04/27/2024 11:26 AM    ALT 43 07/30/2020 12:31 PM    TP 6.6 04/27/2024 11:26 AM    TP 7.3 07/30/2020 12:31 PM    ALB 4.4 04/27/2024 11:26 AM    ALB 4.3 07/30/2020 12:31 PM     No results for input(s): \"CHOL\", \"HDL\", \"LDL\", \"TRIG\", \"VLDL\" in the last 72 hours.  No results found for: \"MICROALBUR\", \"WVRO63CCH\"  POC Glucose (mg/dl)   Date Value   06/24/2024 227 (H)   06/24/2024 160 (H)   04/16/2024 157 (H)   12/12/2023 223 (H)   12/12/2023 173 (H)   12/12/2023 160 (H)   12/11/2023 136   12/11/2023 185 (H)   12/11/2023 157 (H)   12/11/2023 212 (H)       Imaging Studies: I have personally reviewed pertinent reports.      Jocelyn Noonan  Endocrinology PGY-4    Please Tigertext questions to the physician covering the \"[facility]\" Role. Thank you.   "

## 2024-06-24 NOTE — QUICK NOTE
Called because patient's wife is concerned about the right eye being black and blue.  Patient has  right upper eyelid edema and ecchymosis. The lower lid also ecchymotic. Spoke with patient's nurse and asked her to let anesthesia know and speak to the wife.     Pain medications added for his discomfort. Only a PCA   was ordered with 1.2 mg hourly lockout.   Tylenol q 6 hours, Dilaudid IV breakthru added to regiment.

## 2024-06-24 NOTE — OP NOTE
OPERATIVE REPORT  PATIENT NAME: Brent Hall    :  1965  MRN: [id number]  Pt Location: BE OR ROOM 14    SURGERY DATE: 2024    Surgeons and Role:     * Azam Carreon MD - Primary     * Justine Casarez PA-C - Assisting-she was required, no qualified resident available for bedside assist position robotic docking, suction, traction/countertraction, assistance with anastomosis, robot undocking and closure    Preop Diagnosis:  Primary adenocarcinoma of ascending colon (HCC) [C18.2]    Post-Op Diagnosis Codes:     * Primary adenocarcinoma of ascending colon (HCC) [C18.2]    Procedure(s):  -Diagnostic laparoscopy  -Robotic right hemicolectomy  -Ileocolic, ileum to mid transverse colon, side-to-side, functional end-to-end stapled anastomosis  -Intraoperative fluorescence angiography    Specimen(s):  ID Type Source Tests Collected by Time Destination   1 : Right hemicolectomy Tissue Colon TISSUE EXAM Azam Carreon MD 2024 [id number]        Estimated Blood Loss:   Minimal    Drains:  NG/OG/Enteral Tube Orogastric 18 Fr (Active)   Number of days: 0       Urethral Catheter Double-lumen;Latex 16 Fr. (Active)   Number of days: 0       Anesthesia Type:   General    Operative Indications:  Primary adenocarcinoma of ascending colon (HCC) [C18.2]    Operative Findings:  -Ascending colon tattoo located,ileum to transverse colon, side-to-side functional end-to-end stapled ileocolic anastomosis,good fluorescence Firefly proximal to stapler, ileum and transverse colon    -Arm 1 Suprapubic off midline to Right,8mm port  Force Bipolar  -Arm 2 Supraumbilical 12mm port camera, eventually enlarged to extraction incision  -Arm 3-left midepigastric vessel sealer, 8mm port  -Arm4-LUQ TipUp 8mm  -Assist LUQ 5mm port    Complications:   None    Procedure and Technique:  Brent  presents today for surgery, new diagnosis of a ascending colon adenocarcinoma, colon cancer.  Family hx polyps, brother.     We discussed at office  his multiple high risks, discussed a approximately 6-week lead up to surgery to include iron infusions, surgical optimization, and any ongoing modification/optimization of his diabetes, cardiology clearance.     We discussed robotic, possible laparoscopic versus open right hemicolectomy,in a face-to-face, personal, informed consent process, the benefits, alternatives, risks including not limited to bleeding, infection, risks of anesthesia, open surgery, DVT/PE, heart attack, stroke, death, damage to local structures(e.g. ureter, duodenum),anastomotic leak requiring reoperation, temporary versus permanent stoma. They understood these risks, signed informed consent, and wish to proceed.    He was brought to the operating room where general endotracheal anesthesia was induced without event.  He was placed in supine position pink pad positioner, attention to joint/bony extremities.  Venodynes were on and running throughout the procedure.  He was given cefazolin/Flagyl prior to skin incision, heparin subcutaneous was given preoperatively.    He was ChloraPrep abdomen and sterile draped/Ioban after appropriate drying time.  After time-out was taken per protocol procedure began with Optiview/gasless placement of a left upper quadrant 5 mm trocar, until peritoneal cavity was visualized and entered without event, 15 mm CO2 pneumoperitoneum established, diagnostic laparoscopy showed ascending tattoo, no other obvious peritoneal or hepatic lesion.    Four robotic ports were then placed as listed in the findings,remote centers were placed at appropriate level, camera was placed and aimed at the right lower quadrant after bringing the robot in and docking over the right hip.  Targeting was accomplished with appropriate hand support at the ports which had all tension released after instrument placement and targeting was completed.    Once docking was completed I moved to the console where the instruments were oriented in  consistent view in the field to avoid injury.    The ileocolic fat pad was grasped and raised cephalad. The ileocolic pedicle was easily visualized and skeletonized sweeping down the retroperitoneal structures. This was ligated high between triple burns of the vessel sealer.The medial lateral dissection was then continued in this plane sweeping all retroperitoneal structures down and away until hepatic flexure was encountered and the duodenum was swept out of the field.  Once we reached the white line from the medial side this was incised and completed the hepatic flexure mobilization from below, The mesentery to this area was also taken between double burns of the Enseal energy device. The lateral dissection was then undertaken on the white line of Toldt and medializing the entire right colon until it was ready for anastomosis, ensuring free terminal ileum and transverse colon after taking down omental colic attachments.  Intraoperative fluorescence angiography was performed showing good blood supply to the demarcated ileum and mid transverse colon with ICG injected by anesthesia while in fluorescence/firefly mode.    At this point a grasper was placed on the ileocecal fat pad to bring it into the eventual extraction, robotic instruments were removed under vision and robot was undocked without event and pulled away from the field.  I scrubbed back into the  field at this time.    A 6 cm extraction incision was made extending from the umbilical trocar. An Yuriy wound retractor was immediately placed to protect the wound and the previously grasped and marked small bowel was brought into the extraction incision.    The then grasped appendix was brought into the incision and inspected. The ileum and transverse colon were lined up on the antimesenteric side of the small bowel and on the antimesenteric taenia of the large bowel. With a 3-0 Vicryl suture. Enterotomies were made and MAGDY blue load 100 was placed  and after orientation and appropriate compression time fired. This was opened showing good common channel and no hemorrhage. The Allis clamps were then used to close this open end and bring it through a TA 60 blue load to functionally close the enterotomy, note that once this was clamped anesthesia repeat injected indocyanine green for firefly fluorescence which showed good/prompt green coloration of the ileum and colon all the way up to the staple line, then fired after appropriate compression time. This was removed passed off as specimen the corners and crossing staple line were also placed in the similar fashion with 3-0 Vicryl suture on this anastomosis.    Irrigation was undertaken and ran clean. The colon bundle closing tray was used to change all  gloves and provide a clean field for closure which was undertaken with fascial #1 PDS. Skin was closed after irrigation with 4-0 Monocryl and Histoacryl glue at the incision and remaining port sites.    All sponge needle instrument/RF Wand counts were correct I was present and scrubbed for the entirety of the procedure, patient was extubated and brought to the recovery room in stable condition.    Patient Disposition:  PACU     Colon Resection  Operation performed with curative intent Yes   Tumor Location (select all that apply) Ascending colon   Extent of colon and vascular resection (select all that apply) Right hemicolectomy - ileocolic, right colic (if present)          SIGNATURE: Azam Carreon MD  DATE: June 24, 2024  TIME: 10:15 AM

## 2024-06-24 NOTE — RESPIRATORY THERAPY NOTE
RT Protocol Note  Brent Hall 58 y.o. male MRN: [id number]  Unit/Bed#: [facility] 512-01 Encounter: [id number]    Assessment    Active Problems:  There are no active Hospital Problems.      Home Pulmonary Medications:  none       Past Medical History:   Diagnosis Date    Anemia     CAD (coronary artery disease)     Diabetes mellitus (HCC)     GERD (gastroesophageal reflux disease)     Hyperlipidemia     Hypertension     Pt denies    Obesity (BMI 30-39.9)      Social History     Socioeconomic History    Marital status: /Civil Union     Spouse name: None    Number of children: None    Years of education: None    Highest education level: None   Occupational History    None   Tobacco Use    Smoking status: Former     Current packs/day: 0.00     Types: Cigarettes     Quit date: 2010     Years since quittin.5    Smokeless tobacco: Never   Vaping Use    Vaping status: Never Used   Substance and Sexual Activity    Alcohol use: Not Currently     Comment: very rare    Drug use: Never    Sexual activity: None     Comment: defer   Other Topics Concern    None   Social History Narrative    None     Social Determinants of Health     Financial Resource Strain: Low Risk  (2024)    [facility]    Overall Financial Resource Strain (CARDIA)     Difficulty of Paying Living Expenses: Not very hard   Food Insecurity: No Food Insecurity (2024)    [facility]    Hunger Vital Sign     Worried About Running Out of Food in the Last Year: Never true     Ran Out of Food in the Last Year: Never true   Transportation Needs: No Transportation Needs (2024)    [facility]    PRAPARE - Transportation     Lack of Transportation (Medical): No     Lack of Transportation (Non-Medical): No   Physical Activity: Not on file   Stress: No Stress Concern  "Present (5/8/2024)    Received from [facility], [facility]    [employer] [place] [employer] - Occupational Stress Questionnaire     Feeling of Stress : Not at all   Social Connections: Unknown (6/18/2024)    Received from [facility]     How often do you feel lonely or isolated from those around you? (Adult - for ages 18 years and over): Not on file   Intimate Partner Violence: Not At Risk (5/8/2024)    Received from [facility], [facility]    Humiliation, Afraid, Rape, and Kick questionnaire     Fear of Current or Ex-Partner: No     Emotionally Abused: No     Physically Abused: No     Sexually Abused: No   Housing Stability: Low Risk  (5/8/2024)    [facility] from [facility], [facility]    Housing Stability Vital Sign     Unable to Pay for Housing in the Last Year: No     Number of Times Moved in the Last Year: 0     Homeless in the Last Year: No       Subjective         Objective    Physical Exam:   Assessment Type: Assess only  General Appearance: Sleeping  Respiratory Pattern: Normal  Bilateral Breath Sounds: Clear  Location Specific: No  Cough: None  O2 Device: NC    Vitals:  Blood pressure 135/86, pulse 78, temperature 98.2 °F (36.8 °C), temperature source Oral, resp. rate 16, height 5' 10\" (1.778 m), weight 99.8 kg (220 lb), SpO2 93%.          Imaging and other studies: I have personally reviewed pertinent reports.  See note below    O2 Device: NC     Plan             Resp Comments: Note from 10 May this year: \"Brent is a 58-year-old male, presents to office for new diagnosis of a ascending colon adenocarcinoma, colon cancer.\"  Today's note:  57 y/o post op Right hemicolectomy patient from earlier today.  Pt is on Diaz PSN.  Capnography order (per policy) will be d/c'ed.  Pt currently sound asleep and on his 3 lpm NC.  SpO2 of 94%, HR of 75 and RR of 18 per in room " monitoring equipment.  Quit smoking cigarettes on 2010.  No pulm history or pulm meds taken at home.  CXR today: There are multiple pulmonary nodules throughout the lungs measuring between 3 and 5 mm , indicated on series 4. Cannot exclude metastases. No tracheal or endobronchial lesion.  Will d/c RCP and institute Airway Clearance Protocol for pt to practice using Incentive Spriometer device once he awakens.  Visitor in the room with him.  Unable to instruct on IS now.

## 2024-06-24 NOTE — PLAN OF CARE
Problem: PAIN - ADULT  Goal: Verbalizes/displays adequate comfort level or baseline comfort level  Description: Interventions:  - Encourage patient to monitor pain and request assistance  - Assess pain using appropriate pain scale  - Administer analgesics based on type and severity of pain and evaluate response  - Implement non-pharmacological measures as appropriate and evaluate response  - Consider cultural and social influences on pain and pain management  - Notify physician/advanced practitioner if interventions unsuccessful or patient reports new pain  Outcome: Progressing     Problem: INFECTION - ADULT  Goal: Absence or prevention of progression during hospitalization  Description: INTERVENTIONS:  - Assess and monitor for signs and symptoms of infection  - Monitor lab/diagnostic results  - Monitor all insertion sites, i.e. indwelling lines, tubes, and drains  - Monitor endotracheal if appropriate and nasal secretions for changes in amount and color  - [place] appropriate cooling/warming therapies per order  - Administer medications as ordered  - Instruct and encourage patient and family to use good hand hygiene technique  - Identify and instruct in appropriate isolation precautions for identified infection/condition  Outcome: Progressing  Goal: Absence of fever/infection during neutropenic period  Description: INTERVENTIONS:  - Monitor WBC    Outcome: Progressing     Problem: SAFETY ADULT  Goal: Patient will remain free of falls  Description: INTERVENTIONS:  - Educate patient/family on patient safety including physical limitations  - Instruct patient to call for assistance with activity   - Consult OT/PT to assist with strengthening/mobility   - Keep Call bell within reach  - Keep bed low and locked with side rails adjusted as appropriate  - Keep care items and personal belongings within reach  - Initiate and maintain comfort rounds  - Make Fall Risk Sign visible to staff  - Apply yellow socks and bracelet  for high fall risk patients  - Consider moving patient to room near nurses station  Outcome: Progressing  Goal: Maintain or return to baseline ADL function  Description: INTERVENTIONS:  -  Assess patient's ability to carry out ADLs; assess patient's baseline for ADL function and identify physical deficits which impact ability to perform ADLs (bathing, care of mouth/teeth, toileting, grooming, dressing, etc.)  - Assess/evaluate cause of self-care deficits   - Assess range of motion  - Assess patient's mobility; develop plan if impaired  - Assess patient's need for assistive devices and provide as appropriate  - Encourage maximum independence but intervene and supervise when necessary  - Involve family in performance of ADLs  - Assess for home care needs following discharge   - Consider OT consult to assist with ADL evaluation and planning for discharge  - Provide patient education as appropriate  Outcome: Progressing  Goal: Maintains/Returns to pre admission functional level  Description: INTERVENTIONS:  - Perform AM-PAC 6 Click Basic Mobility/ Daily Activity assessment daily.  - Set and communicate daily mobility goal to care team and patient/family/caregiver.   - Collaborate with rehabilitation services on mobility goals if consulted  - Perform Range of Motion 3 times a day.  - Reposition patient every 3 hours.  - Dangle patient 3 times a day  - Stand patient 3 times a day  - Ambulate patient 3 times a day  - Out of bed to chair 3 times a day   - Out of bed for meals 3 times a day  - Out of bed for toileting  - Record patient progress and toleration of activity level   Outcome: Progressing     Problem: DISCHARGE PLANNING  Goal: Discharge to home or other facility with appropriate resources  Description: INTERVENTIONS:  - Identify barriers to discharge w/patient and caregiver  - Arrange for needed discharge resources and transportation as appropriate  - Identify discharge learning needs (meds, wound care, etc.)  -  Arrange for interpretive services to assist at discharge as needed  - Refer to Case Management Department for coordinating discharge planning if the patient needs post-hospital services based on physician/advanced practitioner order or complex needs related to functional status, cognitive ability, or social support system  Outcome: Progressing     Problem: Knowledge Deficit  Goal: Patient/family/caregiver demonstrates understanding of disease process, treatment plan, medications, and discharge instructions  Description: Complete learning assessment and assess knowledge base.  Interventions:  - Provide teaching at level of understanding  - Provide teaching via preferred learning methods  Outcome: Progressing     Problem: GASTROINTESTINAL - ADULT  Goal: Minimal or absence of nausea and/or vomiting  Description: INTERVENTIONS:  - Administer IV fluids if ordered to ensure adequate hydration  - Maintain NPO status until nausea and vomiting are resolved  - Nasogastric tube if ordered  - Administer ordered antiemetic medications as needed  - Provide nonpharmacologic comfort measures as appropriate  - Advance diet as tolerated, if ordered  - Consider nutrition services referral to assist patient with adequate nutrition and appropriate food choices  Outcome: Progressing  Goal: Maintains or returns to baseline bowel function  Description: INTERVENTIONS:  - Assess bowel function  - Encourage oral fluids to ensure adequate hydration  - Administer IV fluids if ordered to ensure adequate hydration  - Administer ordered medications as needed  - Encourage mobilization and activity  - Consider nutritional services referral to assist patient with adequate nutrition and appropriate food choices  Outcome: Progressing  Goal: Maintains adequate nutritional intake  Description: INTERVENTIONS:  - Monitor percentage of each meal consumed  - Identify factors contributing to decreased intake, treat as appropriate  - Assist with meals as  needed  - Monitor I&O, weight, and lab values if indicated  - Obtain nutrition services referral as needed  Outcome: Progressing  Goal: Establish and maintain optimal ostomy function  Description: INTERVENTIONS:  - Assess bowel function  - Encourage oral fluids to ensure adequate hydration  - Administer IV fluids if ordered to ensure adequate hydration   - Administer ordered medications as needed  - Encourage mobilization and activity  - Nutrition services referral to assist patient with appropriate food choices  - Assess stoma site  - Consider wound care consult   Outcome: Progressing  Goal: Oral mucous membranes remain intact  Description: INTERVENTIONS  - Assess oral mucosa and hygiene practices  - Implement preventative oral hygiene regimen  - Implement oral medicated treatments as ordered  - Initiate Nutrition services referral as needed  Outcome: Progressing     Problem: METABOLIC, FLUID AND ELECTROLYTES - ADULT  Goal: Electrolytes maintained within normal limits  Description: INTERVENTIONS:  - Monitor labs and assess patient for signs and symptoms of electrolyte imbalances  - Administer electrolyte replacement as ordered  - Monitor response to electrolyte replacements, including repeat lab results as appropriate  - Instruct patient on fluid and nutrition as appropriate  Outcome: Progressing  Goal: Fluid balance maintained  Description: INTERVENTIONS:  - Monitor labs   - Monitor I/O and WT  - Instruct patient on fluid and nutrition as appropriate  - Assess for signs & symptoms of volume excess or deficit  Outcome: Progressing  Goal: Glucose maintained within target range  Description: INTERVENTIONS:  - Monitor Blood Glucose as ordered  - Assess for signs and symptoms of hyperglycemia and hypoglycemia  - Administer ordered medications to maintain glucose within target range  - Assess nutritional intake and initiate nutrition service referral as needed  Outcome: Progressing     Problem: MUSCULOSKELETAL -  ADULT  Goal: Maintain or return mobility to safest level of function  Description: INTERVENTIONS:  - Assess patient's ability to carry out ADLs; assess patient's baseline for ADL function and identify physical deficits which impact ability to perform ADLs (bathing, care of mouth/teeth, toileting, grooming, dressing, etc.)  - Assess/evaluate cause of self-care deficits   - Assess range of motion  - Assess patient's mobility  - Assess patient's need for assistive devices and provide as appropriate  - Encourage maximum independence but intervene and supervise when necessary  - Involve family in performance of ADLs  - Assess for home care needs following discharge   - Consider OT consult to assist with ADL evaluation and planning for discharge  - Provide patient education as appropriate  Outcome: Progressing  Goal: Maintain proper alignment of affected body part  Description: INTERVENTIONS:  - Support, maintain and protect limb and body alignment  - Provide patient/ family with appropriate education  Outcome: Progressing

## 2024-06-25 LAB
ANION GAP SERPL CALCULATED.3IONS-SCNC: 10 MMOL/L (ref 4–13)
BASOPHILS # BLD AUTO: 0 THOUSANDS/ÂΜL (ref 0–0.1)
BASOPHILS NFR BLD AUTO: 0 % (ref 0–1)
BUN SERPL-MCNC: 16 MG/DL (ref 5–25)
CALCIUM SERPL-MCNC: 8.2 MG/DL (ref 8.4–10.2)
CHLORIDE SERPL-SCNC: 106 MMOL/L (ref 96–108)
CO2 SERPL-SCNC: 24 MMOL/L (ref 21–32)
CREAT SERPL-MCNC: 0.88 MG/DL (ref 0.6–1.3)
EOSINOPHIL # BLD AUTO: 0 THOUSAND/ÂΜL (ref 0–0.61)
EOSINOPHIL NFR BLD AUTO: 0 % (ref 0–6)
ERYTHROCYTE [DISTWIDTH] IN BLOOD BY AUTOMATED COUNT: 25.1 % (ref 11.6–15.1)
GFR SERPL CREATININE-BSD FRML MDRD: 94 ML/MIN/1.73SQ M
GLUCOSE SERPL-MCNC: 114 MG/DL (ref 65–140)
GLUCOSE SERPL-MCNC: 131 MG/DL (ref 65–140)
GLUCOSE SERPL-MCNC: 134 MG/DL (ref 65–140)
GLUCOSE SERPL-MCNC: 139 MG/DL (ref 65–140)
GLUCOSE SERPL-MCNC: 139 MG/DL (ref 65–140)
HCT VFR BLD AUTO: 31.7 % (ref 36.5–49.3)
HGB BLD-MCNC: 9.4 G/DL (ref 12–17)
IMM GRANULOCYTES # BLD AUTO: 0.03 THOUSAND/UL (ref 0–0.2)
IMM GRANULOCYTES NFR BLD AUTO: 0 % (ref 0–2)
LYMPHOCYTES # BLD AUTO: 0.63 THOUSANDS/ÂΜL (ref 0.6–4.47)
LYMPHOCYTES NFR BLD AUTO: 8 % (ref 14–44)
MAGNESIUM SERPL-MCNC: 1.7 MG/DL (ref 1.9–2.7)
MCH RBC QN AUTO: 23.2 PG (ref 26.8–34.3)
MCHC RBC AUTO-ENTMCNC: 29.7 G/DL (ref 31.4–37.4)
MCV RBC AUTO: 78 FL (ref 82–98)
MONOCYTES # BLD AUTO: 0.78 THOUSAND/ÂΜL (ref 0.17–1.22)
MONOCYTES NFR BLD AUTO: 9 % (ref 4–12)
NEUTROPHILS # BLD AUTO: 6.84 THOUSANDS/ÂΜL (ref 1.85–7.62)
NEUTS SEG NFR BLD AUTO: 83 % (ref 43–75)
NRBC BLD AUTO-RTO: 0 /100 WBCS
PHOSPHATE SERPL-MCNC: 4.2 MG/DL (ref 2.7–4.5)
PLATELET # BLD AUTO: 170 THOUSANDS/UL (ref 149–390)
PMV BLD AUTO: 10.1 FL (ref 8.9–12.7)
POTASSIUM SERPL-SCNC: 4 MMOL/L (ref 3.5–5.3)
RBC # BLD AUTO: 4.06 MILLION/UL (ref 3.88–5.62)
SODIUM SERPL-SCNC: 140 MMOL/L (ref 135–147)
WBC # BLD AUTO: 8.28 THOUSAND/UL (ref 4.31–10.16)

## 2024-06-25 PROCEDURE — 84100 ASSAY OF PHOSPHORUS: CPT | Performed by: PHYSICIAN ASSISTANT

## 2024-06-25 PROCEDURE — 83735 ASSAY OF MAGNESIUM: CPT | Performed by: PHYSICIAN ASSISTANT

## 2024-06-25 PROCEDURE — 97163 PT EVAL HIGH COMPLEX 45 MIN: CPT

## 2024-06-25 PROCEDURE — C9113 INJ PANTOPRAZOLE SODIUM, VIA: HCPCS | Performed by: PHYSICIAN ASSISTANT

## 2024-06-25 PROCEDURE — 97166 OT EVAL MOD COMPLEX 45 MIN: CPT

## 2024-06-25 PROCEDURE — 80048 BASIC METABOLIC PNL TOTAL CA: CPT | Performed by: PHYSICIAN ASSISTANT

## 2024-06-25 PROCEDURE — 99024 POSTOP FOLLOW-UP VISIT: CPT | Performed by: COLON & RECTAL SURGERY

## 2024-06-25 PROCEDURE — 85025 COMPLETE CBC W/AUTO DIFF WBC: CPT | Performed by: PHYSICIAN ASSISTANT

## 2024-06-25 PROCEDURE — 82948 REAGENT STRIP/BLOOD GLUCOSE: CPT

## 2024-06-25 RX ORDER — IBUPROFEN 600 MG/1
600 TABLET ORAL EVERY 6 HOURS PRN
Status: DISCONTINUED | OUTPATIENT
Start: 2024-06-25 | End: 2024-06-26 | Stop reason: HOSPADM

## 2024-06-25 RX ORDER — SIMETHICONE 80 MG
80 TABLET,CHEWABLE ORAL EVERY 8 HOURS PRN
Status: DISCONTINUED | OUTPATIENT
Start: 2024-06-25 | End: 2024-06-26 | Stop reason: HOSPADM

## 2024-06-25 RX ORDER — LIDOCAINE 50 MG/G
1 PATCH TOPICAL DAILY
Status: DISCONTINUED | OUTPATIENT
Start: 2024-06-25 | End: 2024-06-26 | Stop reason: HOSPADM

## 2024-06-25 RX ORDER — METHOCARBAMOL 500 MG/1
500 TABLET, FILM COATED ORAL EVERY 6 HOURS SCHEDULED
Status: DISCONTINUED | OUTPATIENT
Start: 2024-06-25 | End: 2024-06-26 | Stop reason: HOSPADM

## 2024-06-25 RX ORDER — GABAPENTIN 300 MG/1
300 CAPSULE ORAL 3 TIMES DAILY
Status: DISCONTINUED | OUTPATIENT
Start: 2024-06-25 | End: 2024-06-26

## 2024-06-25 RX ORDER — PROMETHAZINE HYDROCHLORIDE 25 MG/ML
12.5 INJECTION, SOLUTION INTRAMUSCULAR; INTRAVENOUS EVERY 6 HOURS PRN
Status: DISCONTINUED | OUTPATIENT
Start: 2024-06-25 | End: 2024-06-26 | Stop reason: HOSPADM

## 2024-06-25 RX ORDER — OXYCODONE HYDROCHLORIDE 5 MG/1
5 TABLET ORAL EVERY 4 HOURS PRN
Status: DISCONTINUED | OUTPATIENT
Start: 2024-06-25 | End: 2024-06-25

## 2024-06-25 RX ORDER — OXYCODONE HYDROCHLORIDE 10 MG/1
10 TABLET ORAL EVERY 4 HOURS PRN
Status: DISCONTINUED | OUTPATIENT
Start: 2024-06-25 | End: 2024-06-25

## 2024-06-25 RX ADMIN — ACETAMINOPHEN 650 MG: 325 TABLET, FILM COATED ORAL at 17:13

## 2024-06-25 RX ADMIN — HEPARIN SODIUM 5000 UNITS: 5000 INJECTION INTRAVENOUS; SUBCUTANEOUS at 13:40

## 2024-06-25 RX ADMIN — METHOCARBAMOL 500 MG: 500 TABLET ORAL at 08:02

## 2024-06-25 RX ADMIN — CALCIUM CARBONATE (ANTACID) CHEW TAB 500 MG 1000 MG: 500 CHEW TAB at 19:19

## 2024-06-25 RX ADMIN — METOPROLOL SUCCINATE 25 MG: 25 TABLET, EXTENDED RELEASE ORAL at 08:02

## 2024-06-25 RX ADMIN — METHOCARBAMOL 500 MG: 500 TABLET ORAL at 13:40

## 2024-06-25 RX ADMIN — HEPARIN SODIUM 5000 UNITS: 5000 INJECTION INTRAVENOUS; SUBCUTANEOUS at 22:15

## 2024-06-25 RX ADMIN — ACETAMINOPHEN 650 MG: 325 TABLET, FILM COATED ORAL at 11:11

## 2024-06-25 RX ADMIN — MAGNESIUM SULFATE HEPTAHYDRATE 3 G: 500 INJECTION, SOLUTION INTRAMUSCULAR; INTRAVENOUS at 08:47

## 2024-06-25 RX ADMIN — SODIUM CHLORIDE, SODIUM LACTATE, POTASSIUM CHLORIDE, AND CALCIUM CHLORIDE 75 ML/HR: .6; .31; .03; .02 INJECTION, SOLUTION INTRAVENOUS at 19:44

## 2024-06-25 RX ADMIN — ONDANSETRON 4 MG: 2 INJECTION INTRAMUSCULAR; INTRAVENOUS at 20:45

## 2024-06-25 RX ADMIN — DOCUSATE SODIUM 100 MG: 100 CAPSULE, LIQUID FILLED ORAL at 17:13

## 2024-06-25 RX ADMIN — HEPARIN SODIUM 5000 UNITS: 5000 INJECTION INTRAVENOUS; SUBCUTANEOUS at 05:05

## 2024-06-25 RX ADMIN — SIMETHICONE 80 MG: 80 TABLET, CHEWABLE ORAL at 14:27

## 2024-06-25 RX ADMIN — ATORVASTATIN CALCIUM 80 MG: 80 TABLET, FILM COATED ORAL at 17:13

## 2024-06-25 RX ADMIN — METHOCARBAMOL 500 MG: 500 TABLET ORAL at 17:13

## 2024-06-25 RX ADMIN — ACETAMINOPHEN 650 MG: 325 TABLET, FILM COATED ORAL at 05:01

## 2024-06-25 RX ADMIN — ONDANSETRON 4 MG: 2 INJECTION INTRAMUSCULAR; INTRAVENOUS at 05:11

## 2024-06-25 RX ADMIN — SODIUM CHLORIDE, SODIUM LACTATE, POTASSIUM CHLORIDE, AND CALCIUM CHLORIDE 100 ML/HR: .6; .31; .03; .02 INJECTION, SOLUTION INTRAVENOUS at 06:30

## 2024-06-25 RX ADMIN — GABAPENTIN 300 MG: 300 CAPSULE ORAL at 20:45

## 2024-06-25 RX ADMIN — INSULIN GLARGINE 20 UNITS: 100 INJECTION, SOLUTION SUBCUTANEOUS at 22:15

## 2024-06-25 RX ADMIN — PANTOPRAZOLE SODIUM 40 MG: 40 INJECTION, POWDER, FOR SOLUTION INTRAVENOUS at 08:02

## 2024-06-25 NOTE — PLAN OF CARE
Problem: PHYSICAL THERAPY ADULT  Goal: Performs mobility at highest level of function for planned discharge setting.  See evaluation for individualized goals.  Description: Treatment/Interventions: ADL retraining, Functional transfer training, LE strengthening/ROM, Therapeutic exercise, Endurance training, Patient/family training, Equipment eval/education, Bed mobility, Gait training, Spoke to nursing, Spoke to case management, OT, Elevations          See flowsheet documentation for full assessment, interventions and recommendations.  Outcome: Progressing  Note: Prognosis: Good  Problem List: Decreased endurance, Impaired balance, Decreased mobility, Pain  Assessment: Pt is a 58 y.o. male seen for a high complexity PT evaluation due to Ongoing medical management for primary dx, Increased reliance on more restrictive AD compared to baseline, Decreased activity tolerance compared to baseline, Fall risk, Ongoing telemetry monitoring, s/p surgical intervention. Patient is s/p [facility] to [facility]24/[facility]24 for Primary adenocarcinoma of ascending colon (HCC) (C18.2). Patient  has a past medical history of Anemia, CAD (coronary artery disease), Diabetes mellitus (HCC), GERD (gastroesophageal reflux disease), Hyperlipidemia, Hypertension, and Obesity (BMI 30-39.9)..     PT now consulted to assess functional mobility and needs for safe d/c planning. Prior to admission, pt independent with functional mobility, independent ADLs, and independent IADLs. Personal factors affecting status include fall risk, steps to enter home, steps to negotiate within home, and medical status     Currently pt requires supervision for bed mobility, minimal assistance x1 for functional transfers with one hand hold ; supervision for ambulation with one hand hold. Pt presents functioning below baseline and w/ overall mobility deficits 2* to: decreased strength, decreased endurance, decreased mobility, impaired balance. These  impairments place pt at risk for falls.     Pt will continue to benefit from skilled PT interventions to address stated impairments; to maximize functional potential; for ongoing pt/family education; and DME needs. The patient's AM-PAC Basic Mobility Inpatient Short Form Raw Score Is 18. PT is currently recommending no post acute rehab needs on d/c from hospital. Will continue to follow as able.        Rehab Resource Intensity Level, PT: No post-acute rehabilitation needs (none anticipated)    See flowsheet documentation for full assessment.

## 2024-06-25 NOTE — UTILIZATION REVIEW
Initial Clinical Review    Elective Inpatient surgical procedure  Age/Sex: 58 y.o. male  Surgery Date: 06/24/2024  Procedure:   Diagnostic laparoscopy  -Robotic right hemicolectomy  -Ileocolic, ileum to mid transverse colon, side-to-side, functional end-to-end stapled anastomosis  -Intraoperative fluorescence angiography  Anesthesia: General  Operative Findings:   -Ascending colon tattoo located,ileum to transverse colon, side-to-side functional end-to-end stapled ileocolic anastomosis,good fluorescence Firefly proximal to stapler, ileum and transverse colon     -Arm 1 Suprapubic off midline to Right,8mm port  Force Bipolar  -Arm 2 Supraumbilical 12mm port camera, eventually enlarged to extraction incision  -Arm 3-left midepigastric vessel sealer, 8mm port  -Arm4-LUQ TipUp 8mm  -Assist LUQ 5mm port    POD#1 Progress Note: Pt reports lower abd pain. Did not tolerate CLD, had n/v when tried. Still having nausea this morning. No BM, no flatus. Red removed 30 minutes ago, has not voided yet since. Ambulating.   Plan: maintain diabetic CLD as tolerated. mIVF@100. d/c Sandra  d/c Red. maintain PCA. endo consult. DVT ppx.     Admission Orders: Date/Time/Statement:   Admission Orders (From admission, onward)       Ordered        06/24/24 1044  Inpatient Admission  Once                          Orders Placed This Encounter   Procedures    Inpatient Admission     Standing Status:   Standing     Number of Occurrences:   1     Order Specific Question:   Level of Care     Answer:   Level 1 Stepdown [13]     Order Specific Question:   Estimated length of stay     Answer:   More than 2 Midnights     Order Specific Question:   Certification     Answer:   I certify that inpatient services are medically necessary for this patient for a duration of greater than two midnights. See H&P and MD Progress Notes for additional information about the patient's course of treatment.       Vital Signs (last 3 days)       Date/Time Temp Pulse  Resp BP MAP (mmHg) SpO2 Calculated FIO2 (%) - Nasal Cannula O2 Flow Rate (L/min) Nasal Cannula O2 Flow Rate (L/min) O2 Device Cardiac (WDL) Patient Position - Orthostatic VS Pain    06/25/24 07:07:37 98.3 °F (36.8 °C) 65 15 144/82 103 97 % -- -- -- -- -- Sitting --    06/25/24 0501 -- -- -- -- -- -- -- -- -- -- -- -- 3    06/25/24 0310 -- -- -- -- -- -- 28 -- 2 L/min Nasal cannula -- -- 3    06/25/24 02:32:22 98 °F (36.7 °C) 64 20 118/77 91 98 % 28 2 L/min 2 L/min Nasal cannula -- Sitting --    06/24/24 2310 -- -- -- -- -- -- -- 2 L/min -- Nasal cannula -- -- --    06/24/24 22:38:17 98.1 °F (36.7 °C) 79 15 128/75 93 98 % -- 3 L/min -- Nasal cannula -- Lying --    06/24/24 1915 -- -- -- -- -- -- -- 3 L/min -- Nasal cannula -- -- 3    06/24/24 1841 98.4 °F (36.9 °C) 77 16 131/62 89 95 % -- 3 L/min -- Nasal cannula -- Lying --    06/24/24 1823 -- -- -- 131/83 99 -- -- -- -- -- -- -- --    06/24/24 1703 -- -- -- -- -- -- -- -- -- -- -- -- 4 06/24/24 1600 -- -- -- -- -- -- -- 3 L/min -- Nasal cannula -- -- --    06/24/24 1500 98.6 °F (37 °C) 73 18 128/79 95 94 % -- -- -- Nasal cannula -- Lying 4 06/24/24 1455 -- 75 20 128/79 95 -- -- 3 L/min -- Nasal cannula -- -- --    06/24/24 13:54:18 -- -- -- 145/89 108 -- -- -- -- -- -- -- --    06/24/24 1329 -- -- -- -- -- 93 % -- 3 L/min -- Nasal cannula -- -- --    06/24/24 1313 -- -- -- -- -- -- -- -- -- -- -- -- 9 06/24/24 13:11:50 98.2 °F (36.8 °C) 78 16 135/86 102 94 % -- 3 L/min -- Nasal cannula -- -- --    06/24/24 13:10:26 -- -- -- 135/86 102 -- -- -- -- -- -- -- --    06/24/24 1308 -- -- -- -- -- -- -- -- -- -- -- -- 9    06/24/24 1230 -- 80 14 131/81 100 93 % -- 6 L/min -- Nasal cannula -- Lying 4 06/24/24 1215 -- -- 12 135/83 99 -- -- 6 L/min -- Nasal cannula -- Lying 4 06/24/24 1200 -- 76 9 127/74 91 94 % -- 6 L/min -- Nasal cannula -- Lying --    06/24/24 1154 -- 78 13 135/74 95 95 % -- 6 L/min -- Nasal cannula -- -- 8 06/24/24 1145 -- 78 12 135/74  95 95 % -- 6 L/min -- Nasal cannula -- Lying 7    06/24/24 1130 -- 78 9 129/73 92 94 % -- 6 L/min -- Nasal cannula -- Lying 6    06/24/24 1115 -- 80 22 135/79 98 93 % -- 6 L/min -- Nasal cannula -- Lying 4    06/24/24 1100 -- 84 8 145/81 102 95 % -- 6 L/min -- Simple mask -- Lying 4 06/24/24 1045 -- 86 10 132/70 98 91 % -- 6 L/min -- Simple mask -- Lying --    06/24/24 1035 98.5 °F (36.9 °C) 92 12 122/67 89 94 % -- 6 L/min -- Simple mask WDL Lying --    06/24/24 0705 -- -- -- -- -- -- -- -- -- -- -- -- No Pain    06/24/24 0627 97.6 °F (36.4 °C) 84 -- 118/71 -- 98 % -- -- -- None (Room air) -- -- --          Weight (last 2 days)       Date/Time Weight    06/24/24 1319 99.8 (220)    06/24/24 0705 100 (220.46)            Pertinent Labs/Diagnostic Test Results:   Radiology:  No orders to display     Cardiology:  No orders to display     GI:  No orders to display           Results from last 7 days   Lab Units 06/25/24  0511 06/24/24  1452 06/20/24  1645   WBC Thousand/uL 8.28  --  5.29   HEMOGLOBIN g/dL 9.4*  --  11.2*   HEMATOCRIT % 31.7*  --  37.9   PLATELETS Thousands/uL 170 149 224   TOTAL NEUT ABS Thousands/µL 6.84  --  3.01         Results from last 7 days   Lab Units 06/25/24  0511   SODIUM mmol/L 140   POTASSIUM mmol/L 4.0   CHLORIDE mmol/L 106   CO2 mmol/L 24   ANION GAP mmol/L 10   BUN mg/dL 16   CREATININE mg/dL 0.88   EGFR ml/min/1.73sq m 94   CALCIUM mg/dL 8.2*   MAGNESIUM mg/dL 1.7*   PHOSPHORUS mg/dL 4.2         Results from last 7 days   Lab Units 06/25/24  0710 06/24/24  2039 06/24/24  1538 06/24/24  1049 06/24/24  0634   POC GLUCOSE mg/dl 139 179* 221* 227* 160*     Results from last 7 days   Lab Units 06/25/24  0511   GLUCOSE RANDOM mg/dL 131         Results from last 7 days   Lab Units 06/24/24  1452   HEMOGLOBIN A1C % 6.0*   EAG mg/dl 126     Diet: Surgical; Cl liq toast Crax; No carbonation, consistent carbohydrate diet level  Mobility: Ambulate  DVT Prophylaxis: SCD, Heparin  SC    Medications/Pain Control:   Scheduled Medications:  acetaminophen, 650 mg, Oral, Q6H MERCEDES  atorvastatin, 80 mg, Oral, After Dinner  docusate sodium, 100 mg, Oral, BID  heparin (porcine), 5,000 Units, Subcutaneous, Q8H MERCEDES  insulin glargine, 20 Units, Subcutaneous, HS  insulin lispro, 1-5 Units, Subcutaneous, HS  insulin lispro, 1-6 Units, Subcutaneous, TID AC  methocarbamol, 500 mg, Oral, Q6H Counts include 234 beds at the [facility]  metoprolol succinate, 25 mg, Oral, Daily  pantoprazole, 40 mg, Intravenous, Q24H Counts include 234 beds at the [facility]      Continuous IV Infusions:  HYDROmorphone, , Intravenous, Continuous  lactated ringers, 75 mL/hr, Intravenous, Continuous      PRN Meds:  aluminum-magnesium hydroxide-simethicone, 30 mL, Oral, Q6H PRN  calcium carbonate, 1,000 mg, Oral, Daily PRN  diphenhydrAMINE, 25 mg, Intravenous, Q6H PRN  HYDROmorphone, 0.5 mg, Intravenous, Q3H PRN 06/24 x 1  lactated ringers, 1,000 mL, Intravenous, Once PRN   And  lactated ringers, 1,000 mL, Intravenous, Once PRN  naloxone, 0.04 mg, Intravenous, Q3 min PRN  nitroglycerin, 0.4 mg, Sublingual, Q5 Min PRN  ondansetron, 4 mg, Intravenous, Q6H PRN 06/24 x 2, 06/25 x 1  oxyCODONE, 10 mg, Oral, Q4H PRN  oxyCODONE, 5 mg, Oral, Q4H PRN  sodium chloride, 1,000 mL, Intravenous, Once PRN   And  sodium chloride, 1,000 mL, Intravenous, Once PRN        Network Utilization Review Department  ATTENTION: Please call with any questions or concerns to [phone] and carefully listen to the prompts so that you are directed to the right person. All voicemails are confidential.   For Discharge needs, contact Care Management DC Support Team at [phone] opt. 2  Send all requests for admission clinical reviews, approved or denied determinations and any other requests to dedicated fax number below belonging to the campus where the patient is receiving treatment. List of dedicated fax numbers for the Facilities:  FACILITY NAME UR FAX NUMBER   ADMISSION DENIALS (Administrative/Medical Necessity) [phone]    DISCHARGE SUPPORT TEAM (NETWORK) [phone]   PARENT CHILD HEALTH (Maternity/NICU/Pediatrics) [phone]   [facility] [phone]   [facility] [phone]   [facility] [phone]   [facility] [phone]   [facility] [phone]   [facility] [phone]   General acute hospital [phone]   [facility] [phone]   [facility] [phone]   [facility] [phone]   [facility] [phone]   [facility] [phone]

## 2024-06-25 NOTE — PHYSICAL THERAPY NOTE
Physical Therapy Evaluation     Patient's Name: Brent Hall    Admitting Diagnosis  Primary adenocarcinoma of ascending colon (HCC) [C18.2]    Problem List  Patient Active Problem List   Diagnosis    History of ST elevation myocardial infarction (STEMI)    Obesity    Type 2 diabetes mellitus without complication, with long-term current use of insulin (HCC)    Transaminitis    Coronary artery disease involving native coronary artery of native heart without angina pectoris    Gastroesophageal reflux disease without esophagitis    Other hyperlipidemia    Melena    Acute blood loss anemia    Colonic mass    Localized edema    Iron deficiency anemia due to chronic blood loss    Adenocarcinoma (HCC)    Chronic kidney disease (CKD) stage G2/A2, mildly decreased glomerular filtration rate (GFR) between 60-89 mL/min/1.73 square meter and albuminuria creatinine ratio between  mg/g       Past Medical History  Past Medical History:   Diagnosis Date    Anemia     CAD (coronary artery disease)     Diabetes mellitus (HCC)     GERD (gastroesophageal reflux disease)     Hyperlipidemia     Hypertension     Pt denies    Obesity (BMI 30-39.9)        Past Surgical History  Past Surgical History:   Procedure Laterality Date    CARDIAC CATHETERIZATION Left 12/11/2023    Procedure: Cardiac Left Heart Cath;  Surgeon: Nacho Salinas DO;  [facility];  Service: Cardiology    CARDIAC CATHETERIZATION N/A 12/11/2023    Procedure: Cardiac Coronary Angiogram;  Surgeon: Nacho Salinas DO;  [facility];  Service: Cardiology    CARDIAC CATHETERIZATION  12/11/2023    Procedure: Cardiac catheterization;  Surgeon: Nacho Salinas DO;  [facility];  Service: Cardiology    ULNAR NERVE REPAIR Left 2018    WRIST ARTHRODESIS Bilateral     2019, 2014 06/25/24 0924   PT Last Visit   PT Visit Date 06/25/24   Note Type   Note type Evaluation   Pain Assessment   Pain  Assessment Tool 0-10   Pain Score 4   Pain Location/Orientation Location: Abdomen   Pain Onset/Description Onset: Ongoing;Frequency: Constant/Continuous;Descriptor: Discomfort   Effect of Pain on Daily Activities limits comfort and mobility   Patient's Stated Pain Goal No pain   Hospital Pain Intervention(s) Repositioned;Ambulation/increased activity;Emotional support   Restrictions/Precautions   Weight Bearing Precautions Per Order No   Other Precautions Telemetry;Fall Risk;Multiple lines   Home Living   Type of Home House   Home Layout Two level;Bed/bath upstairs;Stairs to enter with rails;Stairs to enter without rails  (3STE, FFOS to 2nd)   Bathroom Shower/Tub Tub/shower unit   Bathroom Toilet Standard   Bathroom Accessibility Accessible   Home Equipment Cane  (not used PTA)   Prior Function   Level of [place] Independent with ADLs;Independent with functional mobility;Independent with IADLS   Lives With Spouse;Son   Receives Help From Family   IADLs Independent with driving;Independent with meal prep;Independent with medication management   Falls in the last 6 months 0   Vocational Full time employment   Cognition   Overall Cognitive Status WFL   Arousal/Participation Alert   Attention Within functional limits   Orientation Level Oriented X4   Memory Within functional limits   Following Commands Follows all commands and directions without difficulty   Comments Patient pleasant and cooperative   Subjective   Subjective Patient agreeable to PT eval   RUE Assessment   RUE Assessment WFL   LUE Assessment   LUE Assessment WFL   RLE Assessment   RLE Assessment WFL   LLE Assessment   LLE Assessment WFL   Bed Mobility   Supine to Sit 5  Supervision   Additional items HOB elevated;Verbal cues;Increased time required   Sit to Supine 5  Supervision   Additional items Increased time required;Verbal cues;HOB elevated   Transfers   Sit to Stand 4  Minimal assistance   Additional items Assist x 1;Increased time  required;Verbal cues   Stand to Sit 4  Minimal assistance   Additional items Assist x 1;Increased time required;Verbal cues   Additional Comments HHA   Ambulation/Elevation   Gait pattern Short stride   Gait Assistance 5  Supervision   Additional items Verbal cues   Assistive Device None  (HHA)   Distance 75'x2   Balance   Static Sitting Good   Dynamic Sitting Fair +   Static Standing Fair   Dynamic Standing Fair -   Ambulatory Fair -   Endurance Deficit   Endurance Deficit Yes   Endurance Deficit Description fatigue, weakness   Activity Tolerance   Activity Tolerance Patient limited by pain   Medical Staff Made Aware OT, OTS, SPT   Nurse Made Aware RN cleared   Assessment   Prognosis Good   Problem List Decreased endurance;Impaired balance;Decreased mobility;Pain   Assessment Pt is a 58 y.o. male seen for a high complexity PT evaluation due to Ongoing medical management for primary dx, Increased reliance on more restrictive AD compared to baseline, Decreased activity tolerance compared to baseline, Fall risk, Ongoing telemetry monitoring, s/p surgical intervention. Patient is s/p admit to [facility] on 6/24/2024 for Primary adenocarcinoma of ascending colon (HCC) (C18.2). Patient  has a past medical history of Anemia, CAD (coronary artery disease), Diabetes mellitus (HCC), GERD (gastroesophageal reflux disease), Hyperlipidemia, Hypertension, and Obesity (BMI 30-39.9)..     PT now consulted to assess functional mobility and needs for safe d/c planning. Prior to admission, pt independent with functional mobility, independent ADLs, and independent IADLs. Personal factors affecting status include fall risk, steps to enter home, steps to negotiate within home, and medical status     Currently pt requires supervision for bed mobility, minimal assistance x1 for functional transfers with one hand hold ; supervision for ambulation with one hand hold. Pt presents functioning below baseline and w/ overall mobility  deficits 2* to: decreased strength, decreased endurance, decreased mobility, impaired balance. These impairments place pt at risk for falls.     Pt will continue to benefit from skilled PT interventions to address stated impairments; to maximize functional potential; for ongoing pt/family education; and DME needs. The patient's [facility] Basic Mobility Inpatient Short Form Raw Score Is 18. PT is currently recommending no post acute rehab needs on d/c from hospital. Will continue to follow as able.   Goals   Patient Goals to go home   STG Expiration Date 07/09/24   Short Term Goal #1 In 14 days, patient will 1) improve bed mobility to MI for improved mobility and decreased need for assist 2) sit EOB x30' with MI to facilitate trunk stability and safety for completion of ADL tasks 3) increase functional transfers to MI for improved safety and functional mobility 4) ambulate 250ft with MI using no AD for increased endurance and safety ambulating home and community environments 5) improve balance by 1 grade for improved safety and stability and decreased risk for falls. 6) ascend/descend at least 12 stairs using HR with MI in order to safely access home environment   PT Treatment Day 0   Plan   Treatment/Interventions ADL retraining;Functional transfer training;LE strengthening/ROM;Therapeutic exercise;Endurance training;Patient/family training;Equipment eval/education;Bed mobility;Gait training;Spoke to nursing;Spoke to case management;OT;Elevations   PT Frequency 2-3x/wk   Discharge Recommendation   Rehab Resource Intensity Level, PT No post-acute rehabilitation needs  (none anticipated)   [facility] Basic Mobility Inpatient   Turning in Flat Bed Without Bedrails 3   Lying on Back to Sitting on Edge of Flat Bed Without Bedrails 3   Moving Bed to Chair 3   Standing Up From Chair Using Arms 3   Walk in Room 3   Climb 3-5 Stairs With Railing 3   Basic Mobility Inpatient Raw Score 18   Basic Mobility Standardized Score 41.05    Tomás Christian Highest Level Of Mobility   -[facility] Goal 6: Walk 10 steps or more   -HLM Achieved 7: Walk 25 feet or more   Modified [place] Scale   Modified [place] Scale 3   End of Consult   Patient Position at End of Consult All needs within reach;Supine         Nicci Lynch, PT, DPT

## 2024-06-25 NOTE — OCCUPATIONAL THERAPY NOTE
Occupational Therapy Evaluation     Patient Name: Brent Hall  Today's Date: 6/25/2024  Problem List  Active Problems:  There are no active Hospital Problems.    Past Medical History  Past Medical History:   Diagnosis Date    Anemia     CAD (coronary artery disease)     Diabetes mellitus (HCC)     GERD (gastroesophageal reflux disease)     Hyperlipidemia     Hypertension     Pt denies    Obesity (BMI 30-39.9)      Past Surgical History  Past Surgical History:   Procedure Laterality Date    CARDIAC CATHETERIZATION Left 12/11/2023    Procedure: Cardiac Left Heart Cath;  Surgeon: Nacho Salinas DO;  [facility];  Service: Cardiology    CARDIAC CATHETERIZATION N/A 12/11/2023    Procedure: Cardiac Coronary Angiogram;  Surgeon: Nacho Salinas DO;  [facility];  Service: Cardiology    CARDIAC CATHETERIZATION  12/11/2023    Procedure: Cardiac catheterization;  Surgeon: Nacho Salinas DO;  [facility];  Service: Cardiology    ULNAR NERVE REPAIR Left 2018    WRIST ARTHRODESIS Bilateral     2019, 2014 06/25/24 0925   OT Last Visit   OT Visit Date 06/24/24   Note Type   Note type Evaluation   Pain Assessment   Pain Assessment Tool 0-10   Pain Score 4   Pain Location/Orientation Location: Abdomen   Pain Onset/Description Descriptor: Discomfort   Effect of Pain on Daily Activities limits pts ability to complete ADL tasks   Patient's Stated Pain Goal No pain   Restrictions/Precautions   Weight Bearing Precautions Per Order No   Other Precautions Telemetry;Fall Risk;Multiple lines   Home Living   Type of Home House  (3 MEG)   Home Layout Two level;Stairs to enter with rails;Bed/bath upstairs  (FFOS to access 2nd and 3rd level. bed/bathroom on 2nd floor)   Bathroom Shower/Tub Tub/shower unit   Bathroom Toilet Standard   Bathroom Equipment   (pt denies)   Bathroom Accessibility Accessible   Home Equipment Cane  (pt denies use at baseline)   Prior  "Function   Level of [place] Independent with ADLs;Independent with functional mobility;Independent with IADLS   Lives With Spouse;Son  (spouse able to A as needed)   Receives Help From Family  (spouse able to A as needed)   IADLs Independent with driving;Independent with meal prep;Independent with medication management   Falls in the last 6 months 0   Vocational Full time employment  (collision estimates)   Lifestyle   Autonomy pta, I ADLs, I IADLs, I FM no AD, +   Reciprocal Relationships spouse   Service to Others FT employee   Intrinsic Gratification riding motorcycle   Subjective   Subjective \"My wife and I ride motorcycles together\"   ADL   Where Assessed Edge of bed   Eating Assistance 6  Modified independent  (A levels 2* to pain, anticipate as pain decreases, A levels will also decrease)   Grooming Assistance 5  Supervision/Setup   UB Bathing Assistance 5  Supervision/Setup   LB Bathing Assistance 4  Minimal Assistance   UB Dressing Assistance 5  Supervision/Setup   LB Dressing Assistance 4  Minimal Assistance   Toileting Assistance  4  Minimal Assistance   Functional Assistance 4  Minimal Assistance   Bed Mobility   Supine to Sit 5  Supervision   Additional items HOB elevated;Bedrails;Increased time required;Verbal cues   Sit to Supine 5  Supervision   Additional items HOB elevated;Increased time required;LE management   Additional Comments Pt found supine in bed, pt left supine in bed with all needs in reach.   Transfers   Sit to Stand 4  Minimal assistance   Additional items Assist x 1;Increased time required;Verbal cues  (HHA, A levels 2* to pain)   Stand to Sit 4  Minimal assistance   Additional items Assist x 1;Increased time required;Verbal cues  (HHA, A levels 2* to pain)   Additional Comments Pt required min ax1 with HHA to perform sit<>stand transfers. Additional A needed for line mgmt, A levels 2* to pain   Functional Mobility   Functional Mobility 4  Minimal assistance   Additional " Comments Pt required min ax1 to complete FM with HHA, A levels 2* to pain   Additional items Hand hold assistance   Balance   Static Sitting Good   Dynamic Sitting Fair +   Static Standing Fair   Dynamic Standing Fair -   Ambulatory Poor +   Activity Tolerance   Activity Tolerance Patient limited by pain;Patient tolerated treatment well   Medical Staff Made Aware PT 2* to pt's med complexitites, comorbidities, and regression from baseline   Nurse Made Aware RN cleared pt for therapy   RUE Assessment   RUE Assessment WFL   LUE Assessment   LUE Assessment WFL   Hand Function   Gross Motor Coordination Functional   Fine Motor Coordination Functional   Psychosocial   Psychosocial (WDL) WDL   Cognition   Overall Cognitive Status WFL   Arousal/Participation Alert;Cooperative   Attention Within functional limits   Orientation Level Oriented X4   Memory Within functional limits   Following Commands Follows one step commands without difficulty   Comments Pt was alert and cooperative throughout OT eval. Pt with good insight to condition and safety awareness.   Assessment   Prognosis Good   Assessment Pt is a 58 y.o. male admitted 6/24/2021 for scheduled diagnostic laparoscopy, robotic R hemicolectomy, intra-op fluorescence angiography. Pt is 1 day post op. Pt with active OT eval and treat orders. Pt  has a past medical history of Anemia, CAD (coronary artery disease), Diabetes mellitus (HCC), GERD (gastroesophageal reflux disease), Hyperlipidemia, Hypertension, and Obesity (BMI 30-39.9).  Pt lives with spouse and son in a 2 story house with 3 MEG. Pt has tib/shower, standard toilet and no other bathroom AD. PTA, pt was I with UB ADL, I LB ADL and completed transfer/FM I with no AD, +. Currently, Pt is S UB ADL, min a LB ADL, and performs transfers/FM with min A with HHA, A levels 2* to pain, anticipate as pain decreases, A levels will also decrease. The patient's raw score on the AM-PAC Daily Activity Inpatient Short  Form is 20. A raw score of greater than or equal to 19 suggests the patient may benefit from discharge to home. Please refer to the recommendation of the Occupational Therapist for safe discharge planning. From OT standpoint, pt should discharge from acute care services. No OT is warranted at this time   Goals   Patient Goals to go home   AM-PAC Daily Activity Inpatient   Lower Body Dressing 3  (AMPAC levels 2* to pain)   Bathing 3   Toileting 3   Upper Body Dressing 3   Grooming 4   Eating 4   Daily Activity Raw Score 20   Daily Activity Standardized Score (Calc for Raw Score >=11) 42.03   AM-PAC Applied Cognition Inpatient   Following a Speech/Presentation 4   Understanding Ordinary Conversation 4   Taking Medications 4   Remembering Where Things Are Placed or Put Away 4   Remembering List of 4-5 Errands 4   Taking Care of Complicated Tasks 4   Applied Cognition Raw Score 24   Applied Cognition Standardized Score 62.21   Modified [place] Scale   Modified [place]a Scale 2   End of Consult   Education Provided Yes   Patient Position at End of Consult Supine;All needs within reach   Nurse Communication Nurse aware of consult   FORTINO Mayers

## 2024-06-26 VITALS
RESPIRATION RATE: 14 BRPM | BODY MASS INDEX: 31.5 KG/M2 | DIASTOLIC BLOOD PRESSURE: 82 MMHG | HEART RATE: 119 BPM | OXYGEN SATURATION: 90 % | SYSTOLIC BLOOD PRESSURE: 125 MMHG | TEMPERATURE: 99.5 F | HEIGHT: 70 IN | WEIGHT: 220 LBS

## 2024-06-26 LAB
ANION GAP SERPL CALCULATED.3IONS-SCNC: 8 MMOL/L (ref 4–13)
BUN SERPL-MCNC: 15 MG/DL (ref 5–25)
CALCIUM SERPL-MCNC: 8.2 MG/DL (ref 8.4–10.2)
CHLORIDE SERPL-SCNC: 105 MMOL/L (ref 96–108)
CO2 SERPL-SCNC: 26 MMOL/L (ref 21–32)
CREAT SERPL-MCNC: 0.96 MG/DL (ref 0.6–1.3)
GFR SERPL CREATININE-BSD FRML MDRD: 86 ML/MIN/1.73SQ M
GLUCOSE SERPL-MCNC: 113 MG/DL (ref 65–140)
GLUCOSE SERPL-MCNC: 116 MG/DL (ref 65–140)
GLUCOSE SERPL-MCNC: 178 MG/DL (ref 65–140)
MAGNESIUM SERPL-MCNC: 2 MG/DL (ref 1.9–2.7)
POTASSIUM SERPL-SCNC: 4 MMOL/L (ref 3.5–5.3)
SODIUM SERPL-SCNC: 139 MMOL/L (ref 135–147)

## 2024-06-26 PROCEDURE — 99024 POSTOP FOLLOW-UP VISIT: CPT | Performed by: COLON & RECTAL SURGERY

## 2024-06-26 PROCEDURE — NC001 PR NO CHARGE: Performed by: COLON & RECTAL SURGERY

## 2024-06-26 PROCEDURE — 82948 REAGENT STRIP/BLOOD GLUCOSE: CPT

## 2024-06-26 PROCEDURE — 99232 SBSQ HOSP IP/OBS MODERATE 35: CPT | Performed by: INTERNAL MEDICINE

## 2024-06-26 PROCEDURE — 88309 TISSUE EXAM BY PATHOLOGIST: CPT | Performed by: PATHOLOGY

## 2024-06-26 PROCEDURE — 83735 ASSAY OF MAGNESIUM: CPT | Performed by: PHYSICIAN ASSISTANT

## 2024-06-26 PROCEDURE — 80048 BASIC METABOLIC PNL TOTAL CA: CPT | Performed by: PHYSICIAN ASSISTANT

## 2024-06-26 RX ORDER — ENOXAPARIN SODIUM 100 MG/ML
40 INJECTION SUBCUTANEOUS
Qty: 10.4 ML | Refills: 0 | Status: SHIPPED | OUTPATIENT
Start: 2024-06-26 | End: 2024-07-22

## 2024-06-26 RX ORDER — GABAPENTIN 100 MG/1
100 CAPSULE ORAL 3 TIMES DAILY
Status: DISCONTINUED | OUTPATIENT
Start: 2024-06-26 | End: 2024-06-26 | Stop reason: HOSPADM

## 2024-06-26 RX ORDER — LIDOCAINE 50 MG/G
1 PATCH TOPICAL DAILY
Qty: 7 PATCH | Refills: 0 | Status: SHIPPED | OUTPATIENT
Start: 2024-06-27 | End: 2024-07-04

## 2024-06-26 RX ORDER — DOCUSATE SODIUM 100 MG/1
100 CAPSULE, LIQUID FILLED ORAL 2 TIMES DAILY
COMMUNITY
Start: 2024-06-26

## 2024-06-26 RX ORDER — ACETAMINOPHEN 325 MG/1
975 TABLET ORAL EVERY 8 HOURS SCHEDULED
COMMUNITY
Start: 2024-06-26

## 2024-06-26 RX ORDER — GABAPENTIN 100 MG/1
100 CAPSULE ORAL 3 TIMES DAILY
Qty: 21 CAPSULE | Refills: 0 | Status: SHIPPED | OUTPATIENT
Start: 2024-06-26 | End: 2024-07-03

## 2024-06-26 RX ORDER — ACETAMINOPHEN 325 MG/1
975 TABLET ORAL EVERY 8 HOURS SCHEDULED
Status: DISCONTINUED | OUTPATIENT
Start: 2024-06-26 | End: 2024-06-26 | Stop reason: HOSPADM

## 2024-06-26 RX ORDER — PANTOPRAZOLE SODIUM 40 MG/1
40 TABLET, DELAYED RELEASE ORAL
Status: DISCONTINUED | OUTPATIENT
Start: 2024-06-26 | End: 2024-06-26 | Stop reason: HOSPADM

## 2024-06-26 RX ORDER — ENOXAPARIN SODIUM 100 MG/ML
40 INJECTION SUBCUTANEOUS
Status: DISCONTINUED | OUTPATIENT
Start: 2024-06-26 | End: 2024-06-26 | Stop reason: HOSPADM

## 2024-06-26 RX ORDER — METHOCARBAMOL 500 MG/1
500 TABLET, FILM COATED ORAL EVERY 6 HOURS SCHEDULED
Qty: 28 TABLET | Refills: 0 | Status: SHIPPED | OUTPATIENT
Start: 2024-06-26 | End: 2024-07-03

## 2024-06-26 RX ADMIN — METHOCARBAMOL 500 MG: 500 TABLET ORAL at 12:51

## 2024-06-26 RX ADMIN — PANTOPRAZOLE SODIUM 40 MG: 40 TABLET, DELAYED RELEASE ORAL at 09:48

## 2024-06-26 RX ADMIN — DOCUSATE SODIUM 100 MG: 100 CAPSULE, LIQUID FILLED ORAL at 09:48

## 2024-06-26 RX ADMIN — INSULIN LISPRO 1 UNITS: 100 INJECTION, SOLUTION INTRAVENOUS; SUBCUTANEOUS at 12:51

## 2024-06-26 RX ADMIN — ACETAMINOPHEN 650 MG: 325 TABLET, FILM COATED ORAL at 05:07

## 2024-06-26 RX ADMIN — METHOCARBAMOL 500 MG: 500 TABLET ORAL at 05:07

## 2024-06-26 RX ADMIN — GABAPENTIN 100 MG: 100 CAPSULE ORAL at 09:48

## 2024-06-26 RX ADMIN — METOPROLOL SUCCINATE 25 MG: 25 TABLET, EXTENDED RELEASE ORAL at 09:48

## 2024-06-26 RX ADMIN — HEPARIN SODIUM 5000 UNITS: 5000 INJECTION INTRAVENOUS; SUBCUTANEOUS at 05:07

## 2024-06-26 NOTE — CASE MANAGEMENT
Case Management Discharge Planning Note    Patient name Brent Hall  Location [facility] 928/[facility] 928-01 MRN [id number]  : 1965 Date 2024       Current Admission Date: 2024  Current Admission Diagnosis:Primary adenocarcinoma of ascending colon (HCC)   Patient Active Problem List    Diagnosis Date Noted Date Diagnosed    Chronic kidney disease (CKD) stage G2/A2, mildly decreased glomerular filtration rate (GFR) between 60-89 mL/min/1.73 square meter and albuminuria creatinine ratio between  mg/g 2024     Iron deficiency anemia due to chronic blood loss 2024     Adenocarcinoma (HCC) 2024     Colonic mass 2024     Localized edema 2024     Melena 2024     Acute blood loss anemia 2024     Other hyperlipidemia 2024     Coronary artery disease involving native coronary artery of native heart without angina pectoris 2023     Gastroesophageal reflux disease without esophagitis 2023     Transaminitis 12/10/2023     History of ST elevation myocardial infarction (STEMI) 2023     Obesity 2023     Type 2 diabetes mellitus without complication, with long-term current use of insulin (HCC) 2023       LOS (days): 2  Geometric Mean LOS (GMLOS) (days): 2.9  Days to GMLOS:1     OBJECTIVE:  Risk of Unplanned Readmission Score: 14.83         Current admission status: Inpatient   Preferred Pharmacy:   [facility]/pharmacy #[id number] [place] [address]  Phone: [phone] Fax: [phone]    Primary Care Provider: Oscar Call MD    Primary Insurance: [employer]  Secondary Insurance:     DISCHARGE DETAILS:                                  Additional Comments: CM reviewed pt chart. Pt has no CM discharge support needs at this time. POD # 1 robotic R hemicolectomy, intra-op fluorescence angiography. CM will continue to follow as needed.

## 2024-06-26 NOTE — PROGRESS NOTES
Education and demonstration provided for Lovenox injections.  Patient and wife able to discuss appropriate steps and both expressed confidence in understanding.

## 2024-06-26 NOTE — PROGRESS NOTES
"Progress Note - Colorectal Surgery  Brent Hall 58 y.o. male MRN: [id number]  Unit/Bed#: [facility] 928-01 Encounter: [id number]    Assessment:  58M s/p 6/24 dxlap, robotic R hemicolectomy, intra-op fluorescence angiography.     Afebrile.VSS.  On 2L of NC  UOP 2550  AM labs pending    Plan:  Low residue diet  DC fluids  DC PCA  Wean O2  PRN pain meds  PRN anti nausea meds  DVT prophylaxis  PT/OT: no rehab needs  Dispo planning    Subjective/Objective     Subjective:   No acute events overnight.  Patient endorses moderate abdominal pain.  Denies having nausea, vomiting, fevers, chills, chest pain, shortness of breath.  Tolerating oral diet.  Voiding without difficulty.  No bowel movements but passing flatus.    Objective:     Blood pressure 129/71, pulse 85, temperature 98.6 °F (37 °C), resp. rate 17, height 5' 10\" (1.778 m), weight 99.8 kg (220 lb), SpO2 90%.,Body mass index is 31.57 kg/m².      Intake/Output Summary (Last 24 hours) at 6/26/2024 0551  Last data filed at 6/26/2024 0501  Gross per 24 hour   Intake 2579.53 ml   Output 2550 ml   Net 29.53 ml       Invasive Devices       Peripheral Intravenous Line  Duration             Peripheral IV 06/25/24 Right Antecubital <1 day                    General: NAD  HENT: NCAT MMM  Neck: supple, no JVD  CV: nl rate  Lungs: nl wob. No resp distress  ABD: Soft, nontender, nondistended. Incisions are clean/dry/intact.  Extrem: No CCE  Neuro: AAOx3       Scheduled Meds:  Current Facility-Administered Medications   Medication Dose Route Frequency Provider Last Rate    acetaminophen  650 mg Oral Q6H [facility] Yeyo Menezes MD      aluminum-magnesium hydroxide-simethicone  30 mL Oral Q6H PRN Justine Casarez PA-C      atorvastatin  80 mg Oral After Dinner Justine Casarez PA-C      calcium carbonate  1,000 mg Oral Daily PRN Justine Casarez PA-C      diphenhydrAMINE  25 mg Intravenous Q6H PRN Justine Zarenkiewicz, PA-C      docusate sodium  100 mg Oral BID Justine" SHAYY Casarez      gabapentin  300 mg Oral TID Yeyo Menezes MD      heparin (porcine)  5,000 Units Subcutaneous Q8H [facility] Justine Casarez PA-C      HYDROmorphone   Intravenous Continuous Eli Martinez PA-C      HYDROmorphone  0.5 mg Intravenous Q3H PRN Eli Martinez PA-C      ibuprofen  600 mg Oral Q6H PRN Yeyo Menezes MD      insulin glargine  20 Units Subcutaneous HS Jocelyn Noonan DO      insulin lispro  1-5 Units Subcutaneous HS Justine Casarez PA-C      insulin lispro  1-6 Units Subcutaneous TID AC Azam Carreon MD      lactated ringers  75 mL/hr Intravenous Continuous Eli Martinez PA-C 75 mL/hr (06/25/24 1944)    lidocaine  1 patch Topical Daily Yeyo Menezes MD      methocarbamol  500 mg Oral Q6H [facility] Eli Martinez PA-C      metoprolol succinate  25 mg Oral Daily Justine Casarez PA-C      naloxone  0.04 mg Intravenous Q3 min PRN Justine Casarez PA-C      nitroglycerin  0.4 mg Sublingual Q5 Min PRN Justine Casarez PA-C      ondansetron  4 mg Intravenous Q6H PRN Justine Casarez PA-C      pantoprazole  40 mg Intravenous Q24H [facility] Eli Martinez PA-C      promethazine  12.5 mg Intravenous Q6H PRN Yeyo Menezes MD      simethicone  80 mg Oral Q8H PRN Eli Martienz PA-C       Continuous Infusions:HYDROmorphone,   lactated ringers, 75 mL/hr, Last Rate: 75 mL/hr (06/25/24 1944)      PRN Meds:.  aluminum-magnesium hydroxide-simethicone    calcium carbonate    diphenhydrAMINE    HYDROmorphone    ibuprofen    naloxone    nitroglycerin    ondansetron    promethazine    simethicone    Labs:                Lab, Imaging and other studies:I have personally reviewed pertinent lab results.    VTE Pharmacologic Prophylaxis: Heparin  VTE Mechanical Prophylaxis: sequential compression device      Yeyo Menezes MD  6/26/2024 5:51 AM

## 2024-06-26 NOTE — PROGRESS NOTES
"Endocrinoloy Progress Note   Brent Hall 58 y.o. male MRN: [id number]  Unit/Bed#: [facility] 928-01 Encounter: [id number]      CC: diabetes f/u    Subjective:   Brent Hall is a 58 y.o. year old male with now well-controlled type 2 diabetes, admitted for elective right hemicolectomy on 6/24 for recently diagnosed adenocarcinoma of colon.    Patient tolerating diet, for discharge today.  Feels well.  No complaints.  No hypoglycemia.    Objective:     Vitals: Blood pressure 125/82, pulse (!) 119, temperature 99.5 °F (37.5 °C), resp. rate 14, height 5' 10\" (1.778 m), weight 99.8 kg (220 lb), SpO2 90%.,Body mass index is 31.57 kg/m².      Intake/Output Summary (Last 24 hours) at 6/26/2024 1329  Last data filed at 6/26/2024 0958  Gross per 24 hour   Intake 1950.7 ml   Output 2375 ml   Net -424.3 ml       Physical Exam:  General Appearance: awake, appears stated age and cooperative  Head: Normocephalic, without obvious abnormality, atraumatic  Extremities: moves all extremities  Skin: Skin color and temperature normal.   Pulm: no labored breathing    Lab, Imaging and other studies: I have personally reviewed pertinent reports.      POC Glucose (mg/dl)   Date Value   06/26/2024 178 (H)   06/26/2024 113   06/25/2024 114   06/25/2024 134   06/25/2024 139   06/25/2024 139   06/24/2024 179 (H)   06/24/2024 221 (H)   06/24/2024 227 (H)   06/24/2024 160 (H)       Assessment and plan:  -Diabetes Mellitus    -A1c 6.6% May 2024   -Home regimen Lantus 27 units daily, Fiasp 3/5/5 with meals  -Adenocarcinoma of colon s/p right hemicolectomy 6/24    --Recommend discharge on Lantus 20 units nightly, Fiasp 3 units with meals  --He may increase this dosing slowly back to his normal Lantus 27u.  Fiasp 3/5/5 as appetite increases and if sugars start to rise  --Patient will follow-up with his endocrinologist Dr. Guera Noonan  Endocrinology PGY-4    Please EpicSecureChat questions to the physician covering the \"[facility] Endocrinology Call\" " Role. Thank you.

## 2024-06-26 NOTE — DISCHARGE SUMMARY
Discharge Summary - Colorectal Surgery   Brent Hall 58 y.o. male MRN: [id number]  [facility] Encounter: [id number]        Admitting Diagnosis:Ascending colon adenocarcinoma    Admit Date: 6/24/24    Discharge Diagnosis: Same    Discharge Date:6/26/24    HPI: Brent is a 58-year-old gentleman who was found to have an adenocarcinoma of the descending colon after his colonoscopy on 4/16/2024 by Dr. Craig.  There appeared a fungating ulcerated mass measuring 4 cm x 5 cm within the ascending colon.  Patient now is being admitted for surgical resection.    Procedures Performed: 6/24/24 Robotic laparoscopic right hemicolectomy, SPY angiography - Dr. Carreon       [facility] Course: Patient has done extremely well postoperatively.  Oxycodone however does not sit well with the patient and therefore he was started on the multimodal pain regimen of Tylenol 975 every 8 hours along with Robaxin 500 mg every 6 hours and gabapentin 100 mg every 8 hours.  Patient has been up and ambulating the halls well we had discontinued his PCA since he was not using it and then Dilaudid makes him feel nauseated.  Patient was kept on a sliding scale for his insulin and was given Lantus 20 units subcu at night.  Patient's blood sugars have been well-controlled at 114, 134, 139.  Patient is using his incentive spirometer.  His abdominal incisions are clean and dry, minimal tenderness on abdominal exam.  Patient did have ecchymosis with edema of his right upper and lower eyelid postsurgery, anesthesia did come to evaluate.  Patient is being discharged home today and followed with Dr. Carreon in the [facility] office on 8/12/2024 220 in the afternoon.  Discharge instructions were given to the patient as well as his wife by myself.  Patient will be discharged home on Lovenox 40 mg subcu daily for 26 more days.  Patient is not to resume his Plavix until Saturday, 6/29/2024.  Scripts were sent to [facility] pharmacy in [place] [address]  [facility]. for Robaxin 500 mg every 6 hours for 7 days and no refills, gabapentin 100 mg every 8 hours for 7 days and no refills, he is to resume his Tylenol he can take extra strength Tylenol 1000 mg every 8 hours, and the Lovenox was also sent there.  Patient was given Lovenox teaching prior to discharge.  If there is any fevers of 101.5 or higher increasing abdominal pain or vomiting he is not to hesitate to call the office.    Pathology pending    Complications: None    Condition at Discharge: Good    Discharge instructions/Information to patient and family:   See after visit summary for information provided to patient and family.      Provisions for Follow-Up Care:  See after visit summary for information related to follow-up care and any pertinent home health orders.      Disposition: Home    Planned Readmission: No    Discharge Statement   I spent 30 minutes discharging the patient. This time was spent on the day of discharge. I had direct contact with the patient on the day of discharge. Additional documentation is required if more than 30 minutes were spent on discharge.     Discharge Medications:  See after visit summary for reconciled discharge medications provided to patient and family.

## 2024-06-27 NOTE — UTILIZATION REVIEW
NOTIFICATION OF ADMISSION DISCHARGE   This is a Notification of Discharge from [facility]. Please be advised that this patient has been discharge from our facility. Below you will find the admission and discharge date and time including the patient’s disposition.   UTILIZATION REVIEW CONTACT:  Ernesto Mcintosh  Utilization   Network Utilization Review Department  Phone: [phone] x carefully listen to the prompts. All voicemails are confidential.  Email: [email or web address]     ADMISSION INFORMATION  PRESENTATION DATE: 6/24/2024  5:39 AM  OBERVATION ADMISSION DATE:   INPATIENT ADMISSION DATE: 6/24/24 10:44 AM   DISCHARGE DATE: 6/26/2024  1:51 PM   DISPOSITION:Home/Self Care    Network Utilization Review Department  ATTENTION: Please call with any questions or concerns to [phone] and carefully listen to the prompts so that you are directed to the right person. All voicemails are confidential.   For Discharge needs, contact Care Management DC Support Team at [phone] opt. [phone]  Send all requests for admission clinical reviews, approved or denied determinations and any other requests to dedicated fax number below belonging to the campus where the patient is receiving treatment. List of dedicated fax numbers for the Facilities:  FACILITY NAME UR FAX NUMBER   ADMISSION DENIALS (Administrative/Medical Necessity) [phone]   DISCHARGE SUPPORT TEAM ([facility]) [phone]   PARENT CHILD HEALTH (Maternity/NICU/Pediatrics) [phone]   [facility] [phone]   [facility] [phone]   [facility] [phone]   [facility] [phone]   [facility] [phone]   [facility] [phone]   General acute hospital [phone]   [facility] [phone]   [facility]  [facility] [place] [phone]   [facility] [phone]   [facility] [phone]   [facility] [phone]

## 2024-07-03 ENCOUNTER — DOCUMENTATION (OUTPATIENT)
Dept: HEMATOLOGY ONCOLOGY | Facility: CLINIC | Age: 59
End: 2024-07-03

## 2024-07-03 ENCOUNTER — TELEPHONE (OUTPATIENT)
Age: 59
End: 2024-07-03

## 2024-07-03 DIAGNOSIS — C18.9 MALIGNANT NEOPLASM OF COLON, UNSPECIFIED PART OF COLON (HCC): Primary | ICD-10-CM

## 2024-07-03 NOTE — TELEPHONE ENCOUNTER
Patients GI provider:  Dr. Carreon    Number to return call: [phone]    Reason for call: Pt calling back for results. Transferred to [place] in triage for further assistance.    Scheduled procedure/appointment date if applicable: Apt 08/12/2024

## 2024-07-03 NOTE — TELEPHONE ENCOUNTER
Attempted to contact patient to phone numbers listed in chart, however was unable to reach. A v/m was left for pt to contact ofc to review results. Will attempt again at a later time.

## 2024-07-03 NOTE — PROGRESS NOTES
Pt had recent colon resection on 6/24/24. He has been referred to medical oncology by Dr. Carreon for adjuvant therapy. Pt to be scheduled in the next 2-3 wks. NN to make outreach.

## 2024-07-03 NOTE — TELEPHONE ENCOUNTER
Spoke with patient and advised results.  I did not see the referral for oncology in patient's chart.

## 2024-07-03 NOTE — TELEPHONE ENCOUNTER
----- Message from Azam Carreon MD sent at 7/3/2024 12:02 PM EDT -----  My office to please call Brent, I have called twice this past week to two numbers and gotten voicemail and away for remainder of week, do not want to delay his set up, his pathology as expected is colon cancer, however he did have 1/26 lymph nodes involved, this is stage III colon cancer, and he will need oncology referral/discussion as well as follow-up tumor conference.  A. Terminal ileum, appendix,  right colon (right hemicolectomy):  - Adenocarcinoma with mucinous features (4.0cm)  - Lymph-vascular and perineural invasion present  - Metastatic carcinoma involves one (1) of twenty-six (26) lymph nodes (1/26)  - Margins negative for carcinoma   - See staging synoptic (pT3N1a)        Comment:  - MMR IHC was performed on the prior biopsy showing no loss of nuclear staining: low probability of MSI-H.   - Block A5 is sent for Caris testing.

## 2024-07-11 ENCOUNTER — TELEPHONE (OUTPATIENT)
Dept: HEMATOLOGY ONCOLOGY | Facility: CLINIC | Age: 59
End: 2024-07-11

## 2024-07-11 ENCOUNTER — DOCUMENTATION (OUTPATIENT)
Dept: HEMATOLOGY ONCOLOGY | Facility: CLINIC | Age: 59
End: 2024-07-11

## 2024-07-11 NOTE — TELEPHONE ENCOUNTER
I called Brent in response to a referral that was received for patient to establish care with Medical Oncology    Outreach was made to schedule a consultation.    I left a voicemail explaining the reason for my call and advised patient to call [facility] at [phone].  Another attempt will be made to contact patient.

## 2024-07-12 ENCOUNTER — OFFICE VISIT (OUTPATIENT)
Dept: CARDIOLOGY CLINIC | Facility: CLINIC | Age: 59
End: 2024-07-12
Payer: COMMERCIAL

## 2024-07-12 VITALS
WEIGHT: 211 LBS | DIASTOLIC BLOOD PRESSURE: 80 MMHG | OXYGEN SATURATION: 96 % | BODY MASS INDEX: 30.21 KG/M2 | HEIGHT: 70 IN | TEMPERATURE: 97.9 F | HEART RATE: 78 BPM | SYSTOLIC BLOOD PRESSURE: 128 MMHG

## 2024-07-12 DIAGNOSIS — I25.10 CORONARY ARTERY DISEASE INVOLVING NATIVE CORONARY ARTERY OF NATIVE HEART WITHOUT ANGINA PECTORIS: Primary | ICD-10-CM

## 2024-07-12 DIAGNOSIS — Z79.4 TYPE 2 DIABETES MELLITUS WITHOUT COMPLICATION, WITH LONG-TERM CURRENT USE OF INSULIN (HCC): ICD-10-CM

## 2024-07-12 DIAGNOSIS — E78.49 OTHER HYPERLIPIDEMIA: ICD-10-CM

## 2024-07-12 DIAGNOSIS — E11.9 TYPE 2 DIABETES MELLITUS WITHOUT COMPLICATION, WITH LONG-TERM CURRENT USE OF INSULIN (HCC): ICD-10-CM

## 2024-07-12 DIAGNOSIS — I25.2 HISTORY OF ST ELEVATION MYOCARDIAL INFARCTION (STEMI): ICD-10-CM

## 2024-07-12 DIAGNOSIS — C80.1 ADENOCARCINOMA (HCC): ICD-10-CM

## 2024-07-12 DIAGNOSIS — N18.2 CHRONIC KIDNEY DISEASE (CKD) STAGE G2/A2, MILDLY DECREASED GLOMERULAR FILTRATION RATE (GFR) BETWEEN 60-89 ML/MIN/1.73 SQUARE METER AND ALBUMINURIA CREATININE RATIO BETWEEN 30-299 MG/G: ICD-10-CM

## 2024-07-12 DIAGNOSIS — D62 ACUTE BLOOD LOSS ANEMIA: ICD-10-CM

## 2024-07-12 PROCEDURE — 99214 OFFICE O/P EST MOD 30 MIN: CPT | Performed by: NURSE PRACTITIONER

## 2024-07-12 RX ORDER — ASPIRIN 81 MG/1
81 TABLET ORAL DAILY
Qty: 90 TABLET | Refills: 3 | Status: SHIPPED | OUTPATIENT
Start: 2024-07-12

## 2024-07-12 NOTE — PROGRESS NOTES
RECTAL/GI MULTIDISCIPLINARY CASE REVIEW    DATE: 7/11/2024      PRESENTING DOCTOR: Dr. Carreon      DIAGNOSIS: Stage III adenocarcinoma of the colon      Brent Hall was presented at the Rectal/GI Multidisciplinary Conference today.      PHYSICIAN RECOMMENDED PLAN:    -Follow up chest CT without contrast in 3 months to follow up on noted pulmonary nodules from CT CAP on 5/1/2024.  -Recommendation for adjuvant therapy and patient referred to medical oncology.     Team agreed to plan.    The final treatment plan will be left to the discretion of the patient and the treating physician.     DISCLAIMERS:  TO THE TREATING PHYSICIAN:  This conference is a meeting of clinicians from various specialty areas who evaluate and discuss patients for whom a multidisciplinary treatment approach is being considered. Please note that the above opinion was a consensus of the conference attendees and is intended only to assist in quality care of the discussed patient.  The responsibility for follow up on the input given during the conference, along with any final decisions regarding plan of care, is that of the patient and the patient's provider. Accordingly, appointments have only been recommended based on this information and have NOT been scheduled unless otherwise noted.      TO THE PATIENT:  This summary is a brief record of major aspects of your cancer treatment. You may choose to share a copy with any of your doctors or nurses. However, this is not a detailed or comprehensive record of your care.      NCCN guidelines were readily available for review at this discussion

## 2024-07-12 NOTE — PATIENT INSTRUCTIONS
We can wean off the metoprolol, take 1/2 tab once daily for 4 days, then 1/2 tab every other day for 4 doses then stop.  We will monitor for palpitations or lightheadedness or chest pain.  Ideally we should have you on an antiplatelet medication since you do have plaque in your coronary arteries with history of thrombosis. I am ok with aspirin 81 mg daily. I will verify with your surgeon and then we will call you with instructions.  Your echo showed low normal heart function with scar in the inferior wall related to your heart attack.

## 2024-07-12 NOTE — ASSESSMENT & PLAN NOTE
Lipid panel 12/10/2023: C 209. T 306. H 35. L 113.  Goal LDL < 55.  On atorvastatin 80 mg daily.  Has order for updated lipid panel.

## 2024-07-12 NOTE — ASSESSMENT & PLAN NOTE
Secondary to adenocarcinoma, now s/p resection.  Following with [facility] hem/onc  H/H 9.4/31.7 on 6/25 labs.  Repeat CBC in 1 month after restarting aspirin

## 2024-07-12 NOTE — ASSESSMENT & PLAN NOTE
A. Inferior STEMI 12/9/23.  B. Cardiac cath 12/11/2023: s/p thrombolytic therapy with successful clinical reperfusion. Residual MLI in LM, 30% LAD(m), 50% Lcx(m), 40% RCA(m).  C. Echo 12/10/23 with EF 55% and hypokinesis of the posterobasal wall.  - - - - -  No recurrent chest pain.  DAPT discontinued and placed on Plavix alone in April 2024 due to acute blood loss anemia related to colon ca.  He has been off anti-platelet completely since colon resection in late June. I reviewed with him today the rationale for long-term antiplatelet use in the setting of known CAD with history of thrombosis.  I discussed case with surgeon and we will resume aspirin 81 mg daily today. CBC in 1 month for monitoring.  Atorvastatin 80 mg daily for goal LDL < 55.  He adamantly wishes to get off metoprolol.   Repeat echo 4/2024 shows stable low-normal LVEF of 50-55%.  OK to wean off with metoprolol succinate 12.5 mg daily for 4 doses then every other day for 4 doses, then stop. Will monitor closely.  Discussed urgent s/s to report.

## 2024-07-12 NOTE — ASSESSMENT & PLAN NOTE
Lab Results   Component Value Date    EGFR 86 06/26/2024    EGFR 94 06/25/2024    EGFR 66 04/27/2024    CREATININE 0.96 06/26/2024    CREATININE 0.88 06/25/2024    CREATININE 1.19 04/27/2024     Stable on recent labs.  Avoiding nephrotoxic agents.  Reviewed importance of glucose and blood pressure control.  May benefit from ACE/ARB, which I will discuss with him at follow up.

## 2024-07-12 NOTE — PROGRESS NOTES
Cardiology Follow Up    Brent Hall  1965  [id number]  [facility] [place] [address]  [address]  [facility] [address] [phone]    Brent presents for close follow up of CAD.    1. Coronary artery disease involving native coronary artery of native heart without angina pectoris  Assessment & Plan:  A. Inferior STEMI 12/9/23.  B. Cardiac cath 12/11/2023: s/p thrombolytic therapy with successful clinical reperfusion. Residual MLI in LM, 30% LAD(m), 50% Lcx(m), 40% RCA(m).  C. Echo 12/10/23 with EF 55% and hypokinesis of the posterobasal wall.  - - - - -  No recurrent chest pain.  DAPT discontinued and placed on Plavix alone in April 2024 due to acute blood loss anemia related to colon ca.  He has been off anti-platelet completely since colon resection in late June. I reviewed with him today the rationale for long-term antiplatelet use in the setting of known CAD with history of thrombosis.  I discussed case with surgeon and we will resume aspirin 81 mg daily today. CBC in 1 month for monitoring.  Atorvastatin 80 mg daily for goal LDL < 55.  He adamantly wishes to get off metoprolol.   Repeat echo 4/2024 shows stable low-normal LVEF of 50-55%.  OK to wean off with metoprolol succinate 12.5 mg daily for 4 doses then every other day for 4 doses, then stop. Will monitor closely.  Discussed urgent s/s to report.  Orders:  -     aspirin (ECOTRIN LOW STRENGTH) 81 mg EC tablet; Take 1 tablet (81 mg total) by mouth daily  2. History of ST elevation myocardial infarction (STEMI)  Assessment & Plan:  See discussion under CAD  S/P inferior STEMI 12/9/23 with TNK with successful re-perfusion.  3. Other hyperlipidemia  Assessment & Plan:  Lipid panel 12/10/2023: C 209. T 306. H 35. L 113.  Goal LDL < 55.  On atorvastatin 80 mg daily.  Has order for updated lipid panel.  Orders:  -     Lipid Panel with Direct LDL reflex; Future; Expected date: 08/12/2024  4.  "Adenocarcinoma (HCC)  Assessment & Plan:  S/p resection   Following with [facility] hem/onc and surgery  5. Acute blood loss anemia  Assessment & Plan:  Secondary to adenocarcinoma, now s/p resection.  Following with [facility] hem/onc  H/H 9.4/31.7 on 6/25 labs.  Repeat CBC in 1 month after restarting aspirin  Orders:  -     CBC and Platelet; Future; Expected date: 08/12/2024  6. Chronic kidney disease (CKD) stage G2/A2, mildly decreased glomerular filtration rate (GFR) between 60-89 mL/min/1.73 square meter and albuminuria creatinine ratio between  mg/g  Assessment & Plan:  Lab Results   Component Value Date    EGFR 86 06/26/2024    EGFR 94 06/25/2024    EGFR 66 04/27/2024    CREATININE 0.96 06/26/2024    CREATININE 0.88 06/25/2024    CREATININE 1.19 04/27/2024     Stable on recent labs.  Avoiding nephrotoxic agents.  Reviewed importance of glucose and blood pressure control.  May benefit from ACE/ARB, which I will discuss with him at follow up.  7. Type 2 diabetes mellitus without complication, with long-term current use of insulin (HCC)  Assessment & Plan:    Lab Results   Component Value Date    HGBA1C 6.0 (H) 06/24/2024     Following with [facility] endocrinology.  Significant improvement in Ha1c  Reviewed importance of glucose control for cardiovascular protection.  Goal LDL < 55       HPI  Brent has a past medical history of CAD, inferior STEMI 12/9/2023 treated with TNK, HTN, HLD, T2DM.     Brent presented to [facility] ER 12/9/23 with ongoing mid sternal chest discomfort which he describes as \"indigestion\" lasting for hours and intermittent left arm discomfort. He had experienced similar symptoms 3 times prior in the past week while on a hunting trip but the symptoms resolved within 15 minutes. He went to the hospital where ECG showed inferior STEMI and HS trop was 1300. He was flown to [facility], however, transport was delayed and he received TNK which completely resolved his symptoms. Cardiac catheterization " "revealed mild, non-obstructive triple vessel disease. Echocardiogram showed EF 55% with hypokinesis of the posterobasal segment, no significant valvular abnormality. He was started on DAPT with ASA and Brilinta, atorvastatin 80 mg daily, and metoprolol succinate 25 mg daily. He was found to be diabetic as well with Ha1c of 14. He was started on insulin and given a referral to endocrinology. Prior to this admission he had not been to primary care in over a year. He was unaware of any medical issues leading up to this point. He does have a family history of diabetes. Father has valvular heart disease.     He followed up with me on 4/10/2024 at which time he felt \"terrible\" for months. He reported fatigue, shortness of breath, erectile dysfunction. In the past week he reported developing a frequent dry cough which is worse with exertion or lying down. No chest pain or overt lightheadedness. No syncope/near syncope. No palpitations. He had gained 17 pounds which he feels is due to being sedentary due to activity intolerance. He does admit to onset of leg swelling. He has been taking his medications faithfully and feels he is having side effects. He skipped a dose of metoprolol this morning to see if it would help him feel better. He reports having daily GI upset. He denies ruth bleeding or tarry stools. ECG shows ST at 101 bpm. I ordered blood work and chest xray. Chest xray was clear but labs showed anemia with drop in hemoglobin to 7.1. I changed him from aspirin and Brilinta to Plavix 75 mg daily and added BID Protonix. I placed an urgent referral to GI and hematology who saw him on 4/12/24. GI performed an EGD and colonoscopy on 4/16/24 which revealed a colon mass. He has been referred to colorectal surgery. Hematology is coordinating iron infusions.    7/12/2024: Brent presents for follow up. He was admitted to [facility] 6/24-6/26/2024 for robotic right hemicolectomy and resection of his adenocarcinoma. His " "post-operative course was unremarkable. He was discharged home on Lovenox injections and instructed to stay off Plavix utnil 6/29/2024. He reports today that he is feeling \"great\". His energy levels are returning. Shortness of breath and edema are completely resolved. His incisions are healing. He denies any bleeding issues. He opted not to resume Plavix as he is worried about bleeding. He also asks to come off metoprolol. He questions again today the rationale for these medications. He states he is taking his statin. His sugars are greatly improved. He denies any chest discomfort, lightheadedness, palpitations.     Medical Problems       Problem List       Transaminitis    Other hyperlipidemia    History of ST elevation myocardial infarction (STEMI)    Coronary artery disease involving native coronary artery of native heart without angina pectoris    Type 2 diabetes mellitus without complication, with long-term current use of insulin (HCC)    Gastroesophageal reflux disease without esophagitis    Obesity    Melena    Acute blood loss anemia    Colonic mass    Localized edema    Iron deficiency anemia due to chronic blood loss    Adenocarcinoma (HCC)    Chronic kidney disease (CKD) stage G2/A2, mildly decreased glomerular filtration rate (GFR) between 60-89 mL/min/1.73 square meter and albuminuria creatinine ratio between  mg/g        Past Medical History:   Diagnosis Date    Anemia     CAD (coronary artery disease)     Diabetes mellitus (HCC)     GERD (gastroesophageal reflux disease)     Hyperlipidemia     Hypertension     Pt denies    Obesity (BMI 30-39.9)      Social History     Socioeconomic History    Marital status: /Civil Union     Spouse name: Not on file    Number of children: Not on file    Years of education: Not on file    Highest education level: Not on file   Occupational History    Not on file   Tobacco Use    Smoking status: Former     Current packs/day: 0.00     Types: Cigarettes     " Quit date: 2010     Years since quittin.5    Smokeless tobacco: Never   Vaping Use    Vaping status: Never Used   Substance and Sexual Activity    Alcohol use: Not Currently     Comment: very rare    Drug use: Never    Sexual activity: Not on file     Comment: defer   Other Topics Concern    Not on file   Social History Narrative    Not on file     Social Determinants of Health     Financial Resource Strain: Low Risk  (2024)    [facility]    Overall Financial Resource Strain (CARDIA)     Difficulty of Paying Living Expenses: Not very hard   Food Insecurity: No Food Insecurity (2024)    Hunger Vital Sign     Worried About Running Out of Food in the Last Year: Never true     Ran Out of Food in the Last Year: Never true   Transportation Needs: No Transportation Needs (2024)    PRAPARE - Transportation     Lack of Transportation (Medical): No     Lack of Transportation (Non-Medical): No   Physical Activity: Not on file   Stress: No Stress Concern Present (2024)    [facility]    [employer] - Occupational Stress Questionnaire     Feeling of Stress : Not at all   Social Connections: Unknown (2024)    Received from [facility]     How often do you feel lonely or isolated from those around you? (Adult - for ages 18 years and over): Not on file   Intimate Partner Violence: Not At Risk (2024)    [facility]    Humiliation, Afraid, Rape, and Kick questionnaire     Fear of Current or Ex-Partner: No     Emotionally Abused: No     Physically Abused: No     Sexually Abused: No   Housing Stability: Low Risk  (2024)    Housing Stability Vital Sign     Unable to Pay for Housing in the Last Year: No     Number of Times Moved in the Last Year: 0     Homeless in  the Last Year: No      Family History   Problem Relation Age of Onset    Diabetes Mother     Diabetes Father     Valvular heart disease Father     Atrial fibrillation Father     Diabetes Sister     Lung disease Sister     Heart failure Sister      Past Surgical History:   Procedure Laterality Date    CARDIAC CATHETERIZATION Left 12/11/2023    Procedure: Cardiac Left Heart Cath;  Surgeon: Nacho Salinas DO;  Location: [facility];  Service: Cardiology    CARDIAC CATHETERIZATION N/A 12/11/2023    Procedure: Cardiac Coronary Angiogram;  Surgeon: Nacho Salinas DO;  Location: [facility];  Service: Cardiology    CARDIAC CATHETERIZATION  12/11/2023    Procedure: Cardiac catheterization;  Surgeon: Nacho Salinas DO;  Location: [facility];  Service: Cardiology    NJ LAPAROSCOPY COLECTOMY PARTIAL W/ANASTOMOSIS N/A 6/24/2024    Procedure: RIGHT ROBOTIC HEMICOLECTOMY LAPAROSCOPIC W/ ROBOTICS;  Surgeon: Azam Carreon MD;  Location: [facility];  Service: Colorectal    ULNAR NERVE REPAIR Left 2018    WRIST ARTHRODESIS Bilateral     2019, 2014       Current Outpatient Medications:     acetaminophen (TYLENOL) 325 mg tablet, Take 3 tablets (975 mg total) by mouth every 8 (eight) hours, Disp: , Rfl:     Alcohol Swabs 70 % PADS, May substitute brand based on insurance coverage. Check glucose ACHS., Disp: 200 each, Rfl: 0    aspirin (ECOTRIN LOW STRENGTH) 81 mg EC tablet, Take 1 tablet (81 mg total) by mouth daily, Disp: 90 tablet, Rfl: 3    atorvastatin (LIPITOR) 80 mg tablet, Take 1 tablet (80 mg total) by mouth daily with dinner, Disp: 90 tablet, Rfl: 3    Blood Glucose Monitoring Suppl (OneTouch Verio Reflect) w/Device KIT, May substitute brand based on insurance coverage. Check glucose ACHS., Disp: 1 kit, Rfl: 0    esomeprazole (NexIUM) 20 mg capsule, Take 20 mg by mouth every morning before breakfast, Disp: , Rfl:     Fiasp FlexTouch 100 units/mL injection pen, E11.65 Inject 3  units with breakfast, 5 units with lunch and dinner or as directed, TDD up to 30 units daily, Disp: 27 mL, Rfl: 1    glucose blood (OneTouch Verio) test strip, Check blood sugars 4 times daily, Disp: 360 each, Rfl: 2    Insulin Pen Needle (BD Pen Needle Andreia 2nd Gen) 32G X 4 MM MISC, 4 times daily insulin, Disp: 400 each, Rfl: 1    Lantus SoloStar 100 units/mL SOPN, Inject 0.27 mL (27 Units total) under the skin daily at bedtime TDD up to 30 units daily (Patient taking differently: Inject 20 Units under the skin daily at bedtime TDD up to 30 units daily), Disp: 27 mL, Rfl: 1    nitroglycerin (NITROSTAT) 0.4 mg SL tablet, Place 1 tablet (0.4 mg total) under the tongue every 5 (five) minutes as needed for chest pain, Disp: 30 tablet, Rfl: 0    OneTouch Delica Lancets 33G MISC, May substitute brand based on insurance coverage. Check glucose ACHS., Disp: 200 each, Rfl: 0    Insulin Pen Needle (BD Pen Needle Andreia 2nd Gen) 32G X 4 MM MISC, For use with insulin pen. Pharmacy may dispense brand covered by insurance., Disp: 100 each, Rfl: 0  No Known Allergies    Labs:     Chemistry        Component Value Date/Time    K 4.0 06/26/2024 0618    K 4.8 07/30/2020 1231     06/26/2024 0618     07/30/2020 1231    CO2 26 06/26/2024 0618    CO2 27 07/30/2020 1231    BUN 15 06/26/2024 0618    BUN 18 07/30/2020 1231    CREATININE 0.96 06/26/2024 0618    CREATININE 1.12 07/30/2020 1231        Component Value Date/Time    CALCIUM 8.2 (L) 06/26/2024 0618    CALCIUM 9.2 07/30/2020 1231    ALKPHOS 69 04/27/2024 1126    ALKPHOS 72 07/30/2020 1231    AST 15 04/27/2024 1126    AST 21 07/30/2020 1231    ALT 15 04/27/2024 1126    ALT 43 07/30/2020 1231        Lipid panel 12/10/2023: C 209. T 306. H 35. L 113.     Imaging:   Echo 4/25/2024  This is a limited study for LV function assessment.  Left ventricle is normal in size with low normal systolic function.  Basal inferior wall is hypokinetic.  Mild to moderate mitral  "regurgitation.  No significant difference compared to December 2023 study report    ECG 4/10/2024: Sinus tachycardia. LAD. Inferior infarct. Rate 101 bpm.     ECG 12/21/2023: Normal sinus rhythm. LAD. Inferior infarct. Anterolateral infarct. Rate 68 bpm.     Cardiac catheterization 12/11/2023  s/p thrombolytic therapy for inferior stemi with successful clinical reperfusion. Cath today with no significant obstructive disease requiring PCI. Minimal LM. 30% mid LAD. 50% mid Lcx. 40% mid RCA.     Echo complete 12/10/2023  EF 55%. Hypokinesis of the posterobasal segment of the LV. Mild MR. Trace TR.     ECG 12/9/2023: Normal sinus rhythm with sinus arrhthmia. Inferior-posterior infact with acute inferior infarct.    Review of Systems   Constitutional: Negative.   HENT: Negative.     Cardiovascular:  Negative for chest pain, dyspnea on exertion, irregular heartbeat, leg swelling, near-syncope, orthopnea and palpitations.   Respiratory:  Negative for cough and snoring.    Endocrine: Negative.    Skin: Negative.    Musculoskeletal: Negative.    Gastrointestinal: Negative.    Genitourinary: Negative.    Neurological: Negative.    Psychiatric/Behavioral: Negative.         Vitals:    07/12/24 1532   BP: 128/80   Pulse:    Temp:    SpO2:      Vitals:    07/12/24 1505   Weight: 95.7 kg (211 lb)     Height: 5' 10\" (177.8 cm)   Body mass index is 30.28 kg/m².    Physical Exam  Vitals and nursing note reviewed.   Constitutional:       General: He is not in acute distress.     Appearance: He is well-developed. He is obese. He is not diaphoretic.   HENT:      Head: Normocephalic and atraumatic.   Neck:      Vascular: No carotid bruit or JVD.   Cardiovascular:      Rate and Rhythm: Normal rate and regular rhythm. No extrasystoles are present.     Pulses: Intact distal pulses.      Heart sounds: Normal heart sounds, S1 normal and S2 normal. No murmur heard.     No friction rub. No gallop.      Comments: No edema  Pulmonary:      " Effort: Pulmonary effort is normal. No respiratory distress.      Breath sounds: Normal breath sounds.   Abdominal:      General: There is no distension.      Palpations: Abdomen is soft.      Tenderness: There is no abdominal tenderness.   Skin:     General: Skin is warm and dry.      Findings: No rash.      Comments: Surgical incisions healing well with no signs of infection   Neurological:      Mental Status: He is alert and oriented to person, place, and time.   Psychiatric:         Mood and Affect: Mood and affect normal.         Behavior: Behavior normal.

## 2024-07-12 NOTE — ASSESSMENT & PLAN NOTE
Lab Results   Component Value Date    HGBA1C 6.0 (H) 06/24/2024     Following with [facility]'s endocrinology.  Significant improvement in Ha1c  Reviewed importance of glucose control for cardiovascular protection.  Goal LDL < 55

## 2024-07-22 ENCOUNTER — PATIENT OUTREACH (OUTPATIENT)
Dept: HEMATOLOGY ONCOLOGY | Facility: CLINIC | Age: 59
End: 2024-07-22

## 2024-07-22 NOTE — PROGRESS NOTES
NN phone outreach to the pt, he stated he had a minute to speak, I asked if another time would work better for him, he said now as ok to speak. We discussed his surgery after his dx of colon cancer and his upcoming appt w M/O. The pt is sched on 8/2/24 w Dr. Almaraz and is aware of the appt details. He does have transportation for this appt and will have his wife w him at the time of his consult. From his understanding he was going to get tested to see if there was any cancer remaining following his surgery before discussing chemotherapy and he at this time has no cancer cause it was removed by surgery. I explained yes technically he is disease free and they may check for CTD but I do not recall this always being done for colon pts. I explained this will be all discussed at the consult what  feels would be the next best steps to take and may or may not recommend adjvuant chemo. I touched on the possible need for a port but based on factors mentioned above. He wants to hold off on scheduling this, I explained that there are 2 wks from his appt plus the time it will be for first avail port date following his consult. He asked if this was urgent that it had to be sched and I told him no, not in this case. He would like to wait til meeting the provider to set up if needed.     Pt is healing well from surgery and has no pain or other complaints at this time. He took my contact info and understands that Hortencia Caceres will take my place as his main point of contact once and if there is an est tx plan for him. He thanked me for my call today.

## 2024-08-02 ENCOUNTER — CONSULT (OUTPATIENT)
Dept: HEMATOLOGY ONCOLOGY | Facility: CLINIC | Age: 59
End: 2024-08-02
Payer: COMMERCIAL

## 2024-08-02 VITALS
SYSTOLIC BLOOD PRESSURE: 146 MMHG | DIASTOLIC BLOOD PRESSURE: 78 MMHG | HEIGHT: 70 IN | OXYGEN SATURATION: 95 % | WEIGHT: 212 LBS | HEART RATE: 86 BPM | BODY MASS INDEX: 30.35 KG/M2 | TEMPERATURE: 98.6 F

## 2024-08-02 DIAGNOSIS — D50.0 IRON DEFICIENCY ANEMIA DUE TO CHRONIC BLOOD LOSS: ICD-10-CM

## 2024-08-02 DIAGNOSIS — C18.2 MALIGNANT NEOPLASM OF ASCENDING COLON (HCC): Primary | ICD-10-CM

## 2024-08-02 PROCEDURE — 99215 OFFICE O/P EST HI 40 MIN: CPT | Performed by: INTERNAL MEDICINE

## 2024-08-02 NOTE — LETTER
August 2, 2024     Oscar Call MD  [address] [facility] [address]    Patient: Brent Hall   YOB: 1965   Date of Visit: 8/2/2024       Dear Dr. Call:    Thank you for referring Brent Hall to me for evaluation. Below are my notes for this consultation.    If you have questions, please do not hesitate to call me. I look forward to following your patient along with you.         Sincerely,        Shadi Almaraz, DO        CC: MD Shadi Keyes, DO  8/2/2024  3:04 PM  Sign when Signing Visit  [facility]AT[facility] [place] [address] [facility] [address]  [phone]  [phone]    Brent Hall,1965, [id number]  08/02/24    Discussion:   In summary, this is a 58-year-old male with a history of recently resected stage III adenocarcinoma of the ascending colon.  We reviewed that FOLFOX for XELOX would be reasonable to consider.  Goal of chemotherapy is to decrease likelihood of disease recurrence.  If disease recurs, could be treated although chance of cure is substantially lower than it would be at this time.  We also reviewed that even if chemotherapy is administered cure is not guaranteed.  We reviewed potential toxicities of these programs including but not limited to nausea, vomiting, cytopenias, hair thinning, fatigue, diarrhea, neuropathy, reversible and irreversible.  We reviewed differences in schedule and routes of administration.  We reviewed prophylactic measures taken routinely as well as monitoring to allow for early intervention as appropriate.  We reviewed that pulmonary nodules are of unclear etiology.  Although likelihood that they are related to his colon cancer is low this cannot be entirely excluded.  Too small to biopsy at this time.  Will likely become clear with repeat evaluation.  Previous iron deficiency anemia improving,Hemoglobin 11.2, MCV 77, both on June 20, 2024.  I asked the patient to consider the above  and let me know and a week or less how he wishes to proceed.    I discussed the above with the patient.  The patient  voiced understanding and agreement.  ______________________________________________________________________    No chief complaint on file.      HPI:  Oncology History   Malignant neoplasm of ascending colon (HCC)   4/16/2024 Initial Diagnosis    April 16, 2024 EGD showed subcentimeter polyps in the stomach, antral mucosal erythema.  Colonoscopy showed ulcerated mass in the ascending colon.  Biopsy of the mass showed adenocarcinoma.  May 1, 2024 CT chest ab pelvis showed ascending colon mass.  3-5 mm bilateral pulmonary nodules.  No other suspicious lesions.     6/24/2024 Initial Diagnosis    June 24, 2024 patient underwent right hemicolectomy.  Pathology indicates adenocarcinoma, T3, N1, stage III.     6/24/2024 -  Cancer Staged    Staging form: Colon and Rectum, AJCC 8th Edition  - Pathologic stage from 6/24/2024: Stage IIIB (pT3, pN1, cM0) - Signed by Shadi Almaraz DO on 8/2/2024           Interval History: Clinically stable.  ECOG-  0 - Asymptomatic    Review of Systems   Constitutional:  Negative for chills and fever.   HENT:  Negative for nosebleeds.    Eyes:  Negative for discharge.   Respiratory:  Negative for cough and shortness of breath.    Cardiovascular:  Negative for chest pain.   Gastrointestinal:  Negative for abdominal pain, constipation and diarrhea.   Endocrine: Negative for polydipsia.   Genitourinary:  Negative for hematuria.   Musculoskeletal:  Negative for arthralgias.   Skin:  Negative for color change.   Allergic/Immunologic: Negative for immunocompromised state.   Neurological:  Negative for dizziness and headaches.   Hematological:  Negative for adenopathy.   Psychiatric/Behavioral:  Negative for agitation.        Past Medical History:   Diagnosis Date   • Anemia    • CAD (coronary artery disease)    • Diabetes mellitus (HCC)    • GERD (gastroesophageal reflux disease)     • Hyperlipidemia    • Hypertension     Pt denies   • Obesity (BMI 30-39.9)      Patient Active Problem List   Diagnosis   • History of ST elevation myocardial infarction (STEMI)   • Obesity   • Type 2 diabetes mellitus without complication, with long-term current use of insulin (HCC)   • Transaminitis   • Coronary artery disease involving native coronary artery of native heart without angina pectoris   • Gastroesophageal reflux disease without esophagitis   • Other hyperlipidemia   • Melena   • Acute blood loss anemia   • Colonic mass   • Localized edema   • Iron deficiency anemia due to chronic blood loss   • Malignant neoplasm of ascending colon (HCC)   • Chronic kidney disease (CKD) stage G2/A2, mildly decreased glomerular filtration rate (GFR) between 60-89 mL/min/1.73 square meter and albuminuria creatinine ratio between  mg/g       Current Outpatient Medications:   •  acetaminophen (TYLENOL) 325 mg tablet, Take 3 tablets (975 mg total) by mouth every 8 (eight) hours, Disp: , Rfl:   •  Alcohol Swabs 70 % PADS, May substitute brand based on insurance coverage. Check glucose ACHS., Disp: 200 each, Rfl: 0  •  aspirin (ECOTRIN LOW STRENGTH) 81 mg EC tablet, Take 1 tablet (81 mg total) by mouth daily, Disp: 90 tablet, Rfl: 3  •  atorvastatin (LIPITOR) 80 mg tablet, Take 1 tablet (80 mg total) by mouth daily with dinner, Disp: 90 tablet, Rfl: 3  •  Blood Glucose Monitoring Suppl (OneTouch Verio Reflect) w/Device KIT, May substitute brand based on insurance coverage. Check glucose ACHS., Disp: 1 kit, Rfl: 0  •  esomeprazole (NexIUM) 20 mg capsule, Take 20 mg by mouth every morning before breakfast, Disp: , Rfl:   •  Fiasp FlexTouch 100 units/mL injection pen, E11.65 Inject 3 units with breakfast, 5 units with lunch and dinner or as directed, TDD up to 30 units daily, Disp: 27 mL, Rfl: 1  •  glucose blood (OneTouch Verio) test strip, Check blood sugars 4 times daily, Disp: 360 each, Rfl: 2  •  Insulin Pen  Needle (BD Pen Needle Andreia 2nd Gen) 32G X 4 MM MISC, 4 times daily insulin, Disp: 400 each, Rfl: 1  •  Insulin Pen Needle (BD Pen Needle Andreia 2nd Gen) 32G X 4 MM MISC, For use with insulin pen. Pharmacy may dispense brand covered by insurance., Disp: 100 each, Rfl: 0  •  Lantus SoloStar 100 units/mL SOPN, Inject 0.27 mL (27 Units total) under the skin daily at bedtime TDD up to 30 units daily (Patient taking differently: Inject 20 Units under the skin daily at bedtime TDD up to 30 units daily), Disp: 27 mL, Rfl: 1  •  nitroglycerin (NITROSTAT) 0.4 mg SL tablet, Place 1 tablet (0.4 mg total) under the tongue every 5 (five) minutes as needed for chest pain, Disp: 30 tablet, Rfl: 0  •  OneTouch Delica Lancets 33G MISC, May substitute brand based on insurance coverage. Check glucose ACHS., Disp: 200 each, Rfl: 0  No Known Allergies  Past Surgical History:   Procedure Laterality Date   • CARDIAC CATHETERIZATION Left 12/11/2023    Procedure: Cardiac Left Heart Cath;  Surgeon: Nacho Salinas DO;  [facility];  Service: Cardiology   • CARDIAC CATHETERIZATION N/A 12/11/2023    Procedure: Cardiac Coronary Angiogram;  Surgeon: Nacho Salinas DO;  [facility];  Service: Cardiology   • CARDIAC CATHETERIZATION  12/11/2023    Procedure: Cardiac catheterization;  Surgeon: Nacho Salinas DO;  [facility];  Service: Cardiology   • MO LAPAROSCOPY COLECTOMY PARTIAL W/ANASTOMOSIS N/A 6/24/2024    Procedure: RIGHT ROBOTIC HEMICOLECTOMY LAPAROSCOPIC W/ ROBOTICS;  Surgeon: Azam Carreon MD;  Location: [facility];  Service: Colorectal   • ULNAR NERVE REPAIR Left 2018   • WRIST ARTHRODESIS Bilateral     2019, 2014     Social History    Objective:  Vitals:    08/02/24 1350   BP: 146/78   BP Location: Left arm   Patient Position: Sitting   Cuff Size: Standard   Pulse: 86   Temp: 98.6 °F (37 °C)   TempSrc: Temporal   SpO2: 95%   Weight: 96.2 kg (212 lb)   Height: 5'  "10\" (1.778 m)     Physical Exam  Constitutional:       Appearance: He is well-developed.   HENT:      Head: Normocephalic and atraumatic.      Mouth/Throat:      Mouth: Mucous membranes are moist.   Eyes:      Pupils: Pupils are equal, round, and reactive to light.   Cardiovascular:      Rate and Rhythm: Normal rate and regular rhythm.      Heart sounds: No murmur heard.  Pulmonary:      Breath sounds: Normal breath sounds. No wheezing or rales.   Abdominal:      Palpations: Abdomen is soft.      Tenderness: There is no abdominal tenderness.   Musculoskeletal:         General: No tenderness. Normal range of motion.      Cervical back: Neck supple.   Lymphadenopathy:      Cervical: No cervical adenopathy.   Skin:     Findings: No erythema or rash.   Neurological:      Mental Status: He is alert and oriented to person, place, and time.      Cranial Nerves: No cranial nerve deficit.      Deep Tendon Reflexes: Reflexes are normal and symmetric.   Psychiatric:         Behavior: Behavior normal.           Labs:  I personally reviewed the labs and imaging pertinent to this patient care.  "

## 2024-08-02 NOTE — PROGRESS NOTES
[facility] HEMATOLOGY ONCOLOGY SPECIALISTS [place]  [address] [facility] [address]  [phone]  [phone]    Brent Hall,1965, [id number]  08/02/24    Discussion:   In summary, this is a 58-year-old male with a history of recently resected stage III adenocarcinoma of the ascending colon.  We reviewed that FOLFOX for XELOX would be reasonable to consider.  Goal of chemotherapy is to decrease likelihood of disease recurrence.  If disease recurs, could be treated although chance of cure is substantially lower than it would be at this time.  We also reviewed that even if chemotherapy is administered cure is not guaranteed.  We reviewed potential toxicities of these programs including but not limited to nausea, vomiting, cytopenias, hair thinning, fatigue, diarrhea, neuropathy, reversible and irreversible.  We reviewed differences in schedule and routes of administration.  We reviewed prophylactic measures taken routinely as well as monitoring to allow for early intervention as appropriate.  We reviewed that pulmonary nodules are of unclear etiology.  Although likelihood that they are related to his colon cancer is low this cannot be entirely excluded.  Too small to biopsy at this time.  Will likely become clear with repeat evaluation.  Previous iron deficiency anemia improving,Hemoglobin 11.2, MCV 77, both on June 20, 2024.  I asked the patient to consider the above and let me know and a week or less how he wishes to proceed.    I discussed the above with the patient.  The patient  voiced understanding and agreement.  ______________________________________________________________________    No chief complaint on file.      HPI:  Oncology History   Malignant neoplasm of ascending colon (HCC)   4/16/2024 Initial Diagnosis    April 16, 2024 EGD showed subcentimeter polyps in the stomach, antral mucosal erythema.  Colonoscopy showed ulcerated mass in the ascending colon.  Biopsy of the mass showed  adenocarcinoma.  May 1, 2024 CT chest ab pelvis showed ascending colon mass.  3-5 mm bilateral pulmonary nodules.  No other suspicious lesions.     6/24/2024 Initial Diagnosis    June 24, 2024 patient underwent right hemicolectomy.  Pathology indicates adenocarcinoma, T3, N1, stage III.     6/24/2024 -  Cancer Staged    Staging form: Colon and Rectum, AJCC 8th Edition  - Pathologic stage from 6/24/2024: Stage IIIB (pT3, pN1, cM0) - Signed by Shadi Almaraz DO on 8/2/2024           Interval History: Clinically stable.  ECOG-  0 - Asymptomatic    Review of Systems   Constitutional:  Negative for chills and fever.   HENT:  Negative for nosebleeds.    Eyes:  Negative for discharge.   Respiratory:  Negative for cough and shortness of breath.    Cardiovascular:  Negative for chest pain.   Gastrointestinal:  Negative for abdominal pain, constipation and diarrhea.   Endocrine: Negative for polydipsia.   Genitourinary:  Negative for hematuria.   Musculoskeletal:  Negative for arthralgias.   Skin:  Negative for color change.   Allergic/Immunologic: Negative for immunocompromised state.   Neurological:  Negative for dizziness and headaches.   Hematological:  Negative for adenopathy.   Psychiatric/Behavioral:  Negative for agitation.        Past Medical History:   Diagnosis Date    Anemia     CAD (coronary artery disease)     Diabetes mellitus (HCC)     GERD (gastroesophageal reflux disease)     Hyperlipidemia     Hypertension     Pt denies    Obesity (BMI 30-39.9)      Patient Active Problem List   Diagnosis    History of ST elevation myocardial infarction (STEMI)    Obesity    Type 2 diabetes mellitus without complication, with long-term current use of insulin (HCC)    Transaminitis    Coronary artery disease involving native coronary artery of native heart without angina pectoris    Gastroesophageal reflux disease without esophagitis    Other hyperlipidemia    Melena    Acute blood loss anemia    Colonic mass     Localized edema    Iron deficiency anemia due to chronic blood loss    Malignant neoplasm of ascending colon (HCC)    Chronic kidney disease (CKD) stage G2/A2, mildly decreased glomerular filtration rate (GFR) between 60-89 mL/min/1.73 square meter and albuminuria creatinine ratio between  mg/g       Current Outpatient Medications:     acetaminophen (TYLENOL) 325 mg tablet, Take 3 tablets (975 mg total) by mouth every 8 (eight) hours, Disp: , Rfl:     Alcohol Swabs 70 % PADS, May substitute brand based on insurance coverage. Check glucose ACHS., Disp: 200 each, Rfl: 0    aspirin (ECOTRIN LOW STRENGTH) 81 mg EC tablet, Take 1 tablet (81 mg total) by mouth daily, Disp: 90 tablet, Rfl: 3    atorvastatin (LIPITOR) 80 mg tablet, Take 1 tablet (80 mg total) by mouth daily with dinner, Disp: 90 tablet, Rfl: 3    Blood Glucose Monitoring Suppl (OneTouch Verio Reflect) w/Device KIT, May substitute brand based on insurance coverage. Check glucose ACHS., Disp: 1 kit, Rfl: 0    esomeprazole (NexIUM) 20 mg capsule, Take 20 mg by mouth every morning before breakfast, Disp: , Rfl:     Fiasp FlexTouch 100 units/mL injection pen, E11.65 Inject 3 units with breakfast, 5 units with lunch and dinner or as directed, TDD up to 30 units daily, Disp: 27 mL, Rfl: 1    glucose blood (OneTouch Verio) test strip, Check blood sugars 4 times daily, Disp: 360 each, Rfl: 2    Insulin Pen Needle (BD Pen Needle Andreia 2nd Gen) 32G X 4 MM MISC, 4 times daily insulin, Disp: 400 each, Rfl: 1    Insulin Pen Needle (BD Pen Needle Andreia 2nd Gen) 32G X 4 MM MISC, For use with insulin pen. Pharmacy may dispense brand covered by insurance., Disp: 100 each, Rfl: 0    Lantus SoloStar 100 units/mL SOPN, Inject 0.27 mL (27 Units total) under the skin daily at bedtime TDD up to 30 units daily (Patient taking differently: Inject 20 Units under the skin daily at bedtime TDD up to 30 units daily), Disp: 27 mL, Rfl: 1    nitroglycerin (NITROSTAT) 0.4 mg SL tablet,  "Place 1 tablet (0.4 mg total) under the tongue every 5 (five) minutes as needed for chest pain, Disp: 30 tablet, Rfl: 0    OneTouch Delica Lancets 33G MISC, May substitute brand based on insurance coverage. Check glucose ACHS., Disp: 200 each, Rfl: 0  No Known Allergies  Past Surgical History:   Procedure Laterality Date    CARDIAC CATHETERIZATION Left 12/11/2023    Procedure: Cardiac Left Heart Cath;  Surgeon: Nacho Salinas DO;  [facility]: [facility];  Service: Cardiology    CARDIAC CATHETERIZATION N/A 12/11/2023    Procedure: Cardiac Coronary Angiogram;  Surgeon: Nacho Salinas DO;  [facility]: [facility];  Service: Cardiology    CARDIAC CATHETERIZATION  12/11/2023    Procedure: Cardiac catheterization;  Surgeon: Nacho Salinas DO;  [facility];  Service: Cardiology    IA LAPAROSCOPY COLECTOMY PARTIAL W/ANASTOMOSIS N/A 6/24/2024    Procedure: RIGHT ROBOTIC HEMICOLECTOMY LAPAROSCOPIC W/ ROBOTICS;  Surgeon: Azam Carreon MD;  Location: [facility];  Service: Colorectal    ULNAR NERVE REPAIR Left 2018    WRIST ARTHRODESIS Bilateral     2019, 2014     Social History     Objective:  Vitals:    08/02/24 1350   BP: 146/78   BP Location: Left arm   Patient Position: Sitting   Cuff Size: Standard   Pulse: 86   Temp: 98.6 °F (37 °C)   TempSrc: Temporal   SpO2: 95%   Weight: 96.2 kg (212 lb)   Height: 5' 10\" (1.778 m)     Physical Exam  Constitutional:       Appearance: He is well-developed.   HENT:      Head: Normocephalic and atraumatic.      Mouth/Throat:      Mouth: Mucous membranes are moist.   Eyes:      Pupils: Pupils are equal, round, and reactive to light.   Cardiovascular:      Rate and Rhythm: Normal rate and regular rhythm.      Heart sounds: No murmur heard.  Pulmonary:      Breath sounds: Normal breath sounds. No wheezing or rales.   Abdominal:      Palpations: Abdomen is soft.      Tenderness: There is no abdominal tenderness.   Musculoskeletal:        "  General: No tenderness. Normal range of motion.      Cervical back: Neck supple.   Lymphadenopathy:      Cervical: No cervical adenopathy.   Skin:     Findings: No erythema or rash.   Neurological:      Mental Status: He is alert and oriented to person, place, and time.      Cranial Nerves: No cranial nerve deficit.      Deep Tendon Reflexes: Reflexes are normal and symmetric.   Psychiatric:         Behavior: Behavior normal.           Labs:  I personally reviewed the labs and imaging pertinent to this patient care.

## 2024-08-05 ENCOUNTER — PATIENT OUTREACH (OUTPATIENT)
Dept: HEMATOLOGY ONCOLOGY | Facility: CLINIC | Age: 59
End: 2024-08-05

## 2024-08-05 NOTE — PROGRESS NOTES
I reached out to Brent  to complete the Distress Thermometer, review for any barriers to care and to offer any supportive services that may be needed. I left VM with the reason for my call including my direct phone number .

## 2024-08-08 ENCOUNTER — PATIENT OUTREACH (OUTPATIENT)
Dept: HEMATOLOGY ONCOLOGY | Facility: CLINIC | Age: 59
End: 2024-08-08

## 2024-08-09 NOTE — PROGRESS NOTES
Colon and Rectal Surgery   Brent Hall 58 y.o. male MRN: [id number]   Encounter: [id number]  24   2:49 PM      Ambulatory Visit  Name: Brent Hall      : 1965      MRN: [id number]  Encounter Provider: Azam Carreon MD  Encounter Date: 2024   Encounter department: [facility] COLON AND RECTAL SURGERY [place]    Assessment & Plan   1. Malignant neoplasm of ascending colon (HCC)  Assessment & Plan:  Brent returns today, he is 7 weeks out from robotic assisted right hemicolectomy,24.     A. 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 Almaraz and did not want to pursue this given information that he was given.  He is asking if there is a way to know if the tumor is ongoing in his bloodstream, I discussed with him that the concern is always ongoing especially with node positive disease however he can discuss with second opinion certain newer testing including ct DNA.    Regardless, he needs ongoing surveillance, labs/CEA/CT scan, as well as colonoscopy 1 year from last.    -For now I will see him in 6 months.  -I have discussed with Dr. Leyva today, who will see him in second opinion, preferably by telemedicine given his distance    CC:    MD Dr. Jordan Estevez Dr.          HPI  Brent Hall is a 58 y.o. male who presents for a post operative evaluation.     The patient is status post diagnostic laparoscopy, robotic right hemicolectomy, ileocolic, ileum to mid transverse colon, side-to-side, functional end-to-end stapled anastomosis, intraoperative fluorescence angiography on 24 for  adenocarcinoma of the ascending colon.    Operative Findings:  -Ascending colon tattoo located,ileum to transverse colon, side-to-side functional end-to-end stapled ileocolic anastomosis,good fluorescence Firefly proximal to stapler, ileum and transverse colon    Final Diagnosis:  A. 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 Almaraz on 8/2/24 and adjuvant therapy was discussed. The patient was given time to consider how he would like to proceed with treatment.    He denies abdominal pain and discomfort.   He states that his appetite and diet have returned to normal.     He has daily bowel movements with soft and formed stool. He denies rectal bleeding and blood in the stool.     He states that he does not wish to proceed with adjuvant therapy at this time.     Historical Information   Past Medical History:   Diagnosis Date    Anemia     CAD (coronary artery disease)     Diabetes mellitus (HCC)     GERD (gastroesophageal reflux disease)     Hyperlipidemia     Hypertension     Pt denies    Obesity (BMI 30-39.9)      Past Surgical History:   Procedure Laterality Date    CARDIAC CATHETERIZATION Left 12/11/2023    Procedure: Cardiac Left Heart Cath;  Surgeon: Nacho Salinas DO;  Location: [facility];  Service: Cardiology    CARDIAC CATHETERIZATION N/A 12/11/2023    Procedure: Cardiac Coronary Angiogram;  Surgeon: Nacho Salinas DO;  Location: [facility];  Service: Cardiology    CARDIAC CATHETERIZATION  12/11/2023    Procedure: Cardiac catheterization;  Surgeon: Nacho Salinas DO;  Location: [facility];  Service: Cardiology    MA LAPAROSCOPY COLECTOMY PARTIAL  W/ANASTOMOSIS N/A 6/24/2024    Procedure: RIGHT ROBOTIC HEMICOLECTOMY LAPAROSCOPIC W/ ROBOTICS;  Surgeon: Azam Carreon MD;  Location: [facility];  Service: Colorectal    ULNAR NERVE REPAIR Left 2018    WRIST ARTHRODESIS Bilateral     2019, 2014       Meds/Allergies       Current Outpatient Medications:     Alcohol Swabs 70 % PADS, May substitute brand based on insurance coverage. Check glucose ACHS., Disp: 200 each, Rfl: 0    aspirin (ECOTRIN LOW STRENGTH) 81 mg EC tablet, Take 1 tablet (81 mg total) by mouth daily, Disp: 90 tablet, Rfl: 3    atorvastatin (LIPITOR) 80 mg tablet, Take 1 tablet (80 mg total) by mouth daily with dinner, Disp: 90 tablet, Rfl: 3    Blood Glucose Monitoring Suppl (OneTouch Verio Reflect) w/Device KIT, May substitute brand based on insurance coverage. Check glucose ACHS., Disp: 1 kit, Rfl: 0    esomeprazole (NexIUM) 20 mg capsule, Take 20 mg by mouth every morning before breakfast, Disp: , Rfl:     Fiasp FlexTouch 100 units/mL injection pen, E11.65 Inject 3 units with breakfast, 5 units with lunch and dinner or as directed, TDD up to 30 units daily, Disp: 27 mL, Rfl: 1    glucose blood (OneTouch Verio) test strip, Check blood sugars 4 times daily, Disp: 360 each, Rfl: 2    Insulin Pen Needle (BD Pen Needle Andreia 2nd Gen) 32G X 4 MM MISC, 4 times daily insulin, Disp: 400 each, Rfl: 1    Lantus SoloStar 100 units/mL SOPN, Inject 0.27 mL (27 Units total) under the skin daily at bedtime TDD up to 30 units daily (Patient taking differently: Inject 20 Units under the skin daily at bedtime TDD up to 30 units daily), Disp: 27 mL, Rfl: 1    nitroglycerin (NITROSTAT) 0.4 mg SL tablet, Place 1 tablet (0.4 mg total) under the tongue every 5 (five) minutes as needed for chest pain, Disp: 30 tablet, Rfl: 0    OneTouch Delica Lancets 33G MISC, May substitute brand based on insurance coverage. Check glucose ACHS., Disp: 200 each, Rfl: 0    acetaminophen (TYLENOL) 325 mg tablet, Take 3 tablets (975 mg  "total) by mouth every 8 (eight) hours (Patient not taking: Reported on 2024), Disp: , Rfl:     Insulin Pen Needle (BD Pen Needle Andreia 2nd Gen) 32G X 4 MM MISC, For use with insulin pen. Pharmacy may dispense brand covered by insurance. (Patient not taking: Reported on 2024), Disp: 100 each, Rfl: 0      No Known Allergies      Social History   Social History     Substance and Sexual Activity   Alcohol Use Not Currently    Comment: very rare     Social History     Substance and Sexual Activity   Drug Use Never     Social History     Tobacco Use   Smoking Status Former    Current packs/day: 0.00    Types: Cigarettes    Quit date: 2010    Years since quittin.6   Smokeless Tobacco Never         Family History:   Family History   Problem Relation Age of Onset    Diabetes Mother     Diabetes Father     Valvular heart disease Father     Atrial fibrillation Father     Diabetes Sister     Lung disease Sister     Heart failure Sister        Review of Systems    Objective     Current Vitals:   Vitals:    24 1420   BP: 132/84   Weight: 96.6 kg (213 lb)   Height: 5' 10\" (1.778 m)       Physical Exam:  General:no distress  Neck:supple  Pulm:no increased work of breathing, clear bilateral  CV:sinus  Abdomen:soft,nontender, well healed incisions  Extremities:no edema      "

## 2024-08-12 ENCOUNTER — OFFICE VISIT (OUTPATIENT)
Age: 59
End: 2024-08-12

## 2024-08-12 VITALS
WEIGHT: 213 LBS | SYSTOLIC BLOOD PRESSURE: 132 MMHG | DIASTOLIC BLOOD PRESSURE: 84 MMHG | HEIGHT: 70 IN | BODY MASS INDEX: 30.49 KG/M2

## 2024-08-12 DIAGNOSIS — C18.2 MALIGNANT NEOPLASM OF ASCENDING COLON (HCC): Primary | ICD-10-CM

## 2024-08-12 PROCEDURE — 99024 POSTOP FOLLOW-UP VISIT: CPT | Performed by: COLON & RECTAL SURGERY

## 2024-08-12 NOTE — ASSESSMENT & PLAN NOTE
Brent returns today, he is 7 weeks out from robotic assisted right hemicolectomy,6/24/24.     A. 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 Almaraz and did not want to pursue this given information that he was given.  He is asking if there is a way to know if the tumor is ongoing in his bloodstream, I discussed with him that the concern is always ongoing especially with node positive disease however he can discuss with second opinion certain newer testing including ct DNA.    Regardless, he needs ongoing surveillance, labs/CEA/CT scan, as well as colonoscopy 1 year from last.    -For now I will see him in 6 months.  -I have discussed with Dr. Leyva today, who will see him in second opinion, preferably by telemedicine given his distance    CC:    MD Dr. Jordan Estevez Dr.

## 2024-08-13 ENCOUNTER — TELEPHONE (OUTPATIENT)
Dept: HEMATOLOGY ONCOLOGY | Facility: CLINIC | Age: 59
End: 2024-08-13

## 2024-08-13 NOTE — TELEPHONE ENCOUNTER
Dr. Carreon asked me to see this gentleman.  I called his phone number and left message and requested a return call.  I left my name and office phone #[phone].

## 2024-08-15 ENCOUNTER — TELEPHONE (OUTPATIENT)
Dept: HEMATOLOGY ONCOLOGY | Facility: CLINIC | Age: 59
End: 2024-08-15

## 2024-08-15 NOTE — TELEPHONE ENCOUNTER
Called patient and left voicemail with location, date and time. Provided patient with call back number if date and time does not work.

## 2024-08-15 NOTE — TELEPHONE ENCOUNTER
Pt called to make an appt with Dr. Leyva. There's a note in pt chart from Dr. Leyva to get pt scheduled in for [place] location next wednesday 8/21/24 (no time slot was available). Pt can be reached at [phone] for booking and confirmation.

## 2024-08-15 NOTE — TELEPHONE ENCOUNTER
I called the patient again today and introduced myself that Dr. Carreon has asked me to give you a call to give you an appointment and I left a message on his cell phone that he can call [phone] to make an appointment and I can see him in [place] next week Wednesday.  I left my name and above of his number.

## 2024-08-21 ENCOUNTER — OFFICE VISIT (OUTPATIENT)
Dept: HEMATOLOGY ONCOLOGY | Facility: CLINIC | Age: 59
End: 2024-08-21
Payer: COMMERCIAL

## 2024-08-21 VITALS
HEART RATE: 82 BPM | WEIGHT: 215.5 LBS | DIASTOLIC BLOOD PRESSURE: 70 MMHG | SYSTOLIC BLOOD PRESSURE: 126 MMHG | BODY MASS INDEX: 30.85 KG/M2 | RESPIRATION RATE: 16 BRPM | OXYGEN SATURATION: 98 % | HEIGHT: 70 IN | TEMPERATURE: 97.3 F

## 2024-08-21 DIAGNOSIS — C18.2 MALIGNANT NEOPLASM OF ASCENDING COLON (HCC): Primary | ICD-10-CM

## 2024-08-21 DIAGNOSIS — D50.0 IRON DEFICIENCY ANEMIA DUE TO CHRONIC BLOOD LOSS: ICD-10-CM

## 2024-08-21 PROCEDURE — 99215 OFFICE O/P EST HI 40 MIN: CPT | Performed by: INTERNAL MEDICINE

## 2024-08-21 NOTE — PATIENT INSTRUCTIONS
Blood work anytime.  Tentative appointment in 3 weeks.  Patient will be going to the [place] office to  kit for Ct DNA test.

## 2024-08-23 ENCOUNTER — APPOINTMENT (OUTPATIENT)
Dept: LAB | Facility: HOSPITAL | Age: 59
End: 2024-08-23
Payer: COMMERCIAL

## 2024-08-23 DIAGNOSIS — E78.49 OTHER HYPERLIPIDEMIA: ICD-10-CM

## 2024-08-23 DIAGNOSIS — Z79.4 TYPE 2 DIABETES MELLITUS WITHOUT COMPLICATION, WITH LONG-TERM CURRENT USE OF INSULIN (HCC): ICD-10-CM

## 2024-08-23 DIAGNOSIS — D62 ACUTE BLOOD LOSS ANEMIA: ICD-10-CM

## 2024-08-23 DIAGNOSIS — D50.0 IRON DEFICIENCY ANEMIA SECONDARY TO BLOOD LOSS (CHRONIC): ICD-10-CM

## 2024-08-23 DIAGNOSIS — E11.9 TYPE 2 DIABETES MELLITUS WITHOUT COMPLICATION, WITH LONG-TERM CURRENT USE OF INSULIN (HCC): ICD-10-CM

## 2024-08-23 DIAGNOSIS — C18.2 MALIGNANT NEOPLASM OF ASCENDING COLON (HCC): ICD-10-CM

## 2024-08-23 LAB
ALBUMIN SERPL BCG-MCNC: 4.3 G/DL (ref 3.5–5)
ALP SERPL-CCNC: 74 U/L (ref 34–104)
ALT SERPL W P-5'-P-CCNC: 16 U/L (ref 7–52)
ANION GAP SERPL CALCULATED.3IONS-SCNC: 8 MMOL/L (ref 4–13)
AST SERPL W P-5'-P-CCNC: 13 U/L (ref 5–45)
BASOPHILS # BLD AUTO: 0.02 THOUSANDS/ÂΜL (ref 0–0.1)
BASOPHILS NFR BLD AUTO: 0 % (ref 0–1)
BILIRUB SERPL-MCNC: 0.32 MG/DL (ref 0.2–1)
BUN SERPL-MCNC: 19 MG/DL (ref 5–25)
CALCIUM SERPL-MCNC: 9.2 MG/DL (ref 8.4–10.2)
CHLORIDE SERPL-SCNC: 105 MMOL/L (ref 96–108)
CO2 SERPL-SCNC: 25 MMOL/L (ref 21–32)
CREAT SERPL-MCNC: 0.98 MG/DL (ref 0.6–1.3)
CREAT UR-MCNC: 232.3 MG/DL
EOSINOPHIL # BLD AUTO: 0.1 THOUSAND/ÂΜL (ref 0–0.61)
EOSINOPHIL NFR BLD AUTO: 2 % (ref 0–6)
ERYTHROCYTE [DISTWIDTH] IN BLOOD BY AUTOMATED COUNT: 16.5 % (ref 11.6–15.1)
EST. AVERAGE GLUCOSE BLD GHB EST-MCNC: 177 MG/DL
GLUCOSE P FAST SERPL-MCNC: 196 MG/DL (ref 65–99)
HBA1C MFR BLD: 7.8 %
HCT VFR BLD AUTO: 37.3 % (ref 36.5–46.1)
HGB BLD-MCNC: 11.5 G/DL (ref 12–15.4)
IMM GRANULOCYTES # BLD AUTO: 0.02 THOUSAND/UL (ref 0–0.2)
IMM GRANULOCYTES NFR BLD AUTO: 0 % (ref 0–2)
LYMPHOCYTES # BLD AUTO: 1.56 THOUSANDS/ÂΜL (ref 0.6–4.47)
LYMPHOCYTES NFR BLD AUTO: 32 % (ref 14–44)
MCH RBC QN AUTO: 23.1 PG (ref 26.8–34.3)
MCHC RBC AUTO-ENTMCNC: 30.8 G/DL (ref 31.4–37.4)
MCV RBC AUTO: 75 FL (ref 82–98)
MICROALBUMIN UR-MCNC: 31.6 MG/L
MICROALBUMIN/CREAT 24H UR: 14 MG/G CREATININE (ref 0–30)
MONOCYTES # BLD AUTO: 0.6 THOUSAND/ÂΜL (ref 0.17–1.22)
MONOCYTES NFR BLD AUTO: 12 % (ref 4–12)
NEUTROPHILS # BLD AUTO: 2.52 THOUSANDS/ÂΜL (ref 1.85–7.62)
NEUTS SEG NFR BLD AUTO: 54 % (ref 43–75)
NRBC BLD AUTO-RTO: 0 /100 WBCS
PLATELET # BLD AUTO: 186 THOUSANDS/UL (ref 149–390)
PMV BLD AUTO: 9.7 FL (ref 8.9–12.7)
POTASSIUM SERPL-SCNC: 3.8 MMOL/L (ref 3.5–5.3)
PROT SERPL-MCNC: 6.8 G/DL (ref 6.4–8.4)
RBC # BLD AUTO: 4.97 MILLION/UL (ref 3.88–5.12)
SODIUM SERPL-SCNC: 138 MMOL/L (ref 135–147)
WBC # BLD AUTO: 4.82 THOUSAND/UL (ref 4.31–10.16)

## 2024-08-23 PROCEDURE — 80053 COMPREHEN METABOLIC PANEL: CPT

## 2024-08-23 PROCEDURE — 85025 COMPLETE CBC W/AUTO DIFF WBC: CPT

## 2024-08-23 PROCEDURE — 83036 HEMOGLOBIN GLYCOSYLATED A1C: CPT

## 2024-08-23 PROCEDURE — 36415 COLL VENOUS BLD VENIPUNCTURE: CPT

## 2024-08-23 PROCEDURE — 82570 ASSAY OF URINE CREATININE: CPT

## 2024-08-23 PROCEDURE — 82043 UR ALBUMIN QUANTITATIVE: CPT

## 2024-08-24 NOTE — PROGRESS NOTES
HPI: Patient's wife was on the FaceTime.  Transfer of care from Dr. Almaraz.  Oncological continuation of care for right colon cancer, stage III disease with 1 of 26 positive lymph node and positive lymphovascular invasion and perineural invasion, grade 2.  Patient had right hemicolectomy on 6/24/2024.  pT3 pN1a.  Intact MMR proteins.  Patient saw Dr. Almaraz and he recommended adjuvant chemotherapy.  Patient declined that.  Patient's surgeon mentioned about Ct DNA test.  Patient would like to have that done.  Patient has recovered from surgery.  There is a tiny nodule in the lung and that will be monitored.  Patient was not aware of that.  CEA was 4.8.  Patient had iron deficiency anemia.  Normal LFTs.  Patient has history of insulin-dependent diabetes mellitus and hyperlipidemia.  No history of peripheral neuropathy from diabetes.  Patient has recovered from surgery and he states he has regular bowel movements.    Current Outpatient Medications:   •  Alcohol Swabs 70 % PADS, May substitute brand based on insurance coverage. Check glucose ACHS., Disp: 200 each, Rfl: 0  •  aspirin (ECOTRIN LOW STRENGTH) 81 mg EC tablet, Take 1 tablet (81 mg total) by mouth daily, Disp: 90 tablet, Rfl: 3  •  atorvastatin (LIPITOR) 80 mg tablet, Take 1 tablet (80 mg total) by mouth daily with dinner, Disp: 90 tablet, Rfl: 3  •  Blood Glucose Monitoring Suppl (OneTouch Verio Reflect) w/Device KIT, May substitute brand based on insurance coverage. Check glucose ACHS., Disp: 1 kit, Rfl: 0  •  esomeprazole (NexIUM) 20 mg capsule, Take 20 mg by mouth every morning before breakfast, Disp: , Rfl:   •  Fiasp FlexTouch 100 units/mL injection pen, E11.65 Inject 3 units with breakfast, 5 units with lunch and dinner or as directed, TDD up to 30 units daily, Disp: 27 mL, Rfl: 1  •  glucose blood (OneTouch Verio) test strip, Check blood sugars 4 times daily, Disp: 360 each, Rfl: 2  •  Insulin Pen Needle (BD Pen Needle Andreia 2nd Gen) 32G X 4 MM MISC,  4 times daily insulin, Disp: 400 each, Rfl: 1  •  Insulin Pen Needle (BD Pen Needle Andreia 2nd Gen) 32G X 4 MM MISC, For use with insulin pen. Pharmacy may dispense brand covered by insurance., Disp: 100 each, Rfl: 0  •  Lantus SoloStar 100 units/mL SOPN, Inject 0.27 mL (27 Units total) under the skin daily at bedtime TDD up to 30 units daily (Patient taking differently: Inject 20 Units under the skin daily at bedtime TDD up to 30 units daily), Disp: 27 mL, Rfl: 1  •  OneTouch Delica Lancets 33G MISC, May substitute brand based on insurance coverage. Check glucose ACHS., Disp: 200 each, Rfl: 0  •  acetaminophen (TYLENOL) 325 mg tablet, Take 3 tablets (975 mg total) by mouth every 8 (eight) hours (Patient not taking: Reported on 8/12/2024), Disp: , Rfl:   •  nitroglycerin (NITROSTAT) 0.4 mg SL tablet, Place 1 tablet (0.4 mg total) under the tongue every 5 (five) minutes as needed for chest pain (Patient not taking: Reported on 8/21/2024), Disp: 30 tablet, Rfl: 0    No Known Allergies    Oncology History   Malignant neoplasm of ascending colon (HCC)   4/16/2024 Initial Diagnosis    April 16, 2024 EGD showed subcentimeter polyps in the stomach, antral mucosal erythema.  Colonoscopy showed ulcerated mass in the ascending colon.  Biopsy of the mass showed adenocarcinoma.  May 1, 2024 CT chest ab pelvis showed ascending colon mass.  3-5 mm bilateral pulmonary nodules.  No other suspicious lesions.     6/24/2024 Initial Diagnosis    June 24, 2024 patient underwent right hemicolectomy.  Pathology indicates adenocarcinoma, T3, N1, stage III.     6/24/2024 -  Cancer Staged    Staging form: Colon and Rectum, AJCC 8th Edition  - Pathologic stage from 6/24/2024: Stage IIIB (pT3, pN1, cM0) - Signed by Shadi Almaraz DO on 8/2/2024           ONCOLOGY HISTORY OF PRESENT ILLNESS        Oncology History   Malignant neoplasm of ascending colon (HCC)   4/16/2024 Initial Diagnosis    April 16, 2024 EGD showed subcentimeter polyps in  "the stomach, antral mucosal erythema.  Colonoscopy showed ulcerated mass in the ascending colon.  Biopsy of the mass showed adenocarcinoma.  May 1, 2024 CT chest ab pelvis showed ascending colon mass.  3-5 mm bilateral pulmonary nodules.  No other suspicious lesions.     6/24/2024 Initial Diagnosis    June 24, 2024 patient underwent right hemicolectomy.  Pathology indicates adenocarcinoma, T3, N1, stage III.     6/24/2024 -  Cancer Staged    Staging form: Colon and Rectum, AJCC 8th Edition  - Pathologic stage from 6/24/2024: Stage IIIB (pT3, pN1, cM0) - Signed by Shadi Almaraz DO on 8/2/2024           ROS:  08/24/24 Reviewed 12 systems: See symptoms in HPI  Presently no other neurological, cardiac, pulmonary, GI and  symptoms other than mentioned in HPI.  Other symptoms are in HPI. No fever, chills, bleeding, bone pains, skin rash, weight loss , night sweats, arthritic symptoms, tiredness ,  weakness, numbness,  claudication and gait problem. No frequent infections.  Not unusually sensitive to heat or cold. No swelling of the ankles. No swollen glands.  Patient is anxious.       /70 (BP Location: Left arm, Patient Position: Sitting, Cuff Size: Large)   Pulse 82   Temp (!) 97.3 °F (36.3 °C) (Temporal)   Resp 16   Ht 5' 10\" (1.778 m)   Wt 97.8 kg (215 lb 8 oz)   SpO2 98%   BMI 30.92 kg/m²     Physical Exam:  Alert, oriented, not in distress, vitals are above, no icterus, no oral thrush, no palpable neck mass, clear lung fields, regular heart rate, abdomen  soft and non tender, no palpable abdominal mass, no ascites, no edema of ankles, no calf tenderness, no focal neurological deficit, no skin rash, no palpable lymphadenopathy in the neck and axillary areas, no clubbing.    Patient is anxious.  Performance status 0 .      Pathology Result:    Final Diagnosis   Date Value Ref Range Status   06/24/2024   Final    A. Terminal ileum, appendix,  right colon (right hemicolectomy):  - Adenocarcinoma with " mucinous features (4.0cm)  - Lymph-vascular and perineural invasion present  - Metastatic carcinoma involves one (1) of twenty-six (26) lymph nodes (1/26)  - Margins negative for carcinoma   - See staging synoptic (pT3N1a)      Comment:  - MMR IHC was performed on the prior biopsy showing no loss of nuclear staining: low probability of MSI-H.   - Block A5 is sent for Caris testing.     Interpretation performed at [facility]- [address] [address]       04/16/2024   Final    A.  Duodenum, biopsy:     - No significant pathologic abnormalities.     - No villous atrophy, increased intraepithelial lymphocytes or crypt hyperplasia to suggest       malabsorptive enteropathy.     - No active inflammation, granulomas, organisms, dysplasia or neoplasia identified.    B.  Stomach, biopsy:     - Chronic inactive antral gastritis.     - No H. pylori organisms identified on H&E stained sections.     - No intestinal metaplasia, dysplasia or neoplasia identified.     C.  Stomach, polyp:     - Fundic gland polyp.     - No intestinal metaplasia, dysplasia or neoplasia identified.    D.  Ascending colon, mass, biopsy:     - Adenocarcinoma.    Comment:  On immunostaining the tumor cells are positive for CDX2 and SATB2 but negative for CK7, CK20, synaptophysin, and NKX3.1.  Coupled with the clinical history, these results favor a colonic primary.  Intradepartmental consultation is in agreement.  Dr. Ortega notified electronically (TopPatch) on 4/23/24 at 1030 hours.  Specimen forwarded for MMR testing with the results to be given in an addendum.    E.  Transverse colon, polyps:     - Tubular adenoma, fragments.     - Sessile serrated adenoma, fragments.     - No high grade dysplasia or carcinoma identified.    F.  Sigmoid colon, polyp:     - Tubular adenoma.     - No high grade dysplasia or carcinoma identified.            Image Results:   Image result are reviewed and documented in Hematology/Oncology  history    CT chest abdomen pelvis w contrast  Narrative: CT CHEST, ABDOMEN AND PELVIS WITH IV CONTRAST    INDICATION: C18.2: Malignant neoplasm of ascending colon.    COMPARISON: None.    TECHNIQUE: CT examination of the chest, abdomen and pelvis was performed. Multiplanar 2D reformatted images were created from the source data.    This examination, like all CT scans performed in the [facility], was performed utilizing techniques to minimize radiation dose exposure, including the use of iterative reconstruction and automated exposure control. Radiation dose length   product (DLP) for this visit: 1037.45 mGy-cm    IV Contrast: 100 mL of iohexol (OMNIPAQUE)  Enteric Contrast: Administered.    FINDINGS:    CHEST    LUNGS: There are multiple pulmonary nodules throughout the lungs measuring between 3 and 5 mm , indicated on series 4. Cannot exclude metastases. No tracheal or endobronchial lesion.    PLEURA: Unremarkable.    HEART/GREAT VESSELS: Coronary artery calcifications.. No thoracic aortic aneurysm.    MEDIASTINUM AND RAMA: Unremarkable.    CHEST WALL AND LOWER NECK: Unremarkable.    ABDOMEN    LIVER/BILIARY TREE: Unremarkable.    GALLBLADDER: No calcified gallstones. No pericholecystic inflammatory change.    SPLEEN: Unremarkable.    PANCREAS: Unremarkable.    ADRENAL GLANDS: Unremarkable.    KIDNEYS/URETERS: Unremarkable. No hydronephrosis.    STOMACH AND BOWEL: Colonic diverticulosis without findings of acute diverticulitis. As noted on the recent colonoscopy, there is a 4.9 x 4.3 x 4.4 cm soft tissue mass in the ascending colon.    APPENDIX: No findings to suggest appendicitis.    ABDOMINOPELVIC CAVITY: Nonspecific subcentimeter retroperitoneal lymph nodes. No ascites. No pneumoperitoneum.    VESSELS: Unremarkable for patient's age.    PELVIS    REPRODUCTIVE ORGANS: Enlarged prostate.    URINARY BLADDER: Unremarkable.    ABDOMINAL WALL/INGUINAL REGIONS: Small fat-containing left inguinal  hernia.    BONES: No acute fracture or suspicious osseous lesion.  Impression: 1. 4.9 cm mass in the ascending colon.  2. Numerous pulmonary nodules measuring 3 to 5 mm in size. These are nonspecific and given clinical history, consider short-term 3-month follow-up CT scan.    Workstation performed: [id number]      LABS:  Lab data are reviewed and documented in HemOnc history.       Lab Results   Component Value Date    HGB 11.5 (L) 08/23/2024    HCT 37.3 08/23/2024    MCV 75 (L) 08/23/2024     08/23/2024    WBC 4.82 08/23/2024    NRBC 0 08/23/2024     CBC and differential  Status: Final result      Contains abnormal data CBC and differential  Order: [id number]   Status: Final result       Visible to patient: Yes (seen)       Next appt: 08/29/2024 at 03:00 PM in Endocrinology (Ulises Menezes [facility], DO)       Dx: Malignant neoplasm of ascending colon...    0 Result Notes            Component  Ref Range & Units 8/23/24  5:17 PM 6/25/24  5:11 AM 6/24/24  2:52 PM 6/20/24  4:45 PM 6/13/24  4:39 PM 6/6/24  4:33 PM 5/30/24  2:24 PM   WBC  4.31 - 10.16 Thousand/uL 4.82 8.28  5.29 4.56 4.62 4.24 Low    RBC  3.88 - 5.12 Million/uL 4.97 4.06 R  4.91 R 4.54 R 4.79 R 4.24 R   Hemoglobin  12.0 - 15.4 g/dL 11.5 Low  9.4 Low  R  11.2 Low  R 10.2 Low  R 10.2 Low  R 8.6 Low  R   Hematocrit  36.5 - 46.1 % 37.3 31.7 Low  R  37.9 R 34.9 Low  R 36.8 R 31.3 Low  R   MCV  82 - 98 fL 75 Low  78 Low   77 Low  77 Low  77 Low  74 Low    MCH  26.8 - 34.3 pg 23.1 Low  23.2 Low   22.8 Low  22.5 Low  21.3 Low  20.3 Low    MCHC  31.4 - 37.4 g/dL 30.8 Low  29.7 Low   29.6 Low  29.2 Low  27.7 Low  27.5 Low    RDW  11.6 - 15.1 % 16.5 High  25.1 High   26.1 High  27.4 High  27.8 High  23.4 High    MPV  8.9 - 12.7 fL 9.7 10.1 9.0 9.9 9.5 9.5 9.7   Platelets  149 - 390 Thousands/uL 186 170 149 224 194 252 230   nRBC  /100 WBCs 0 0  0 0 0 0   Segmented %  43 - 75 % 54 83 High   57 56 54 59   Immature Grans %  0 - 2 % 0 0  0 0 0 0   Lymphocytes %  14 -  44 % 32 8 Low   28 29 30 27   Monocytes %  4 - 12 % 12 9  12 12 12 11   Eosinophils Relative  0 - 6 % 2 0  2 2 3 2   Basophils Relative  0 - 1 % 0 0  1 1 1 1   Absolute Neutrophils  1.85 - 7.62 Thousands/µL 2.52 6.84  3.01 2.59 2.54 2.54   Absolute Immature Grans  0.00 - 0.20 Thousand/uL 0.02 0.03  0.01 0.01 0.01 0.01   Absolute Lymphocytes  0.60 - 4.47 Thousands/µL 1.56 0.63  1.48 1.30 1.37 1.13   Absolute Monocytes  0.17 - 1.22 Thousand/µL 0.60 0.78  0.64 0.53 0.53 0.46   Eosinophils Absolute  0.00 - 0.61 Thousand/µL 0.10 0.00  0.11 0.10 0.14 0.08   Basophils Absolute  0.00 - 0.10 Thousands/µL 0.02 0.00  0.04 0.03 0.03 0.02         Result Note       1 Patient Communication      Component  Ref Range & Units 4/27/24 11:26 AM   CEA  0.0 - 3.0 ng/mL 4.8 High    Comment: Normal/Non-Smoker: 0.0-3.0 ng/mL  Normal/Smoker:     0.0-5.0 ng/mL            n support.      Contains abnormal data PSA, TOTAL (SCREENING)  Order: [id number]  Component  Ref Range & Units 8/14/24  9:04 AM   PSA, Total  <4.00 ng/mL 4.37 High    Comment: Testing performed using [employer]. Results obtained from different manufacturers and methods cannot be used interchangeably.                      Lab Results   Component Value Date    K 3.8 08/23/2024     08/23/2024    CO2 25 08/23/2024    BUN 19 08/23/2024    CREATININE 0.98 08/23/2024    GLUF 196 (H) 08/23/2024    CALCIUM 9.2 08/23/2024    AST 13 08/23/2024    ALT 16 08/23/2024    ALKPHOS 74 08/23/2024    EGFR 86 06/26/2024   Comprehensive metabolic panel  Status: Final result      Contains abnormal data Comprehensive metabolic panel  Order: [id number]   Status: Final result       Visible to patient: Yes (seen)       Next appt: 08/29/2024 at 03:00 PM in Endocrinology (Ulises Menezes [facility], DO)       Dx: Malignant neoplasm of ascending colon...    0 Result Notes            Component  Ref Range & Units 8/23/24  5:17 PM 6/26/24  6:18 AM 6/25/24  5:11 AM 4/27/24 11:26 AM 4/20/24 10:13 AM 4/10/24   3:50 PM 4/10/24  3:50 PM   Sodium  135 - 147 mmol/L 138 139 140 136 138 137    Potassium  3.5 - 5.3 mmol/L 3.8 4.0 4.0 3.9 4.3 4.0    Chloride  96 - 108 mmol/L 105 105 106 101 105 106    CO2  21 - 32 mmol/L 25 26 24 25 25 26    ANION GAP  4 - 13 mmol/L 8 8 10 10 8 5    BUN  5 - 25 mg/dL 19 15 16 19 19 17    Creatinine  0.60 - 1.30 mg/dL 0.98 0.96 CM 0.88 CM 1.19 CM 1.18 CM 1.08 CM    Comment: Standardized to IDMS reference method   Glucose, Fasting  65 - 99 mg/dL 196 High          Calcium  8.4 - 10.2 mg/dL 9.2 8.2 Low  8.2 Low  9.3 9.0 8.8    AST  5 - 45 U/L 13   15 R   17 R   ALT  7 - 52 U/L 16   15 CM   19 CM   Comment: Specimen collection should occur prior to Sulfasalazine administration due to the potential for falsely depressed results.   Alkaline Phosphatase  34 - 104 U/L 74   69   62   Total Protein  6.4 - 8.4 g/dL 6.8   6.6   6.2 Low    Albumin  3.5 - 5.0 g/dL 4.3   4.4   4.1   Total Bilirubin  0.20 - 1.00 mg/dL 0.32   0.73 CM   0.34 CM   Comment: Use of this assay is not recommended for patients undergoing treatment with eltrombopag due to the potential for falsely elevated results.  N-acetyl-p-benzoquinone imine (metabolite of Acetaminophen) will generate erroneously low results in samples for patients that have taken an overdose of Acetaminophen.   eGFR  86 R 94 R 66 R 67 R 74 R                          Lab Results   Component Value Date    IRON 32 (L) 05/08/2024    TIBC 663 (H) 05/08/2024    FERRITIN 5 (L) 05/08/2024    FERRITIN 4 (L) 04/12/2024       Lab Results   Component Value Date    [id number] 348 04/12/2024             Reviewed and discussed with patient.    Assessment and plan: See diagnoses, orders and instructions below.  Patient's wife was on the FaceTime.  Transfer of care from Dr. Almaraz.  Oncological continuation of care for right colon cancer, stage III disease with 1 of 26 positive lymph node and positive lymphovascular invasion and perineural invasion, grade 2.  Patient had right  hemicolectomy on 6/24/2024.  pT3 pN1a.  Intact MMR proteins.  Patient saw Dr. Almaraz and he recommended adjuvant chemotherapy.  Patient declined that.  Patient's surgeon mentioned about Ct DNA test.  Patient would like to have that done.  Patient has recovered from surgery.  There is a tiny nodule in the lung and that will be monitored.  Patient was not aware of that.  CEA was 4.8.  Patient had iron deficiency anemia.  Normal LFTs.  Patient has history of insulin-dependent diabetes mellitus and hyperlipidemia.  No history of peripheral neuropathy from diabetes.  Patient has recovered from surgery and he states he has regular bowel movements.  Physical examination and test results are as recorded and discussed in detail.  Discussed patient characteristics.  Discussed cancer characteristics.  Discussed standard of care and that is adjuvant chemotherapy for stage III colon cancer.  I explained that Ct DNA test is generally not done for stage III disease.  Sometimes we do it for stage II to decide the need for adjuvant chemotherapy.  Sometimes we do it for stage IV for response to therapy.  We discussed FOLFOX.  We discussed XELOX.  We discussed Xeloda.  We discussed 3 months versus 6 months of systemic adjuvant chemotherapy.  We discussed all this in very much detail.  He wanted to make his decision based on Ct DNA and he felt strongly about that.  Ordered Ct DNA test and patient went to [place] office to  the kit.  Patient will be notified of the results.  I told him that time is important for starting adjuvant chemotherapy that is generally started within 5-6 weeks of surgery and he is already 2 months out and he should be starting sooner and not depend on Ct DNA test.  He wanted to wait.  It was a long discussion with the patient and his wife.  I told him about iron deficiency anemia probably secondary to colon malignancy and chronic blood loss.  I told him about tiny lung lesion and monitoring of  that.  All discussed in very much detail.  Questions answered.  Discussed the importance of eating healthy food approved by his PCP and other consultants.  Activities as tolerated.  Health screening test.  Goal will be cure from colon cancer.  Patient is capable of self-care.  I told him about increased PSA and he was aware of that and he said his primary physician is going to take care of that.  I mentioned about referral to a urologist but he wants his PCP to take care of that.    1. Malignant neoplasm of ascending colon (HCC)    - CBC and differential; Future  - Comprehensive metabolic panel; Future  - Ferritin; Future  - CEA; Future    2. Iron deficiency anemia due to chronic blood loss    - Ferritin; Future  3.  Insulin dependent diabetes mellitus  4.  Hyperlipidemia  5.  High PSA    Blood work anytime.  Tentative appointment in 3 weeks.  Patient will be going to the [place] office to  kit for Ct DNA test.     .  Patient will continue to follow with  primary physician and other consultants.  Patient  voiced understanding and agrees      Disclaimer: This document was prepared using a dictation device . If a word or phrase is confusing, or does not make sense, this is likely due to recognition error which was not discovered during the providers review. If you believe an error has occurred, please Contact me through [facility] HOPE Line service for ondina?cation.    Counseling / Coordination of Care   .  Provided counseling and support

## 2024-08-28 ENCOUNTER — TELEPHONE (OUTPATIENT)
Age: 59
End: 2024-08-28

## 2024-08-29 ENCOUNTER — OFFICE VISIT (OUTPATIENT)
Dept: ENDOCRINOLOGY | Facility: CLINIC | Age: 59
End: 2024-08-29
Payer: COMMERCIAL

## 2024-08-29 VITALS
TEMPERATURE: 98 F | HEART RATE: 82 BPM | HEIGHT: 70 IN | DIASTOLIC BLOOD PRESSURE: 78 MMHG | OXYGEN SATURATION: 96 % | BODY MASS INDEX: 31.04 KG/M2 | WEIGHT: 216.8 LBS | SYSTOLIC BLOOD PRESSURE: 136 MMHG

## 2024-08-29 DIAGNOSIS — E11.9 TYPE 2 DIABETES MELLITUS WITHOUT COMPLICATION, WITH LONG-TERM CURRENT USE OF INSULIN (HCC): Primary | ICD-10-CM

## 2024-08-29 DIAGNOSIS — E78.49 OTHER HYPERLIPIDEMIA: ICD-10-CM

## 2024-08-29 DIAGNOSIS — Z79.4 TYPE 2 DIABETES MELLITUS WITHOUT COMPLICATION, WITH LONG-TERM CURRENT USE OF INSULIN (HCC): Primary | ICD-10-CM

## 2024-08-29 PROCEDURE — 99214 OFFICE O/P EST MOD 30 MIN: CPT | Performed by: STUDENT IN AN ORGANIZED HEALTH CARE EDUCATION/TRAINING PROGRAM

## 2024-08-29 RX ORDER — METFORMIN HCL 500 MG
2000 TABLET, EXTENDED RELEASE 24 HR ORAL
Qty: 400 TABLET | Refills: 1 | Status: SHIPPED | OUTPATIENT
Start: 2024-08-29

## 2024-08-29 RX ORDER — INSULIN GLARGINE 100 [IU]/ML
INJECTION, SOLUTION SUBCUTANEOUS
Qty: 27 ML | Refills: 1 | Status: SHIPPED | OUTPATIENT
Start: 2024-08-29

## 2024-08-29 NOTE — ASSESSMENT & PLAN NOTE
Doing well. Brent is interested in plan to reduce insulin. We will look into oral therapy options. Reviewed options, including metformin, glp and sglt2i. I explained that glp and sglt2i may have ascvd/ckd disease modifying behavior. Pt interested in metformin. Pro/cons reviewed. Start metformin xr 500 mg daily, increase weekly to 2g daily. Decrease lantus 12 units daily. May consider termination if bg stable. Will plan reassessment in 3-mo

## 2024-08-29 NOTE — PROGRESS NOTES
Ambulatory Visit  Name: Brent Hall      : 1965      MRN: [id number]  Encounter Provider: Ulises Lynne, DO  Encounter Date: 2024   Encounter department: [facility]    Assessment & Plan   1. Type 2 diabetes mellitus without complication, with long-term current use of insulin (HCC)  Assessment & Plan:  Doing well. Brent is interested in plan to reduce insulin. We will look into oral therapy options. Reviewed options, including metformin, glp and sglt2i. I explained that glp and sglt2i may have ascvd/ckd disease modifying behavior. Pt interested in metformin. Pro/cons reviewed. Start metformin xr 500 mg daily, increase weekly to 2g daily. Decrease lantus 12 units daily. May consider termination if bg stable. Will plan reassessment in 3-mo  Orders:  -     Lantus SoloStar 100 units/mL SOPN; E11.65 inject 12 units daily.  -     metFORMIN (GLUCOPHAGE-XR) 500 mg 24 hr tablet; Take 4 tablets (2,000 mg total) by mouth daily with breakfast  -     Hemoglobin A1C; Future; Expected date: 2024  -     Basic metabolic panel; Future; Expected date: 2024  -     Lipid Panel with Direct LDL reflex; Future; Expected date: 2024  -     Albumin / creatinine urine ratio; Future; Expected date: 2024  2. Other hyperlipidemia  Assessment & Plan:  Continue statin    RTC 3-4 mo    History of Present Illness     Brent Hall is a 58 y.o. adult who presents in f/u of T2D. T2D c/b hx MI. Pt also w recent hx of GI surgery for adenocarcinoma of right colon, s/p hemicolectomy. He is doing well. He is presently on therapy with lantus 20 units qHS. He is off prandial insulin. He comes with CBG log showing values within 90 - 180 range, varying times of day. No hyperglycemic sx, no hypoglycemia. He is on high intensity statin.     Review of Systems   Constitutional:  Negative for diaphoresis and unexpected weight change.   Gastrointestinal:  Negative for nausea and  "vomiting.   Endocrine: Negative for polydipsia and polyuria.   All other systems reviewed and are negative.      Objective     /78 (BP Location: Left arm, Patient Position: Sitting)   Pulse 82   Temp 98 °F (36.7 °C)   Ht 5' 10\" (1.778 m)   Wt 98.3 kg (216 lb 12.8 oz)   SpO2 96%   BMI 31.11 kg/m²     Physical Exam  Vitals reviewed.   Constitutional:       General: He is not in acute distress.     Appearance: Normal appearance.   HENT:      Head: Normocephalic and atraumatic.   Eyes:      General: No scleral icterus.     Conjunctiva/sclera: Conjunctivae normal.   Pulmonary:      Effort: Pulmonary effort is normal. No respiratory distress.   Musculoskeletal:         General: No deformity.      Cervical back: Normal range of motion.   Neurological:      General: No focal deficit present.      Mental Status: He is alert.   Psychiatric:         Mood and Affect: Mood normal.         Behavior: Behavior normal.       Lab Results   Component Value Date    SODIUM 138 08/23/2024    K 3.8 08/23/2024     08/23/2024    CO2 25 08/23/2024    AGAP 8 08/23/2024    BUN 19 08/23/2024    CREATININE 0.98 08/23/2024    GLUC 116 06/26/2024    GLUF 196 (H) 08/23/2024    CALCIUM 9.2 08/23/2024    AST 13 08/23/2024    ALT 16 08/23/2024    ALKPHOS 74 08/23/2024    TP 6.8 08/23/2024    TBILI 0.32 08/23/2024    EGFR 86 06/26/2024     Lab Results   Component Value Date    HGBA1C 7.8 (H) 08/23/2024     Lab Results   Component Value Date    CHOLESTEROL 209 (H) 12/10/2023     Lab Results   Component Value Date    HDL 35 (L) 12/10/2023     Lab Results   Component Value Date    TRIG 306 (H) 12/10/2023     No results found for: \"NONHDLC\"      Administrative Statements           "

## 2024-09-11 ENCOUNTER — DOCUMENTATION (OUTPATIENT)
Dept: HEMATOLOGY ONCOLOGY | Facility: CLINIC | Age: 59
End: 2024-09-11

## 2024-09-11 NOTE — PROGRESS NOTES
Contacted [facility] on 9/10/2024 @12:45 PM to inquire on the status of this patient's test per Dr. Leyva.    Spoke to Giselle who stated that they were in receipt of 2 requests, one submitted on their portal under Dr. Almaraz and one received via fax under Dr. Leyva.    [facility] confirmed receipt of the blood on 8/26 and the tissue on 8/29, with an expected turnaround time of 3-4 weeks from 8/29/2024.    Giselle requested that I resubmit Dr. Leyva's order along with the recent office notes via fax. Sent and scanned in media.    FAX: [phone]  TEL: [phone]

## 2024-09-12 ENCOUNTER — LAB REQUISITION (OUTPATIENT)
Dept: LAB | Facility: HOSPITAL | Age: 59
End: 2024-09-12

## 2024-09-12 DIAGNOSIS — C18.2 MALIGNANT NEOPLASM OF ASCENDING COLON (HCC): ICD-10-CM

## 2024-09-12 DIAGNOSIS — C18.9 MALIGNANT NEOPLASM OF COLON, UNSPECIFIED (HCC): ICD-10-CM

## 2024-09-16 ENCOUNTER — TELEPHONE (OUTPATIENT)
Dept: HEMATOLOGY ONCOLOGY | Facility: CLINIC | Age: 59
End: 2024-09-16

## 2024-09-16 DIAGNOSIS — C18.2 MALIGNANT NEOPLASM OF ASCENDING COLON (HCC): Primary | ICD-10-CM

## 2024-09-16 NOTE — TELEPHONE ENCOUNTER
Left message for patient to follow up with recent call from Dr. Leyva.  Advised Dr. Leyva ordered labwork to be completed around 11/13 and then he will see him at the [place] office on 11/20 at 12:20pm.   Advised patient to call office if this appointment time will not work with his schedule.  [facility] number provided

## 2024-09-16 NOTE — TELEPHONE ENCOUNTER
Patient called back and I gave him the Ct DNA report and said no chemo..  Ordered CBC, CMP and CEA prior to next visit in 2-3 months

## 2024-09-16 NOTE — TELEPHONE ENCOUNTER
I left a message for the patient on his cell phone and his wife's cell phone that I have the report of Ct DNA and they can call me back . I left my name and office phone number.

## 2024-10-17 ENCOUNTER — APPOINTMENT (OUTPATIENT)
Dept: LAB | Facility: HOSPITAL | Age: 59
End: 2024-10-17
Payer: COMMERCIAL

## 2024-10-17 LAB
CHOLEST SERPL-MCNC: 106 MG/DL
FERRITIN SERPL-MCNC: 10 NG/ML (ref 24–307)
HDLC SERPL-MCNC: 40 MG/DL
IRON SATN MFR SERPL: 8 % (ref 15–50)
IRON SERPL-MCNC: 38 UG/DL (ref 50–212)
LDLC SERPL CALC-MCNC: 28 MG/DL (ref 0–100)
TIBC SERPL-MCNC: 467 UG/DL (ref 250–450)
TRIGL SERPL-MCNC: 189 MG/DL
UIBC SERPL-MCNC: 429 UG/DL (ref 155–355)

## 2024-10-18 ENCOUNTER — OFFICE VISIT (OUTPATIENT)
Dept: CARDIOLOGY CLINIC | Facility: CLINIC | Age: 59
End: 2024-10-18
Payer: COMMERCIAL

## 2024-10-18 VITALS
SYSTOLIC BLOOD PRESSURE: 118 MMHG | DIASTOLIC BLOOD PRESSURE: 70 MMHG | TEMPERATURE: 98 F | WEIGHT: 215.2 LBS | HEIGHT: 70 IN | OXYGEN SATURATION: 98 % | HEART RATE: 108 BPM | BODY MASS INDEX: 30.81 KG/M2

## 2024-10-18 DIAGNOSIS — I25.10 CORONARY ARTERY DISEASE INVOLVING NATIVE CORONARY ARTERY OF NATIVE HEART WITHOUT ANGINA PECTORIS: Primary | ICD-10-CM

## 2024-10-18 DIAGNOSIS — E78.49 OTHER HYPERLIPIDEMIA: ICD-10-CM

## 2024-10-18 DIAGNOSIS — R00.0 TACHYCARDIA: ICD-10-CM

## 2024-10-18 DIAGNOSIS — E66.811 CLASS 1 OBESITY DUE TO EXCESS CALORIES WITHOUT SERIOUS COMORBIDITY WITH BODY MASS INDEX (BMI) OF 30.0 TO 30.9 IN ADULT: ICD-10-CM

## 2024-10-18 DIAGNOSIS — E66.09 CLASS 1 OBESITY DUE TO EXCESS CALORIES WITHOUT SERIOUS COMORBIDITY WITH BODY MASS INDEX (BMI) OF 30.0 TO 30.9 IN ADULT: ICD-10-CM

## 2024-10-18 PROCEDURE — 93000 ELECTROCARDIOGRAM COMPLETE: CPT | Performed by: NURSE PRACTITIONER

## 2024-10-18 PROCEDURE — 99214 OFFICE O/P EST MOD 30 MIN: CPT | Performed by: NURSE PRACTITIONER

## 2024-10-18 RX ORDER — ATORVASTATIN CALCIUM 40 MG/1
40 TABLET, FILM COATED ORAL
Qty: 90 TABLET | Refills: 3 | Status: SHIPPED | OUTPATIENT
Start: 2024-10-18

## 2024-10-18 NOTE — ASSESSMENT & PLAN NOTE
Lipid panel 12/10/2023: C 209. T 306. H 35. L 113.  Goal LDL < 55.  Repeat lipid panel 10/17/2024: C 106. T 189. H 40. L 28  OK to reduce atorvastatin from 80 mg to 40 mg daily.

## 2024-10-18 NOTE — ASSESSMENT & PLAN NOTE
Body mass index is 30.88 kg/m².  Reviewed dietary and exercise modifications for weight control.  Will monitor.

## 2024-10-18 NOTE — PATIENT INSTRUCTIONS
Reduce atorvastatin from 80 mg to 40 mg.  Monitor heart rates at home and notify me if staying in the 90s.

## 2024-10-18 NOTE — PROGRESS NOTES
Cardiology Follow Up    Brent Hall  1965  [id number]  [facility] [place] [address]  [address]LO[address]  [facility] [address] [phone]    Brent presents for close follow up of CAD.     1. Coronary artery disease involving native coronary artery of native heart without angina pectoris  Assessment & Plan:  A. Inferior STEMI 12/9/23.  B. Cardiac cath 12/11/2023: s/p thrombolytic therapy with successful clinical reperfusion. Residual MLI in LM, 30% LAD(m), 50% Lcx(m), 40% RCA(m).  C. Echo 12/10/23 with EF 55% and hypokinesis of the posterobasal wall.  - - - - -  No recurrent chest pain.  DAPT discontinued and placed on Plavix alone in April 2024 due to acute blood loss anemia related to colon ca.  He has been off anti-platelet completely since colon resection in late June. I reviewed with him today the rationale for long-term antiplatelet use in the setting of known CAD with history of thrombosis.  He is willing to stay on aspirin 81 mg daily.  Ok to reduce atorvastatin from 80 mg to 40 mg. Goal LDL < 55.  Discussed urgent s/s to report.  Orders:  -     atorvastatin (LIPITOR) 40 mg tablet; Take 1 tablet (40 mg total) by mouth daily with dinner  2. Other hyperlipidemia  Assessment & Plan:  Lipid panel 12/10/2023: C 209. T 306. H 35. L 113.  Goal LDL < 55.  Repeat lipid panel 10/17/2024: C 106. T 189. H 40. L 28  OK to reduce atorvastatin from 80 mg to 40 mg daily.  3. Tachycardia  Comments:  HR 99 bpm in sinus rhythm on ECG. Off beta blocker. Asked patient to monitor and report if staying > 80 bpm.  Orders:  -     POCT ECG  4. Class 1 obesity due to excess calories without serious comorbidity with body mass index (BMI) of 30.0 to 30.9 in adult  Assessment & Plan:  Body mass index is 30.88 kg/m².  Reviewed dietary and exercise modifications for weight control.  Will monitor.     HPI  Brent has a past medical history of CAD, inferior STEMI 12/9/2023 treated with  "TNK, HTN, HLD, T2DM.     Brent presented to [facility] ER 12/9/23 with ongoing mid sternal chest discomfort which he describes as \"indigestion\" lasting for hours and intermittent left arm discomfort. He had experienced similar symptoms 3 times prior in the past week while on a hunting trip but the symptoms resolved within 15 minutes. He went to the hospital where ECG showed inferior STEMI and HS trop was 1300. He was flown to [facility], however, transport was delayed and he received TNK which completely resolved his symptoms. Cardiac catheterization revealed mild, non-obstructive triple vessel disease. Echocardiogram showed EF 55% with hypokinesis of the posterobasal segment, no significant valvular abnormality. He was started on DAPT with ASA and Brilinta, atorvastatin 80 mg daily, and metoprolol succinate 25 mg daily. He was found to be diabetic as well with Ha1c of 14. He was started on insulin and given a referral to endocrinology. Prior to this admission he had not been to primary care in over a year. He was unaware of any medical issues leading up to this point. He does have a family history of diabetes. Father has valvular heart disease.      He followed up with me on 4/10/2024 at which time he felt \"terrible\" for months. He reported fatigue, shortness of breath, erectile dysfunction. In the past week he reported developing a frequent dry cough which is worse with exertion or lying down. No chest pain or overt lightheadedness. No syncope/near syncope. No palpitations. He had gained 17 pounds which he feels is due to being sedentary due to activity intolerance. He does admit to onset of leg swelling. He has been taking his medications faithfully and feels he is having side effects. He skipped a dose of metoprolol this morning to see if it would help him feel better. He reports having daily GI upset. He denies ruth bleeding or tarry stools. ECG shows ST at 101 bpm. I ordered blood work and chest xray. Chest xray was clear " but labs showed anemia with drop in hemoglobin to 7.1. I changed him from aspirin and Brilinta to Plavix 75 mg daily and added BID Protonix. I placed an urgent referral to GI and hematology who saw him on 4/12/24. GI performed an EGD and colonoscopy on 4/16/24 which revealed a colon mass. He has been referred to colorectal surgery. Hematology is coordinating iron infusions.     He was admitted to [facility] 6/24-6/26/2024 for robotic right hemicolectomy and resection of his adenocarcinoma. His post-operative course was unremarkable. He was discharged home on Lovenox injections and instructed to stay off Plavix until 6/29/2024. He opted not to resume Plavix at all. He also opted to wean off his beta blocker.     10/18/2024: Brent presents today for routine follow up. He continues to do well post colon mass resection. He is now following with Dr. Leyva. He underwent DNA testing which was negative. He has opted to not pursue adjuvant chemotherapy. He will continue to follow with colorectal surgery and oncology for surveillance. He states he feels great. He reports complete resolution of his dyspnea on exertion and edema. He has no chest pain. ECG today shows SR with HR 99 bpm. He states this is typically in the 80s at home. He denies palpitations/heart racing. No lightheadedness or dizziness. Recent lab work shows persistent iron deficiency however anemia has improved with hemoglobin now at 11. LDL was 28 on labs drawn yesterday.     Medical Problems       Problem List       Transaminitis    Other hyperlipidemia    History of ST elevation myocardial infarction (STEMI)    Coronary artery disease involving native coronary artery of native heart without angina pectoris    Type 2 diabetes mellitus without complication, with long-term current use of insulin (HCC)    Gastroesophageal reflux disease without esophagitis    Obesity    Melena    Acute blood loss anemia    Colonic mass    Localized edema    Iron deficiency anemia due  to chronic blood loss    Malignant neoplasm of ascending colon (HCC)    Chronic kidney disease (CKD) stage G2/A2, mildly decreased glomerular filtration rate (GFR) between 60-89 mL/min/1.73 square meter and albuminuria creatinine ratio between  mg/g        Past Medical History:   Diagnosis Date    Anemia     CAD (coronary artery disease)     Diabetes mellitus (HCC)     GERD (gastroesophageal reflux disease)     Hyperlipidemia     Hypertension     Pt denies    Obesity (BMI 30-39.9)      Social History     Socioeconomic History    Marital status: /Civil Union     Spouse name: Not on file    Number of children: Not on file    Years of education: Not on file    Highest education level: Not on file   Occupational History    Not on file   Tobacco Use    Smoking status: Former     Current packs/day: 0.00     Types: Cigarettes     Quit date: 2010     Years since quittin.8     Passive exposure: Never    Smokeless tobacco: Never   Vaping Use    Vaping status: Never Used   Substance and Sexual Activity    Alcohol use: Not Currently     Comment: very rare    Drug use: Never    Sexual activity: Not on file     Comment: defer   Other Topics Concern    Not on file   Social History Narrative    Not on file     Social Determinants of Health     Financial Resource Strain: Low Risk  (2024)    [facility]    Overall Financial Resource Strain (CARDIA)     Difficulty of Paying Living Expenses: Not very hard   Food Insecurity: No Food Insecurity (2024)    Hunger Vital Sign     Worried About Running Out of Food in the Last Year: Never true     Ran Out of Food in the Last Year: Never true   Transportation Needs: No Transportation Needs (2024)    PRAPARE - Transportation     Lack of Transportation (Medical): No     Lack of Transportation (Non-Medical): No   Physical Activity: Not on file   Stress: No Stress Concern Present (2024)     [facility]    [employer] [place] [employer] - Occupational Stress Questionnaire     Feeling of Stress : Not at all   Social Connections: Unknown (6/18/2024)    Received from [employer]     How often do you feel lonely or isolated from those around you? (Adult - for ages 18 years and over): Not on file   Intimate Partner Violence: Not At Risk (5/8/2024)    [facility]    Humiliation, Afraid, Rape, and Kick questionnaire     Fear of Current or Ex-Partner: No     Emotionally Abused: No     Physically Abused: No     Sexually Abused: No   Housing Stability: Low Risk  (6/26/2024)    Housing Stability Vital Sign     Unable to Pay for Housing in the Last Year: No     Number of Times Moved in the Last Year: 0     Homeless in the Last Year: No      Family History   Problem Relation Age of Onset    Diabetes Mother     Diabetes Father     Valvular heart disease Father     Atrial fibrillation Father     Diabetes Sister     Lung disease Sister     Heart failure Sister      Past Surgical History:   Procedure Laterality Date    CARDIAC CATHETERIZATION Left 12/11/2023    Procedure: Cardiac Left Heart Cath;  Surgeon: Nacho Salinas DO;  Location: [facility];  Service: Cardiology    CARDIAC CATHETERIZATION N/A 12/11/2023    Procedure: Cardiac Coronary Angiogram;  Surgeon: Nacho Salinas DO;  Location: [facility];  Service: Cardiology    CARDIAC CATHETERIZATION  12/11/2023    Procedure: Cardiac catheterization;  Surgeon: Nacho Salinas DO;  Location: [facility];  Service: Cardiology    WA LAPAROSCOPY COLECTOMY PARTIAL W/ANASTOMOSIS N/A 6/24/2024    Procedure: RIGHT ROBOTIC HEMICOLECTOMY LAPAROSCOPIC W/ ROBOTICS;  Surgeon: Azam Carreon MD;  Location: [facility]IN [facility];  Service: Colorectal    ULNAR NERVE REPAIR Left 2018    WRIST  ARTHRODESIS Bilateral     2019, 2014       Current Outpatient Medications:     Alcohol Swabs 70 % PADS, May substitute brand based on insurance coverage. Check glucose ACHS., Disp: 200 each, Rfl: 0    aspirin (ECOTRIN LOW STRENGTH) 81 mg EC tablet, Take 1 tablet (81 mg total) by mouth daily, Disp: 90 tablet, Rfl: 3    atorvastatin (LIPITOR) 40 mg tablet, Take 1 tablet (40 mg total) by mouth daily with dinner, Disp: 90 tablet, Rfl: 3    Blood Glucose Monitoring Suppl (OneTouch Verio Reflect) w/Device KIT, May substitute brand based on insurance coverage. Check glucose ACHS., Disp: 1 kit, Rfl: 0    esomeprazole (NexIUM) 20 mg capsule, Take 20 mg by mouth every morning before breakfast, Disp: , Rfl:     glucose blood (OneTouch Verio) test strip, Check blood sugars 4 times daily, Disp: 360 each, Rfl: 2    Insulin Pen Needle (BD Pen Needle Andreia 2nd Gen) 32G X 4 MM MISC, 4 times daily insulin, Disp: 400 each, Rfl: 1    Insulin Pen Needle (BD Pen Needle Andreia 2nd Gen) 32G X 4 MM MISC, For use with insulin pen. Pharmacy may dispense brand covered by insurance., Disp: 100 each, Rfl: 0    Lantus SoloStar 100 units/mL SOPN, E11.65 inject 12 units daily., Disp: 27 mL, Rfl: 1    metFORMIN (GLUCOPHAGE-XR) 500 mg 24 hr tablet, Take 4 tablets (2,000 mg total) by mouth daily with breakfast, Disp: 400 tablet, Rfl: 1    nitroglycerin (NITROSTAT) 0.4 mg SL tablet, Place 1 tablet (0.4 mg total) under the tongue every 5 (five) minutes as needed for chest pain, Disp: 30 tablet, Rfl: 0    OneTouch Delica Lancets 33G MISC, May substitute brand based on insurance coverage. Check glucose ACHS., Disp: 200 each, Rfl: 0    acetaminophen (TYLENOL) 325 mg tablet, Take 3 tablets (975 mg total) by mouth every 8 (eight) hours (Patient not taking: Reported on 8/12/2024), Disp: , Rfl:     Fiasp FlexTouch 100 units/mL injection pen, E11.65 Inject 3 units with breakfast, 5 units with lunch and dinner or as directed, TDD up to 30 units daily (Patient not  taking: Reported on 10/18/2024), Disp: 27 mL, Rfl: 1  No Known Allergies    Labs:     Chemistry        Component Value Date/Time    K 3.8 08/23/2024 1717    K 4.8 07/30/2020 1231     08/23/2024 1717     07/30/2020 1231    CO2 25 08/23/2024 1717    CO2 27 07/30/2020 1231    BUN 19 08/23/2024 1717    BUN 18 07/30/2020 1231    CREATININE 0.98 08/23/2024 1717    CREATININE 1.12 07/30/2020 1231        Component Value Date/Time    CALCIUM 9.2 08/23/2024 1717    CALCIUM 9.2 07/30/2020 1231    ALKPHOS 74 08/23/2024 1717    ALKPHOS 72 07/30/2020 1231    AST 13 08/23/2024 1717    AST 21 07/30/2020 1231    ALT 16 08/23/2024 1717    ALT 43 07/30/2020 1231        Lipid panel 12/10/2023: C 209. T 306. H 35. L 113.   Lipid panel 10/17/2024: C 106. T 189. H 40. L 28.     Cardiac testing:    ECG 10/18/2024:  Sinus rhythm with PAC's. LAD. Lateral infarct. Inferior Infarct. Rate 99 bpm.    Echo 4/25/2024  This is a limited study for LV function assessment.  Left ventricle is normal in size with low normal systolic function.  Basal inferior wall is hypokinetic.  Mild to moderate mitral regurgitation.  No significant difference compared to December 2023 study report     ECG 4/10/2024: Sinus tachycardia. LAD. Inferior infarct. Rate 101 bpm.     ECG 12/21/2023: Normal sinus rhythm. LAD. Inferior infarct. Anterolateral infarct. Rate 68 bpm.     Cardiac catheterization 12/11/2023  s/p thrombolytic therapy for inferior stemi with successful clinical reperfusion. Cath today with no significant obstructive disease requiring PCI. Minimal LM. 30% mid LAD. 50% mid Lcx. 40% mid RCA.     Echo complete 12/10/2023  EF 55%. Hypokinesis of the posterobasal segment of the LV. Mild MR. Trace TR.     ECG 12/9/2023: Normal sinus rhythm with sinus arrhthmia. Inferior-posterior infact with acute inferior infarct.    Review of Systems   Constitutional: Negative.   HENT: Negative.     Cardiovascular:  Negative for chest pain, dyspnea on exertion,  "irregular heartbeat, leg swelling, near-syncope, orthopnea and palpitations.   Respiratory:  Negative for cough and snoring.    Endocrine: Negative.    Skin: Negative.    Musculoskeletal: Negative.    Gastrointestinal: Negative.    Genitourinary: Negative.    Neurological: Negative.    Psychiatric/Behavioral: Negative.         Vitals:    10/18/24 1446   BP: 118/70   Pulse: (!) 108   Temp: 98 °F (36.7 °C)   SpO2: 98%     Vitals:    10/18/24 1446   Weight: 97.6 kg (215 lb 3.2 oz)     Height: 5' 10\" (177.8 cm)   Body mass index is 30.88 kg/m².    Physical Exam  Vitals and nursing note reviewed.   Constitutional:       General: He is not in acute distress.     Appearance: He is well-developed. He is not diaphoretic.   HENT:      Head: Normocephalic and atraumatic.   Neck:      Vascular: No carotid bruit or JVD.   Cardiovascular:      Rate and Rhythm: Normal rate and regular rhythm.      Pulses: Intact distal pulses.      Heart sounds: Normal heart sounds, S1 normal and S2 normal. No murmur heard.     No friction rub. No gallop.   Pulmonary:      Effort: Pulmonary effort is normal. No respiratory distress.      Breath sounds: Normal breath sounds.   Abdominal:      General: There is no distension.      Palpations: Abdomen is soft.      Tenderness: There is no abdominal tenderness.   Skin:     General: Skin is warm and dry.      Findings: No rash.   Neurological:      Mental Status: He is alert and oriented to person, place, and time.   Psychiatric:         Behavior: Behavior normal.                "

## 2024-10-18 NOTE — ASSESSMENT & PLAN NOTE
A. Inferior STEMI 12/9/23.  B. Cardiac cath 12/11/2023: s/p thrombolytic therapy with successful clinical reperfusion. Residual MLI in LM, 30% LAD(m), 50% Lcx(m), 40% RCA(m).  C. Echo 12/10/23 with EF 55% and hypokinesis of the posterobasal wall.  - - - - -  No recurrent chest pain.  DAPT discontinued and placed on Plavix alone in April 2024 due to acute blood loss anemia related to colon ca.  He has been off anti-platelet completely since colon resection in late June. I reviewed with him today the rationale for long-term antiplatelet use in the setting of known CAD with history of thrombosis.  He is willing to stay on aspirin 81 mg daily.  Ok to reduce atorvastatin from 80 mg to 40 mg. Goal LDL < 55.  Discussed urgent s/s to report.

## 2024-10-26 ENCOUNTER — OFFICE VISIT (OUTPATIENT)
Dept: URGENT CARE | Facility: CLINIC | Age: 59
End: 2024-10-26
Payer: COMMERCIAL

## 2024-10-26 ENCOUNTER — HOSPITAL ENCOUNTER (EMERGENCY)
Facility: HOSPITAL | Age: 59
Discharge: HOME | End: 2024-10-26
Attending: EMERGENCY MEDICINE | Admitting: EMERGENCY MEDICINE
Payer: COMMERCIAL

## 2024-10-26 ENCOUNTER — APPOINTMENT (EMERGENCY)
Dept: CT IMAGING | Facility: HOSPITAL | Age: 59
End: 2024-10-26
Payer: COMMERCIAL

## 2024-10-26 VITALS
RESPIRATION RATE: 16 BRPM | BODY MASS INDEX: 30.06 KG/M2 | HEART RATE: 66 BPM | HEIGHT: 70 IN | SYSTOLIC BLOOD PRESSURE: 110 MMHG | DIASTOLIC BLOOD PRESSURE: 70 MMHG | TEMPERATURE: 97.8 F | OXYGEN SATURATION: 97 % | WEIGHT: 210 LBS

## 2024-10-26 VITALS
RESPIRATION RATE: 18 BRPM | TEMPERATURE: 97 F | OXYGEN SATURATION: 98 % | DIASTOLIC BLOOD PRESSURE: 60 MMHG | HEART RATE: 80 BPM | SYSTOLIC BLOOD PRESSURE: 106 MMHG

## 2024-10-26 DIAGNOSIS — R51.9 HEADACHE: Primary | ICD-10-CM

## 2024-10-26 DIAGNOSIS — M54.2 NECK PAIN: ICD-10-CM

## 2024-10-26 DIAGNOSIS — R51.9 ACUTE NONINTRACTABLE HEADACHE, UNSPECIFIED HEADACHE TYPE: Primary | ICD-10-CM

## 2024-10-26 DIAGNOSIS — J06.9 VIRAL URI: ICD-10-CM

## 2024-10-26 LAB
ALBUMIN SERPL BCG-MCNC: 4.1 G/DL (ref 3.5–5)
ALP SERPL-CCNC: 58 U/L (ref 34–104)
ALT SERPL W P-5'-P-CCNC: 15 U/L (ref 7–52)
ANION GAP SERPL CALCULATED.3IONS-SCNC: 9 MMOL/L (ref 4–13)
AST SERPL W P-5'-P-CCNC: 11 U/L (ref 13–39)
BASOPHILS # BLD AUTO: 0.02 THOUSANDS/ΜL (ref 0–0.1)
BASOPHILS NFR BLD AUTO: 0 % (ref 0–1)
BILIRUB SERPL-MCNC: 0.67 MG/DL (ref 0.2–1)
BUN SERPL-MCNC: 37 MG/DL (ref 5–25)
CALCIUM SERPL-MCNC: 9.2 MG/DL (ref 8.4–10.2)
CHLORIDE SERPL-SCNC: 97 MMOL/L (ref 96–108)
CO2 SERPL-SCNC: 28 MMOL/L (ref 21–32)
CREAT SERPL-MCNC: 1.38 MG/DL (ref 0.6–1.3)
EOSINOPHIL # BLD AUTO: 0.11 THOUSAND/ΜL (ref 0–0.61)
EOSINOPHIL NFR BLD AUTO: 1 % (ref 0–6)
ERYTHROCYTE [DISTWIDTH] IN BLOOD BY AUTOMATED COUNT: 18.1 % (ref 11.6–15.1)
ERYTHROCYTE [SEDIMENTATION RATE] IN BLOOD: 57 MM/HOUR (ref 0–19)
FLUAV RNA RESP QL NAA+PROBE: NEGATIVE
FLUBV RNA RESP QL NAA+PROBE: NEGATIVE
GFR SERPL CREATININE-BSD FRML MDRD: 55 ML/MIN/1.73SQ M
GLUCOSE SERPL-MCNC: 232 MG/DL (ref 65–140)
HCT VFR BLD AUTO: 38.7 % (ref 36.5–49.3)
HGB BLD-MCNC: 12.3 G/DL (ref 12–17)
IMM GRANULOCYTES # BLD AUTO: 0.04 THOUSAND/UL (ref 0–0.2)
IMM GRANULOCYTES NFR BLD AUTO: 0 % (ref 0–2)
LYMPHOCYTES # BLD AUTO: 0.49 THOUSANDS/ΜL (ref 0.6–4.47)
LYMPHOCYTES NFR BLD AUTO: 5 % (ref 14–44)
MCH RBC QN AUTO: 24.3 PG (ref 26.8–34.3)
MCHC RBC AUTO-ENTMCNC: 31.8 G/DL (ref 31.4–37.4)
MCV RBC AUTO: 76 FL (ref 82–98)
MONOCYTES # BLD AUTO: 0.77 THOUSAND/ΜL (ref 0.17–1.22)
MONOCYTES NFR BLD AUTO: 8 % (ref 4–12)
NEUTROPHILS # BLD AUTO: 8.33 THOUSANDS/ΜL (ref 1.85–7.62)
NEUTS SEG NFR BLD AUTO: 86 % (ref 43–75)
NRBC BLD AUTO-RTO: 0 /100 WBCS
PLATELET # BLD AUTO: 198 THOUSANDS/UL (ref 149–390)
PMV BLD AUTO: 9.9 FL (ref 8.9–12.7)
POTASSIUM SERPL-SCNC: 4.3 MMOL/L (ref 3.5–5.3)
PROT SERPL-MCNC: 7.5 G/DL (ref 6.4–8.4)
RBC # BLD AUTO: 5.07 MILLION/UL (ref 3.88–5.62)
RSV RNA RESP QL NAA+PROBE: NEGATIVE
SARS-COV-2 RNA RESP QL NAA+PROBE: NEGATIVE
SODIUM SERPL-SCNC: 134 MMOL/L (ref 135–147)
WBC # BLD AUTO: 9.76 THOUSAND/UL (ref 4.31–10.16)

## 2024-10-26 PROCEDURE — 96372 THER/PROPH/DIAG INJ SC/IM: CPT

## 2024-10-26 PROCEDURE — 99285 EMERGENCY DEPT VISIT HI MDM: CPT | Performed by: EMERGENCY MEDICINE

## 2024-10-26 PROCEDURE — 0241U HB NFCT DS VIR RESP RNA 4 TRGT: CPT | Performed by: EMERGENCY MEDICINE

## 2024-10-26 PROCEDURE — G0382 LEV 3 HOSP TYPE B ED VISIT: HCPCS

## 2024-10-26 PROCEDURE — 96375 TX/PRO/DX INJ NEW DRUG ADDON: CPT

## 2024-10-26 PROCEDURE — 85652 RBC SED RATE AUTOMATED: CPT | Performed by: EMERGENCY MEDICINE

## 2024-10-26 PROCEDURE — 96365 THER/PROPH/DIAG IV INF INIT: CPT

## 2024-10-26 PROCEDURE — S9083 URGENT CARE CENTER GLOBAL: HCPCS

## 2024-10-26 PROCEDURE — 80053 COMPREHEN METABOLIC PANEL: CPT | Performed by: EMERGENCY MEDICINE

## 2024-10-26 PROCEDURE — 99284 EMERGENCY DEPT VISIT MOD MDM: CPT

## 2024-10-26 PROCEDURE — 85025 COMPLETE CBC W/AUTO DIFF WBC: CPT | Performed by: EMERGENCY MEDICINE

## 2024-10-26 PROCEDURE — 36415 COLL VENOUS BLD VENIPUNCTURE: CPT | Performed by: EMERGENCY MEDICINE

## 2024-10-26 PROCEDURE — 70450 CT HEAD/BRAIN W/O DYE: CPT

## 2024-10-26 RX ORDER — FENTANYL CITRATE 50 UG/ML
25 INJECTION, SOLUTION INTRAMUSCULAR; INTRAVENOUS ONCE
Status: COMPLETED | OUTPATIENT
Start: 2024-10-26 | End: 2024-10-26

## 2024-10-26 RX ORDER — SUMATRIPTAN 6 MG/.5ML
6 INJECTION, SOLUTION SUBCUTANEOUS ONCE
Status: COMPLETED | OUTPATIENT
Start: 2024-10-26 | End: 2024-10-26

## 2024-10-26 RX ORDER — OXYCODONE AND ACETAMINOPHEN 5; 325 MG/1; MG/1
1 TABLET ORAL EVERY 8 HOURS PRN
Qty: 12 TABLET | Refills: 0 | Status: SHIPPED | OUTPATIENT
Start: 2024-10-26 | End: 2024-10-30

## 2024-10-26 RX ORDER — MAGNESIUM SULFATE HEPTAHYDRATE 40 MG/ML
2 INJECTION, SOLUTION INTRAVENOUS ONCE
Status: COMPLETED | OUTPATIENT
Start: 2024-10-26 | End: 2024-10-26

## 2024-10-26 RX ORDER — DIPHENHYDRAMINE HYDROCHLORIDE 50 MG/ML
25 INJECTION INTRAMUSCULAR; INTRAVENOUS ONCE
Status: COMPLETED | OUTPATIENT
Start: 2024-10-26 | End: 2024-10-26

## 2024-10-26 RX ORDER — METHYLPREDNISOLONE 4 MG/1
TABLET ORAL
Qty: 21 EACH | Refills: 0 | Status: SHIPPED | OUTPATIENT
Start: 2024-10-26

## 2024-10-26 RX ORDER — METOCLOPRAMIDE HYDROCHLORIDE 5 MG/ML
10 INJECTION INTRAMUSCULAR; INTRAVENOUS ONCE
Status: COMPLETED | OUTPATIENT
Start: 2024-10-26 | End: 2024-10-26

## 2024-10-26 RX ORDER — HYDROMORPHONE HCL/PF 1 MG/ML
0.5 SYRINGE (ML) INJECTION ONCE
Status: COMPLETED | OUTPATIENT
Start: 2024-10-26 | End: 2024-10-26

## 2024-10-26 RX ORDER — KETOROLAC TROMETHAMINE 30 MG/ML
30 INJECTION, SOLUTION INTRAMUSCULAR; INTRAVENOUS ONCE
Status: COMPLETED | OUTPATIENT
Start: 2024-10-26 | End: 2024-10-26

## 2024-10-26 RX ADMIN — HYDROMORPHONE HYDROCHLORIDE 0.5 MG: 1 INJECTION, SOLUTION INTRAMUSCULAR; INTRAVENOUS; SUBCUTANEOUS at 19:45

## 2024-10-26 RX ADMIN — METOCLOPRAMIDE 10 MG: 5 INJECTION, SOLUTION INTRAMUSCULAR; INTRAVENOUS at 19:02

## 2024-10-26 RX ADMIN — MAGNESIUM SULFATE HEPTAHYDRATE 2 G: 40 INJECTION, SOLUTION INTRAVENOUS at 18:51

## 2024-10-26 RX ADMIN — SODIUM CHLORIDE 1000 ML: 0.9 INJECTION, SOLUTION INTRAVENOUS at 18:56

## 2024-10-26 RX ADMIN — FENTANYL CITRATE 25 MCG: 50 INJECTION INTRAMUSCULAR; INTRAVENOUS at 18:57

## 2024-10-26 RX ADMIN — DIPHENHYDRAMINE HYDROCHLORIDE 25 MG: 50 INJECTION, SOLUTION INTRAMUSCULAR; INTRAVENOUS at 19:00

## 2024-10-26 RX ADMIN — SUMATRIPTAN 6 MG: 6 INJECTION, SOLUTION SUBCUTANEOUS at 19:02

## 2024-10-26 RX ADMIN — KETOROLAC TROMETHAMINE 30 MG: 30 INJECTION, SOLUTION INTRAMUSCULAR; INTRAVENOUS at 13:17

## 2024-10-26 NOTE — PROGRESS NOTES
[facility]        NAME: Brent Hall is a 59 y.o. male  : 1965    MRN: [id number]  DATE: 2024  TIME: 9:59 PM    Assessment and Plan   Acute nonintractable headache, unspecified headache type [R51.9]  1. Acute nonintractable headache, unspecified headache type  ketorolac (TORADOL) injection 30 mg      2. Viral URI        3. Neck pain  methylPREDNISolone 4 MG tablet therapy pack        Rapid covid: neg    Neuro exam intact with no red flag signs.  Provided IM Toradol in office for neck pain and headache.  Symptoms seem more consistent with viral etiology.  Patient was prescribed naproxen and Zofran yesterday and advised to continue Zofran as needed for GI symptoms. Went over strict instructions on when to proceed ER.  Patient verbalized understanding.    Patient Instructions     Take steroid as prescribed   Wait at least 6 hours after injection to take NSAIDs such as ibuprofen  Plenty of fluids and rest  Take Multivitamin or Magnesium 400 mg and Vitamin B2 400 mg  Can take tylenol/ibuprofen/excedrin migraine for headache  Avoid screens and bright lights   Go to ER if worsening headache, slurred speech, fainting, vision changes, or other worrisome symptoms   Follow up with PCP in 3-5 days.  Proceed to  ER if symptoms worsen.    If tests are performed, our office will contact you with results only if changes need to made to the care plan discussed with you at the visit. [facility]    Chief Complaint     Chief Complaint   Patient presents with    Headache     Headache with nausea, chills, sweats, fevers, slight cough x3 days - advil and naproxen - without relief. Telehealth visit yesterday - no diagnosis given - just given naproxen and zofran         History of Present Illness       URI   This is a new problem. Episode onset: 3 days. Associated symptoms include coughing (slight, dry), headaches, nausea and vomiting. Pertinent negatives include no  chest pain, congestion, ear pain, rhinorrhea, sneezing, sore throat or wheezing. Treatments tried: advil, naproxen, zofran.   He stated he had a telehealth visit yesterday where he was prescribed naproxen and zofran.  He denies any vision changes, photophobia, or phonophobia.  He stated that he does have multiple disc herniations in his neck that correlate with his headaches.  He stated he has had vomiting but has tolerated water, applesauce, tea, and some soup.    Review of Systems   Review of Systems   Constitutional:  Positive for chills, diaphoresis and fever (subjective). Negative for fatigue.   HENT:  Negative for congestion, ear pain, postnasal drip, rhinorrhea, sinus pressure, sneezing, sore throat and tinnitus.    Eyes:  Negative for photophobia, redness and itching.   Respiratory:  Positive for cough (slight, dry). Negative for chest tightness, shortness of breath and wheezing.    Cardiovascular:  Negative for chest pain.   Gastrointestinal:  Positive for nausea and vomiting.   Skin:  Negative for color change and pallor.   Neurological:  Positive for headaches. Negative for dizziness and light-headedness.   Psychiatric/Behavioral:  Negative for confusion.          Current Medications       Current Outpatient Medications:     Alcohol Swabs 70 % PADS, May substitute brand based on insurance coverage. Check glucose ACHS., Disp: 200 each, Rfl: 0    aspirin (ECOTRIN LOW STRENGTH) 81 mg EC tablet, Take 1 tablet (81 mg total) by mouth daily, Disp: 90 tablet, Rfl: 3    atorvastatin (LIPITOR) 40 mg tablet, Take 1 tablet (40 mg total) by mouth daily with dinner, Disp: 90 tablet, Rfl: 3    Blood Glucose Monitoring Suppl (OneTouch Verio Reflect) w/Device KIT, May substitute brand based on insurance coverage. Check glucose ACHS., Disp: 1 kit, Rfl: 0    esomeprazole (NexIUM) 20 mg capsule, Take 20 mg by mouth every morning before breakfast, Disp: , Rfl:     glucose blood (OneTouch Verio) test strip, Check blood sugars  4 times daily, Disp: 360 each, Rfl: 2    Insulin Pen Needle (BD Pen Needle Andreia 2nd Gen) 32G X 4 MM MISC, 4 times daily insulin, Disp: 400 each, Rfl: 1    Insulin Pen Needle (BD Pen Needle Andreia 2nd Gen) 32G X 4 MM MISC, For use with insulin pen. Pharmacy may dispense brand covered by insurance., Disp: 100 each, Rfl: 0    Lantus SoloStar 100 units/mL SOPN, E11.65 inject 12 units daily., Disp: 27 mL, Rfl: 1    metFORMIN (GLUCOPHAGE-XR) 500 mg 24 hr tablet, Take 4 tablets (2,000 mg total) by mouth daily with breakfast, Disp: 400 tablet, Rfl: 1    methylPREDNISolone 4 MG tablet therapy pack, Use as directed on package, Disp: 21 each, Rfl: 0    nitroglycerin (NITROSTAT) 0.4 mg SL tablet, Place 1 tablet (0.4 mg total) under the tongue every 5 (five) minutes as needed for chest pain, Disp: 30 tablet, Rfl: 0    OneTouch Delica Lancets 33G MISC, May substitute brand based on insurance coverage. Check glucose ACHS., Disp: 200 each, Rfl: 0    acetaminophen (TYLENOL) 325 mg tablet, Take 3 tablets (975 mg total) by mouth every 8 (eight) hours (Patient not taking: Reported on 8/12/2024), Disp: , Rfl:     Fiasp FlexTouch 100 units/mL injection pen, E11.65 Inject 3 units with breakfast, 5 units with lunch and dinner or as directed, TDD up to 30 units daily (Patient not taking: Reported on 10/18/2024), Disp: 27 mL, Rfl: 1    oxyCODONE-acetaminophen (Percocet) 5-325 mg per tablet, Take 1 tablet by mouth every 8 (eight) hours as needed for severe pain for up to 4 days Max Daily Amount: 3 tablets, Disp: 12 tablet, Rfl: 0  No current facility-administered medications for this visit.    Current Allergies     Allergies as of 10/26/2024    (No Known Allergies)            The following portions of the patient's history were reviewed and updated as appropriate: allergies, current medications, past family history, past medical history, past social history, past surgical history and problem list.     Past Medical History:   Diagnosis Date     "Anemia     CAD (coronary artery disease)     Diabetes mellitus (HCC)     GERD (gastroesophageal reflux disease)     Hyperlipidemia     Obesity (BMI 30-39.9)        Past Surgical History:   Procedure Laterality Date    CARDIAC CATHETERIZATION Left 12/11/2023    Procedure: Cardiac Left Heart Cath;  Surgeon: Nacho Salinas DO;  Location: [facility];  Service: Cardiology    CARDIAC CATHETERIZATION N/A 12/11/2023    Procedure: Cardiac Coronary Angiogram;  Surgeon: Nacho Salinas DO;  Location: [facility];  Service: Cardiology    CARDIAC CATHETERIZATION  12/11/2023    Procedure: Cardiac catheterization;  Surgeon: Nacho Salinas DO;  Location: [facility];  Service: Cardiology    MN LAPAROSCOPY COLECTOMY PARTIAL W/ANASTOMOSIS N/A 6/24/2024    Procedure: RIGHT ROBOTIC HEMICOLECTOMY LAPAROSCOPIC W/ ROBOTICS;  Surgeon: Azam Carreon MD;  Location: [facility];  Service: Colorectal    ULNAR NERVE REPAIR Left 2018    WRIST ARTHRODESIS Bilateral     2019, 2014       Family History   Problem Relation Age of Onset    Diabetes Mother     Diabetes Father     Valvular heart disease Father     Atrial fibrillation Father     Diabetes Sister     Lung disease Sister     Heart failure Sister          Medications have been verified.        Objective   /70   Pulse 66   Temp 97.8 °F (36.6 °C)   Resp 16   Ht 5' 10\" (1.778 m)   Wt 95.3 kg (210 lb)   SpO2 97%   BMI 30.13 kg/m²        Physical Exam     Physical Exam  Constitutional:       Appearance: Normal appearance.   HENT:      Head: Normocephalic.      Right Ear: Tympanic membrane and external ear normal.      Left Ear: Tympanic membrane and external ear normal.      Mouth/Throat:      Mouth: Mucous membranes are moist.      Pharynx: Oropharynx is clear.   Eyes:      Extraocular Movements: Extraocular movements intact.      Conjunctiva/sclera: Conjunctivae normal.      Pupils: Pupils are equal, round, and reactive to light. "   Cardiovascular:      Rate and Rhythm: Normal rate and regular rhythm.      Pulses: Normal pulses.      Heart sounds: Normal heart sounds.   Pulmonary:      Effort: Pulmonary effort is normal. No respiratory distress.      Breath sounds: Normal breath sounds. No stridor. No wheezing, rhonchi or rales.   Musculoskeletal:      Cervical back: No tenderness.   Lymphadenopathy:      Cervical: No cervical adenopathy.   Skin:     General: Skin is warm and dry.   Neurological:      General: No focal deficit present.      Mental Status: He is alert and oriented to person, place, and time. Mental status is at baseline.   Psychiatric:         Mood and Affect: Mood normal.         Behavior: Behavior normal.         Thought Content: Thought content normal.         Judgment: Judgment normal.

## 2024-10-26 NOTE — PATIENT INSTRUCTIONS
Take steroid as prescribed   Wait at least 6 hours after injection to take NSAIDs such as ibuprofen  Plenty of fluids and rest  Take Multivitamin or Magnesium 400 mg and Vitamin B2 400 mg  Can take tylenol/ibuprofen/excedrin migraine for headache  Avoid screens and bright lights   Go to ER if worsening headache, slurred speech, fainting, vision changes, or other worrisome symptoms   Follow up with PCP in 3-5 days.  Proceed to  ER if symptoms worsen.    If tests are performed, our office will contact you with results only if changes need to made to the care plan discussed with you at the visit. You can review your full results on [facility].

## 2024-10-27 NOTE — ED PROVIDER NOTES
Time reflects when diagnosis was documented in both MDM as applicable and the Disposition within this note       Time User Action Codes Description Comment    10/26/2024  8:58 PM Tara Vázquez Add [R51.9] Headache           ED Disposition       ED Disposition   Discharge    Condition   Stable    Date/Time   Sat Oct 26, 2024  8:58 PM    Comment   Brent Clarkust discharge to home/self care.                   Assessment & Plan       Medical Decision Making  Differential diagnosis includes but not limited to: Migraine headache, cluster headache, intracranial bleed, subarachnoid, neoplasm    Amount and/or Complexity of Data Reviewed  Labs: ordered.  Radiology: ordered.    Risk  Prescription drug management.        ED Course as of 10/26/24 2101   Sat Oct 26, 2024   2101 Patient states he feels improved       Medications   metoclopramide (REGLAN) injection 10 mg (10 mg Intravenous Given 10/26/24 1902)   diphenhydrAMINE (BENADRYL) injection 25 mg (25 mg Intravenous Given 10/26/24 1900)   SUMAtriptan (IMITREX) subcutaneous injection 6 mg (6 mg Subcutaneous Given 10/26/24 1902)   magnesium sulfate 2 g/50 mL IVPB (premix) 2 g (0 g Intravenous Stopped 10/26/24 1951)   sodium chloride 0.9 % bolus 1,000 mL (0 mL Intravenous Stopped 10/26/24 1948)   fentaNYL injection 25 mcg (25 mcg Intravenous Given 10/26/24 1857)   HYDROmorphone (DILAUDID) injection 0.5 mg (0.5 mg Intravenous Given 10/26/24 1945)       ED Risk Strat Scores                           SBIRT 20yo+      Flowsheet Row Most Recent Value   Initial Alcohol Screen: US AUDIT-C     1. How often do you have a drink containing alcohol? 0 Filed at: 10/26/2024 1757   2. How many drinks containing alcohol do you have on a typical day you are drinking?  0 Filed at: 10/26/2024 1757   3a. Male UNDER 65: How often do you have five or more drinks on one occasion? 0 Filed at: 10/26/2024 1757   Audit-C Score 0 Filed at: 10/26/2024 1757   SUMMER: How many times in the past year have you...     Used an illegal drug or used a prescription medication for non-medical reasons? Never Filed at: 10/26/2024 9106                            History of Present Illness       Chief Complaint   Patient presents with    Headache     Pt states headache that radiates into his neck for the past 3 days. States he went to urgent care today but had no relief with the medication. No other neuro symptoms       Past Medical History:   Diagnosis Date    Anemia     CAD (coronary artery disease)     Diabetes mellitus (HCC)     GERD (gastroesophageal reflux disease)     Hyperlipidemia     Obesity (BMI 30-39.9)       Past Surgical History:   Procedure Laterality Date    CARDIAC CATHETERIZATION Left 2023    Procedure: Cardiac Left Heart Cath;  Surgeon: Nacho Salinas DO;  [facility]: [facility];  Service: Cardiology    CARDIAC CATHETERIZATION N/A 2023    Procedure: Cardiac Coronary Angiogram;  Surgeon: Nacho Salinas DO;  Location: [facility];  Service: Cardiology    CARDIAC CATHETERIZATION  2023    Procedure: Cardiac catheterization;  Surgeon: Nacho Salinas DO;  Location: [facility];  Service: Cardiology    CA LAPAROSCOPY COLECTOMY PARTIAL W/ANASTOMOSIS N/A 2024    Procedure: RIGHT ROBOTIC HEMICOLECTOMY LAPAROSCOPIC W/ ROBOTICS;  Surgeon: Azam Carreon MD;  Location: [facility];  Service: Colorectal    ULNAR NERVE REPAIR Left     WRIST ARTHRODESIS Bilateral     2014      Family History   Problem Relation Age of Onset    Diabetes Mother     Diabetes Father     Valvular heart disease Father     Atrial fibrillation Father     Diabetes Sister     Lung disease Sister     Heart failure Sister       Social History     Tobacco Use    Smoking status: Former     Current packs/day: 0.00     Types: Cigarettes     Quit date: 2010     Years since quittin.8     Passive exposure: Never    Smokeless tobacco: Never   Vaping Use    Vaping status: Never Used    Substance Use Topics    Alcohol use: Not Currently     Comment: very rare    Drug use: Never      E-Cigarette/Vaping    E-Cigarette Use Never User       E-Cigarette/Vaping Substances    Nicotine No     THC No     CBD No     Flavoring No     Other No     Unknown No       I have reviewed and agree with the history as documented.     This is a 59-year-old male presenting to the ED for evaluation of headache that has been present for 3 days radiating into his neck.  Patient states that he went to urgent care today but has no relief from his symptoms.  Patient states the pain is sharp, constant, 8 out of 10 and radiates to his entire head.  Denies any fever chills or midline neck tenderness.  Patient states that he was nauseous and had some episodes of vomiting yesterday but not today.        Review of Systems   Constitutional:  Negative for chills and fever.   HENT:  Negative for ear pain and sore throat.    Eyes:  Negative for pain and visual disturbance.   Respiratory:  Negative for cough and shortness of breath.    Cardiovascular:  Negative for chest pain and palpitations.   Gastrointestinal:  Positive for nausea and vomiting. Negative for abdominal pain.   Genitourinary:  Negative for dysuria and hematuria.   Musculoskeletal:  Negative for arthralgias and back pain.   Skin:  Negative for color change and rash.   Neurological:  Positive for headaches. Negative for seizures and syncope.   All other systems reviewed and are negative.          Objective       ED Triage Vitals   Temperature Pulse Blood Pressure Respirations SpO2 Patient Position - Orthostatic VS   10/26/24 1755 10/26/24 1755 10/26/24 1755 10/26/24 1755 10/26/24 1755 --   (!) 97 °F (36.1 °C) 72 135/69 18 100 %       Temp Source Heart Rate Source BP Location FiO2 (%) Pain Score    10/26/24 1755 10/26/24 1800 -- -- 10/26/24 1755    Temporal Monitor   8      Vitals      Date and Time Temp Pulse SpO2 Resp BP Pain Score FACES Pain Rating User   10/26/24 2045  -- 80 98 % 18 106/60 -- -- SB   10/26/24 2030 -- 72 95 % 18 111/57 -- -- SB   10/26/24 1945 -- 74 99 % 18 123/69 5 -- JM   10/26/24 1943 -- -- -- -- -- 5 -- JM   10/26/24 1856 -- -- -- -- -- 8 -- SB   10/26/24 1830 -- 74 99 % 18 129/74 -- -- SB   10/26/24 1810 -- -- -- -- -- 8 -- SB   10/26/24 1800 -- 65 97 % 18 127/73 -- -- SB   10/26/24 1755 97 °F (36.1 °C) 72 100 % 18 135/69 8 -- MD            Physical Exam  Vitals and nursing note reviewed.   Constitutional:       General: He is in acute distress.      Appearance: Normal appearance. He is well-developed and normal weight. He is not ill-appearing.   HENT:      Head: Normocephalic and atraumatic.      Right Ear: External ear normal.      Left Ear: External ear normal.      Nose: Nose normal. No rhinorrhea.      Mouth/Throat:      Mouth: Mucous membranes are moist.   Eyes:      Extraocular Movements: Extraocular movements intact.      Conjunctiva/sclera: Conjunctivae normal.   Cardiovascular:      Rate and Rhythm: Normal rate and regular rhythm.      Pulses: Normal pulses.      Heart sounds: Normal heart sounds. No murmur heard.  Pulmonary:      Effort: Pulmonary effort is normal. No respiratory distress.      Breath sounds: Normal breath sounds. No stridor.   Abdominal:      General: Abdomen is flat. Bowel sounds are normal. There is no distension.      Palpations: Abdomen is soft. There is no mass.      Tenderness: There is no abdominal tenderness.   Musculoskeletal:         General: No swelling, tenderness, deformity or signs of injury. Normal range of motion.      Cervical back: Normal range of motion and neck supple. No rigidity or tenderness.      Right lower leg: No edema.      Left lower leg: No edema.   Skin:     General: Skin is warm and dry.      Capillary Refill: Capillary refill takes less than 2 seconds.      Coloration: Skin is not jaundiced or pale.      Findings: No bruising, erythema, lesion or rash.   Neurological:      General: No focal deficit  present.      Mental Status: He is alert and oriented to person, place, and time. Mental status is at baseline.      Cranial Nerves: No cranial nerve deficit.      Sensory: No sensory deficit.      Motor: No weakness.      Coordination: Coordination normal.      Gait: Gait normal.      Deep Tendon Reflexes: Reflexes normal.   Psychiatric:         Mood and Affect: Mood normal.         Results Reviewed       Procedure Component Value Units Date/Time    COVID/FLU/RSV [[id number]]  (Normal) Collected: 10/26/24 2003    Lab Status: Final result Specimen: Nares from Nose Updated: 10/26/24 2044     SARS-CoV-2 Negative     INFLUENZA A PCR Negative     INFLUENZA B PCR Negative     RSV PCR Negative    Narrative:      This test has been performed using the CoV-2/Flu/RSV plus assay on the "[employer] platform. This test has been validated by the  and verified by the performing laboratory.     This test is designed to amplify and detect the following: nucleocapsid (N), envelope (E), and RNA-dependent RNA polymerase (RdRP) genes of the SARS-CoV-2 genome; matrix (M), basic polymerase (PB2), and acidic protein (PA) segments of the influenza A genome; matrix (M) and non-structural protein (NS) segments of the influenza B genome, and the nucleocapsid genes of RSV A and RSV B.     Positive results are indicative of the presence of Flu A, Flu B, RSV, and/or SARS-CoV-2 RNA. Positive results for SARS-CoV-2 or suspected novel influenza should be reported to state, local, or federal health departments according to local reporting requirements.      All results should be assessed in conjunction with clinical presentation and other laboratory markers for clinical management.     FOR PEDIATRIC PATIENTS - copy/paste COVID Guidelines URL to browser: [email or web address]       Comprehensive metabolic panel [[id number]]  (Abnormal) Collected: 10/26/24 1911    Lab Status: Final  result Specimen: Blood from Arm, Right Updated: 10/26/24 1935     Sodium 134 mmol/L      Potassium 4.3 mmol/L      Chloride 97 mmol/L      CO2 28 mmol/L      ANION GAP 9 mmol/L      BUN 37 mg/dL      Creatinine 1.38 mg/dL      Glucose 232 mg/dL      Calcium 9.2 mg/dL      AST 11 U/L      ALT 15 U/L      Alkaline Phosphatase 58 U/L      Total Protein 7.5 g/dL      Albumin 4.1 g/dL      Total Bilirubin 0.67 mg/dL      eGFR 55 ml/min/1.73sq m     Narrative:      National Kidney Disease Foundation guidelines for Chronic Kidney Disease (CKD):     Stage 1 with normal or high GFR (GFR > 90 mL/min/1.73 square meters)    Stage 2 Mild CKD (GFR = 60-89 mL/min/1.73 square meters)    Stage 3A Moderate CKD (GFR = 45-59 mL/min/1.73 square meters)    Stage 3B Moderate CKD (GFR = 30-44 mL/min/1.73 square meters)    Stage 4 Severe CKD (GFR = 15-29 mL/min/1.73 square meters)    Stage 5 End Stage CKD (GFR <15 mL/min/1.73 square meters)  Note: GFR calculation is accurate only with a steady state creatinine    Sedimentation rate, automated [[id number]]  (Abnormal) Collected: 10/26/24 1911    Lab Status: Final result Specimen: Blood from Arm, Right Updated: 10/26/24 1918     Sed Rate 57 mm/hour     CBC and differential [[id number]]  (Abnormal) Collected: 10/26/24 1911    Lab Status: Final result Specimen: Blood from Arm, Right Updated: 10/26/24 1916     WBC 9.76 Thousand/uL      RBC 5.07 Million/uL      Hemoglobin 12.3 g/dL      Hematocrit 38.7 %      MCV 76 fL      MCH 24.3 pg      MCHC 31.8 g/dL      RDW 18.1 %      MPV 9.9 fL      Platelets 198 Thousands/uL      nRBC 0 /100 WBCs      Segmented % 86 %      Immature Grans % 0 %      Lymphocytes % 5 %      Monocytes % 8 %      Eosinophils Relative 1 %      Basophils Relative 0 %      Absolute Neutrophils 8.33 Thousands/µL      Absolute Immature Grans 0.04 Thousand/uL      Absolute Lymphocytes 0.49 Thousands/µL      Absolute Monocytes 0.77 Thousand/µL      Eosinophils Absolute 0.11  Thousand/µL      Basophils Absolute 0.02 Thousands/µL             CT head wo contrast   Final Interpretation by Kirt Valladares MD (10/26 1908)      No acute intracranial abnormality.                  Workstation performed: [id number]             Procedures    ED Medication and Procedure Management   Prior to Admission Medications   Prescriptions Last Dose Informant Patient Reported? Taking?   Alcohol Swabs 70 % PADS  Self No No   Sig: May substitute brand based on insurance coverage. Check glucose ACHS.   Blood Glucose Monitoring Suppl (OneTouch Verio Reflect) w/Device KIT  Self No No   Sig: May substitute brand based on insurance coverage. Check glucose ACHS.   Fiasp FlexTouch 100 units/mL injection pen  Self No No   Sig: E11.65 Inject 3 units with breakfast, 5 units with lunch and dinner or as directed, TDD up to 30 units daily   Patient not taking: Reported on 10/18/2024   Insulin Pen Needle (BD Pen Needle Andreia 2nd Gen) 32G X 4 MM MISC  Self No No   Si times daily insulin   Insulin Pen Needle (BD Pen Needle Andreia 2nd Gen) 32G X 4 MM MISC  Self No No   Sig: For use with insulin pen. Pharmacy may dispense brand covered by insurance.   Lantus SoloStar 100 units/mL SOPN   No No   Sig: E11.65 inject 12 units daily.   OneTouch Delica Lancets 33G MISC  Self No No   Sig: May substitute brand based on insurance coverage. Check glucose ACHS.   acetaminophen (TYLENOL) 325 mg tablet  Self No No   Sig: Take 3 tablets (975 mg total) by mouth every 8 (eight) hours   Patient not taking: Reported on 2024   aspirin (ECOTRIN LOW STRENGTH) 81 mg EC tablet  Self No No   Sig: Take 1 tablet (81 mg total) by mouth daily   atorvastatin (LIPITOR) 40 mg tablet   No No   Sig: Take 1 tablet (40 mg total) by mouth daily with dinner   esomeprazole (NexIUM) 20 mg capsule  Self Yes No   Sig: Take 20 mg by mouth every morning before breakfast   glucose blood (OneTouch Verio) test strip  Self No No   Sig: Check blood sugars 4 times daily    metFORMIN (GLUCOPHAGE-XR) 500 mg 24 hr tablet   No No   Sig: Take 4 tablets (2,000 mg total) by mouth daily with breakfast   methylPREDNISolone 4 MG tablet therapy pack   No No   Sig: Use as directed on package   nitroglycerin (NITROSTAT) 0.4 mg SL tablet  Self No No   Sig: Place 1 tablet (0.4 mg total) under the tongue every 5 (five) minutes as needed for chest pain      Facility-Administered Medications Last Administration Doses Remaining   ketorolac (TORADOL) injection 30 mg 10/26/2024  1:17 PM 0        Patient's Medications   Discharge Prescriptions    OXYCODONE-ACETAMINOPHEN (PERCOCET) 5-325 MG PER TABLET    Take 1 tablet by mouth every 8 (eight) hours as needed for severe pain for up to 4 days Max Daily Amount: 3 tablets       Start Date: 10/26/2024End Date: 10/30/2024       Order Dose: 1 tablet       Quantity: 12 tablet    Refills: 0       ED SEPSIS DOCUMENTATION   Time reflects when diagnosis was documented in both MDM as applicable and the Disposition within this note       Time User Action Codes Description Comment    10/26/2024  8:58 PM Tara Vázquez Add [R51.9] Headache                  Tara Vázquez DO  10/26/24 2101

## 2024-10-29 ENCOUNTER — PATIENT OUTREACH (OUTPATIENT)
Dept: HEMATOLOGY ONCOLOGY | Facility: CLINIC | Age: 59
End: 2024-10-29

## 2024-10-31 LAB — SCAN RESULT: NORMAL

## 2024-11-15 ENCOUNTER — TELEPHONE (OUTPATIENT)
Dept: HEMATOLOGY ONCOLOGY | Facility: CLINIC | Age: 59
End: 2024-11-15

## 2024-11-15 NOTE — TELEPHONE ENCOUNTER
Left message for patient in regards to obtaining labwork prior to upcoming appointment with Dr. Leyva on 11/20.  Advised patient labs are non fasting and in system.  Advised patient to call office back if they are unable to obtain labwork prior to appointment.  [employer] number provided.

## 2024-11-20 ENCOUNTER — PATIENT OUTREACH (OUTPATIENT)
Dept: HEMATOLOGY ONCOLOGY | Facility: CLINIC | Age: 59
End: 2024-11-20

## 2024-11-20 ENCOUNTER — TELEPHONE (OUTPATIENT)
Dept: HEMATOLOGY ONCOLOGY | Facility: CLINIC | Age: 59
End: 2024-11-20

## 2024-11-20 ENCOUNTER — PATIENT OUTREACH (OUTPATIENT)
Dept: CASE MANAGEMENT | Facility: OTHER | Age: 59
End: 2024-11-20

## 2024-11-20 ENCOUNTER — DOCUMENTATION (OUTPATIENT)
Dept: HEMATOLOGY ONCOLOGY | Facility: CLINIC | Age: 59
End: 2024-11-20

## 2024-11-20 DIAGNOSIS — C18.2 MALIGNANT NEOPLASM OF ASCENDING COLON (HCC): Primary | ICD-10-CM

## 2024-11-20 NOTE — TELEPHONE ENCOUNTER
Oncology Finance Advocacy Intake and Intervention  Oncology Finance Counselor/Advocate placed call to patient. This writer informed patient that this writer is here to assist patient with billing questions, financial assistance, payment/payment plans, quotes, copayment assistance, insurance optimization, and insurance navigation.    This writer conducted a thorough benefit review of copayment, deductible, and out of pocket cost. This information is documented below and has been reviewed with patient.     Self-Pay Balance: $3,281.58  Copayment:$40  Deductible:$750/$0 Remaining MET  Out of Pocket Cost:$7,500/$6,900.14 Remaining   Patient assistance status:CC Viable    Interventions:    Writer left  for PT on 11/20/24 @ 10:45 AM to open dialogue regarding current financial status and any stressors he may be currently facing. Writer strongly encouraged a call back and provided all contact information for the PT to do so. Writer will set reminder to try 2nd outreach attempt on 11/22/24.      Information above was review thoroughly with patient and patient was advise of possible assistance programs/interventions. If any question arise patient can contact this writer at below information. This information was given to patient at time of contact.      Khoi Monge  Phone:[phone]  Email:[email or web address]

## 2024-11-20 NOTE — PROGRESS NOTES
In basket received from medical oncology. Pt needs financial assistance for . Referral placed to social work

## 2024-11-20 NOTE — PROGRESS NOTES
"Patient had appointment in the office today and that has been rescheduled for insurance purposes.  I was looking at my note of 9/16/2024 when I discussed Ct DNA report with him and it was the patient who said \"no chemo\".  That was his decision of no chemo.  "

## 2024-11-20 NOTE — TELEPHONE ENCOUNTER
PT spouse (Destiny) called writer on 11/20/24 to touch base regarding the PT current financial situation and concerns. Destiny informed writer that the PT has been out of work and is currently in process of trying to obtain unemployment/disability. Destiny  voiced extreme concern regarding the PT hospital balance and continual medical expenses that keep compiling. Writer suggested FA may be an option for the PT should he wish to pursue, which if approved, would help diminish the current hospital account balance while providing some coverage gap support. Destiny verbalized understanding and wished to pursue. Writer emailed Destiny the [facility] application per request and provided all of his contact information for future use.          Khoi Monge  Phone:[phone]  Email:[email or web address]

## 2024-11-21 ENCOUNTER — PATIENT OUTREACH (OUTPATIENT)
Dept: CASE MANAGEMENT | Facility: OTHER | Age: 59
End: 2024-11-21

## 2024-11-21 NOTE — PROGRESS NOTES
OSW placed call to number listed, spouse answered stating that it was her work line. OSW introduced self, purpose of call. Spouse will have patient call OSW from another line.

## 2024-11-21 NOTE — PROGRESS NOTES
OSW received referral for patient  Received a message from medical oncology, pt lost his job because he can't afford      OSW will outreach to assess/address needs.

## 2024-11-26 ENCOUNTER — DOCUMENTATION (OUTPATIENT)
Dept: HEMATOLOGY ONCOLOGY | Facility: CLINIC | Age: 59
End: 2024-11-26

## 2024-11-26 ENCOUNTER — TELEPHONE (OUTPATIENT)
Dept: HEMATOLOGY ONCOLOGY | Facility: CLINIC | Age: 59
End: 2024-11-26

## 2024-11-26 NOTE — PROGRESS NOTES
Writer received supporting documents from PT spouse (Destiny) on 11/26/24 via email to add to PT shay care application. Writer confirmed reception of PT documents to Destiny. Writer will look to reconnect with Destiny regarding the remaining application pieces to complete before submission. Destiny was provided with all contact information for writer.            Khoi Monge  Phone:[phone]  Email:[email or web address]

## 2024-11-26 NOTE — TELEPHONE ENCOUNTER
PT spouse (Destiny) called writer on 11/26/24 per writers request to touch base on the [facility] application for PT. Writer confirmed to Destiny that he received the bank statements she emailed. Writer informed Destiny of what other supporting documents will still be needed and emailed her the updated [facility] application for the PT for her to complete. Writer provider all contact information to Destiny future use and encouraged her to reach out if she had any questions with the application.              Khoi Monge  Phone:[phone]  Email:[email or web address]

## 2024-12-03 ENCOUNTER — DOCUMENTATION (OUTPATIENT)
Dept: HEMATOLOGY ONCOLOGY | Facility: CLINIC | Age: 59
End: 2024-12-03

## 2024-12-03 ENCOUNTER — PATIENT OUTREACH (OUTPATIENT)
Dept: HEMATOLOGY ONCOLOGY | Facility: CLINIC | Age: 59
End: 2024-12-03

## 2024-12-03 NOTE — PROGRESS NOTES
Writer received all necessary supporting documents from PT spouse (Destiny) on 12/3/24 to submit PT FA application. Writer submitted PT FA application to [facility] team via email on 12/3/24 @ 12:22 PM and informed Destiny of submission as well. Writer to set reminder for 31 days from today to check on status of PT application.              Khoi Monge  Phone:[phone]  Email:[email or web address]

## 2024-12-03 NOTE — PROGRESS NOTES
Called pt to check in and see how he's doing, no answer, left a detailed message with my contact information

## 2024-12-06 NOTE — PROGRESS NOTES
Assessment/Plan:      Diagnoses and all orders for this visit:    Encounter for initial prescription of contraceptive pills  -     Norethin-Eth Estrad-Fe Biphas (Lo Loestrin Fe) 1 MG-10 MCG / 10 MCG TABS; Take 1 pill PO daily at same time every day     28-year-old female new patient presenting today for discussion of birth control.  She is currently taking triphasic Ortho Tri-Cyclen and overall doing well however she desires something with a very low dose of estrogen and she does report some possible high estrogen type symptoms but otherwise is doing well.  She has no menstrual issues and is sexually active.  Menstruation is regular monthly, average bleeding lasting about 5 days.  She denies any other medical problems and does not take any other prescription medications.  She does note history of mild cervical dysplasia but had a normal Pap smear in 2022.    Patient desires to stay on a birth control pill and would not like any other delivery method at this time.  Other option for combination oral contraceptive reviewed.  Patient desired to try lo Loestrin 1/10.  Risk versus benefit, potential side effect, expected bleeding pattern all reviewed with patient today.  She is currently on week of placebo in her current pack and will transition to first pill and new pack when she is finished.  Patient encouraged to take each pill at the same time every day and avoid missing pills.  She is aware of possibility for some abnormal bleeding or spotting.  She is also aware of possibility for amenorrhea with low Loestrin.    We will plan follow-up in the office in about 3 to 4 months for birth control med check and also incorporate her annual exam and cervical cancer screening at that time.  She was encouraged to reach out sooner if any problems arise in the interim.    Chief Complaint   Patient presents with    Consult     LISA martin. Pt has no issue.        Subjective:     Patient ID: Malu Cancino is a 28 y.o.  I reached out and spoke with Brent  now that consults have been completed with the oncology teams to review for any barriers to care and offer supportive services as needed. Distress Thermometer completed at this time. Patient scored 3/10. Based on responses to DT, no indication for referral to SW needed at this time.  I reviewed and updated the members assigned to the care team in [facility].   He knows the members of the care team as well as how and when to contact them with any needs.   He verbalizes managing the schedules well.   He is currently able to drive and denies any transportation needs.    He denies any uncontrolled symptoms. Discussed role of Palliative Care in symptom and side effect management. Declined referral at this time.  Patients states that he is eating and drinking as per usual with no unintentional weight loss.     Patient does not smoke.   Patient states he is well supported by family and friends.  Community support groups discussed including the [facility] Support [facility] of the [facility]. Patient declined information at this time.   Patient feels he has adequate insurance coverage and denies any financial concerns at this time.     Based on individual needs I will follow up ad needed   I have provided my direct contact information and welcome them to contact me if their needs as discussed above change. They were appreciative for the call.    female.    28-year-old female new patient presenting today for birth control discussion.  Patient takes triphasic combination oral contraceptive previously prescribed by [facility].  She states that she is currently on the placebo week and is due for her period any day.  She would like to try something with low estrogen as she states that she feels fatigued and high estrogen type symptoms and would like something much lower.  Otherwise she reports menstruation is regular once a month during placebo week lasting about 4 to 5 days, average bleeding with minimal cramping.  However the week before she gets moodiness and headaches mild PMS type symptoms.    Last Pap in care everywhere 2022 was normal.  She states she does have a history of mild dysplasia but did have a yeast infection as well.    She is asymptomatic denying any vaginal discharge, itching or odor, no urinary symptoms or pelvic issues.    She is otherwise healthy and denies any active medical problems and otherwise does not take any other medications aside from OCP.        Review of Systems   Constitutional:  Positive for fatigue.   Respiratory: Negative.     Cardiovascular: Negative.    Gastrointestinal: Negative.    Genitourinary: Negative.          The following portions of the patient's history were reviewed and updated as appropriate: allergies, current medications, past family history, past medical history, past social history, past surgical history, and problem list.      Objective:     Physical Exam  Vitals reviewed.   Constitutional:       Appearance: Normal appearance. She is not ill-appearing.   Cardiovascular:      Rate and Rhythm: Normal rate and regular rhythm.      Heart sounds: Normal heart sounds.   Pulmonary:      Effort: Pulmonary effort is normal.      Breath sounds: Normal breath sounds.   Abdominal:      General: Abdomen is flat.      Palpations: Abdomen is soft.      Tenderness: There is no abdominal tenderness. There is no  guarding.   Genitourinary:     Comments: Exam deferred - not medically necessary  Musculoskeletal:      Cervical back: Neck supple.   Neurological:      Mental Status: She is alert and oriented to person, place, and time.   Psychiatric:         Mood and Affect: Mood normal.         Behavior: Behavior normal.

## 2024-12-11 ENCOUNTER — PATIENT OUTREACH (OUTPATIENT)
Dept: CASE MANAGEMENT | Facility: OTHER | Age: 59
End: 2024-12-11

## 2024-12-13 ENCOUNTER — PATIENT OUTREACH (OUTPATIENT)
Dept: CASE MANAGEMENT | Facility: OTHER | Age: 59
End: 2024-12-13

## 2024-12-18 ENCOUNTER — PATIENT OUTREACH (OUTPATIENT)
Dept: CASE MANAGEMENT | Facility: OTHER | Age: 59
End: 2024-12-18

## 2025-01-06 ENCOUNTER — DOCUMENTATION (OUTPATIENT)
Dept: HEMATOLOGY ONCOLOGY | Facility: CLINIC | Age: 60
End: 2025-01-06

## 2025-01-06 NOTE — PROGRESS NOTES
PT spouse (Destiny) emailed writer on 1/6/25 to ask if the writer you assist in obtaining an update regarding the PT [facility] application. Writer emailed [facility] team on 1/6/25 @ 2:15 PM ,with Destiny attached, to inquire if an update can be provided for the PT. Writer provided all contact information to Destiny for future use.            Khoi Monge  Phone:[phone]  Email:[email or web address]

## 2025-01-14 ENCOUNTER — APPOINTMENT (OUTPATIENT)
Dept: LAB | Facility: HOSPITAL | Age: 60
End: 2025-01-14
Payer: COMMERCIAL

## 2025-01-14 DIAGNOSIS — E11.9 TYPE 2 DIABETES MELLITUS WITHOUT COMPLICATION, WITH LONG-TERM CURRENT USE OF INSULIN (HCC): ICD-10-CM

## 2025-01-14 DIAGNOSIS — Z79.4 TYPE 2 DIABETES MELLITUS WITHOUT COMPLICATION, WITH LONG-TERM CURRENT USE OF INSULIN (HCC): ICD-10-CM

## 2025-01-14 DIAGNOSIS — C18.2 MALIGNANT NEOPLASM OF ASCENDING COLON (HCC): ICD-10-CM

## 2025-01-14 LAB
ANION GAP SERPL CALCULATED.3IONS-SCNC: 5 MMOL/L (ref 4–13)
BASOPHILS # BLD AUTO: 0.03 THOUSANDS/ΜL (ref 0–0.1)
BASOPHILS NFR BLD AUTO: 1 % (ref 0–1)
BUN SERPL-MCNC: 17 MG/DL (ref 5–25)
CALCIUM SERPL-MCNC: 9.5 MG/DL (ref 8.4–10.2)
CHLORIDE SERPL-SCNC: 106 MMOL/L (ref 96–108)
CHOLEST SERPL-MCNC: 114 MG/DL (ref ?–200)
CO2 SERPL-SCNC: 28 MMOL/L (ref 21–32)
CREAT SERPL-MCNC: 1.08 MG/DL (ref 0.6–1.3)
CREAT UR-MCNC: 147.4 MG/DL
EOSINOPHIL # BLD AUTO: 0.08 THOUSAND/ΜL (ref 0–0.61)
EOSINOPHIL NFR BLD AUTO: 2 % (ref 0–6)
ERYTHROCYTE [DISTWIDTH] IN BLOOD BY AUTOMATED COUNT: 15.5 % (ref 11.6–15.1)
EST. AVERAGE GLUCOSE BLD GHB EST-MCNC: 183 MG/DL
GFR SERPL CREATININE-BSD FRML MDRD: 74 ML/MIN/1.73SQ M
GLUCOSE P FAST SERPL-MCNC: 169 MG/DL (ref 65–99)
HBA1C MFR BLD: 8 %
HCT VFR BLD AUTO: 41.1 % (ref 36.5–49.3)
HDLC SERPL-MCNC: 39 MG/DL
HGB BLD-MCNC: 13.2 G/DL (ref 12–17)
IMM GRANULOCYTES # BLD AUTO: 0.02 THOUSAND/UL (ref 0–0.2)
IMM GRANULOCYTES NFR BLD AUTO: 0 % (ref 0–2)
LDLC SERPL CALC-MCNC: 56 MG/DL (ref 0–100)
LYMPHOCYTES # BLD AUTO: 1.18 THOUSANDS/ΜL (ref 0.6–4.47)
LYMPHOCYTES NFR BLD AUTO: 24 % (ref 14–44)
MCH RBC QN AUTO: 25.2 PG (ref 26.8–34.3)
MCHC RBC AUTO-ENTMCNC: 32.1 G/DL (ref 31.4–37.4)
MCV RBC AUTO: 79 FL (ref 82–98)
MICROALBUMIN UR-MCNC: 19.9 MG/L
MICROALBUMIN/CREAT 24H UR: 14 MG/G CREATININE (ref 0–30)
MONOCYTES # BLD AUTO: 0.53 THOUSAND/ΜL (ref 0.17–1.22)
MONOCYTES NFR BLD AUTO: 11 % (ref 4–12)
NEUTROPHILS # BLD AUTO: 3.19 THOUSANDS/ΜL (ref 1.85–7.62)
NEUTS SEG NFR BLD AUTO: 62 % (ref 43–75)
NRBC BLD AUTO-RTO: 0 /100 WBCS
PLATELET # BLD AUTO: 194 THOUSANDS/UL (ref 149–390)
PMV BLD AUTO: 9.9 FL (ref 8.9–12.7)
POTASSIUM SERPL-SCNC: 4.7 MMOL/L (ref 3.5–5.3)
RBC # BLD AUTO: 5.23 MILLION/UL (ref 3.88–5.62)
SODIUM SERPL-SCNC: 139 MMOL/L (ref 135–147)
TRIGL SERPL-MCNC: 95 MG/DL (ref ?–150)
WBC # BLD AUTO: 5.03 THOUSAND/UL (ref 4.31–10.16)

## 2025-01-14 PROCEDURE — 80061 LIPID PANEL: CPT

## 2025-01-14 PROCEDURE — 83036 HEMOGLOBIN GLYCOSYLATED A1C: CPT

## 2025-01-14 PROCEDURE — 82043 UR ALBUMIN QUANTITATIVE: CPT

## 2025-01-14 PROCEDURE — 36415 COLL VENOUS BLD VENIPUNCTURE: CPT

## 2025-01-14 PROCEDURE — 85025 COMPLETE CBC W/AUTO DIFF WBC: CPT

## 2025-01-14 PROCEDURE — 80048 BASIC METABOLIC PNL TOTAL CA: CPT

## 2025-01-14 PROCEDURE — 82570 ASSAY OF URINE CREATININE: CPT

## 2025-01-15 ENCOUNTER — OFFICE VISIT (OUTPATIENT)
Dept: ENDOCRINOLOGY | Facility: CLINIC | Age: 60
End: 2025-01-15
Payer: COMMERCIAL

## 2025-01-15 VITALS
DIASTOLIC BLOOD PRESSURE: 78 MMHG | HEIGHT: 70 IN | WEIGHT: 216 LBS | HEART RATE: 77 BPM | BODY MASS INDEX: 30.92 KG/M2 | TEMPERATURE: 96.7 F | SYSTOLIC BLOOD PRESSURE: 130 MMHG

## 2025-01-15 DIAGNOSIS — E78.2 MIXED HYPERLIPIDEMIA: ICD-10-CM

## 2025-01-15 DIAGNOSIS — Z79.4 TYPE 2 DIABETES MELLITUS WITH DIABETIC PERIPHERAL ANGIOPATHY WITHOUT GANGRENE, WITH LONG-TERM CURRENT USE OF INSULIN (HCC): Primary | ICD-10-CM

## 2025-01-15 DIAGNOSIS — I10 HYPERTENSION, UNSPECIFIED TYPE: ICD-10-CM

## 2025-01-15 DIAGNOSIS — E11.51 TYPE 2 DIABETES MELLITUS WITH DIABETIC PERIPHERAL ANGIOPATHY WITHOUT GANGRENE, WITH LONG-TERM CURRENT USE OF INSULIN (HCC): Primary | ICD-10-CM

## 2025-01-15 PROCEDURE — 99214 OFFICE O/P EST MOD 30 MIN: CPT | Performed by: STUDENT IN AN ORGANIZED HEALTH CARE EDUCATION/TRAINING PROGRAM

## 2025-01-15 NOTE — ASSESSMENT & PLAN NOTE
Lantus is being used minimally, and likely ineffectively based on intermittent dosing schedule. Will discontinue. C/w metformin. Discussed established A1c goal <7%. We agreed to proceed with dedicated focus to diet and lifestyle. We will review A1c in 4-mo. If remains above goal or not significantly improved, discussed consideration for augmentation of therapy. Given ASCVD history, would preferentially consider glp/sgtl2i. He was counseled on diabetic eye disease and recommended to schedule his DM eye exam  Lab Results   Component Value Date    HGBA1C 8.0 (H) 01/14/2025     Orders:  •  Basic metabolic panel; Future  •  Hemoglobin A1C; Future

## 2025-01-15 NOTE — PROGRESS NOTES
"Name: Brent Hall      : 1965      MRN: [id number]  Encounter Provider: Ulises Lynne DO  Encounter Date: 1/15/2025   Encounter department: [facility]  :  Assessment & Plan  Type 2 diabetes mellitus with diabetic peripheral angiopathy without gangrene, with long-term current use of insulin (HCC)  Lantus is being used minimally, and likely ineffectively based on intermittent dosing schedule. Will discontinue. C/w metformin. Discussed established A1c goal <7%. We agreed to proceed with dedicated focus to diet and lifestyle. We will review A1c in 4-mo. If remains above goal or not significantly improved, discussed consideration for augmentation of therapy. Given ASCVD history, would preferentially consider glp/sgtl2i. He was counseled on diabetic eye disease and recommended to schedule his DM eye exam  Lab Results   Component Value Date    HGBA1C 8.0 (H) 2025     Orders:  •  Basic metabolic panel; Future  •  Hemoglobin A1C; Future    Hypertension, unspecified type  Good control       Mixed hyperlipidemia  Cw statin         RTC 4-months    History of Present Illness   HPI  Brent Hall is a 59 y.o. male who presents in follow up of T2D. T2D c/b hx MI, history of adenocarcinoma of right niecy-colon sp hemicolectomy.    He mentions surprise around A1c 8%, which he expected to be higher based on habits of late. He will give lantus on a PRN basis at night depending on evening BG >140). He will dose lantus 10 units on roughly 4 days of the week.     He has been in between jobs. This has led to a change in lifestyle habits, including diet and exercise.         Review of Systems   All other systems reviewed and are negative.         Objective   /78 (Patient Position: Sitting, Cuff Size: Standard)   Pulse 77   Temp (!) 96.7 °F (35.9 °C) (Temporal)   Ht 5' 10\" (1.778 m)   Wt 98 kg (216 lb)   BMI 30.99 kg/m²      Physical Exam  Vitals reviewed. "   Constitutional:       General: He is not in acute distress.     Appearance: Normal appearance.   HENT:      Head: Normocephalic and atraumatic.      Nose: Nose normal.   Eyes:      General: No scleral icterus.     Conjunctiva/sclera: Conjunctivae normal.   Pulmonary:      Effort: Pulmonary effort is normal. No respiratory distress.   Musculoskeletal:         General: No deformity.      Cervical back: Normal range of motion.   Skin:     General: Skin is warm and dry.   Neurological:      Mental Status: He is alert.   Psychiatric:         Mood and Affect: Mood normal.         Behavior: Behavior normal.         Component      Latest Ref Rng 1/14/2025   Sodium      135 - 147 mmol/L 139    Potassium      3.5 - 5.3 mmol/L 4.7    Chloride      96 - 108 mmol/L 106    Carbon Dioxide      21 - 32 mmol/L 28    ANION GAP      4 - 13 mmol/L 5    BUN      5 - 25 mg/dL 17    Creatinine      0.60 - 1.30 mg/dL 1.08    GLUCOSE, FASTING      65 - 99 mg/dL 169 (H)    Calcium      8.4 - 10.2 mg/dL 9.5    GFR, Calculated      ml/min/1.73sq m 74    Cholesterol      See Comment mg/dL 114    Triglycerides      See Comment mg/dL 95    HDL      >=40 mg/dL 39 (L)    LDL Calculated      0 - 100 mg/dL 56    EXT Creatinine Urine      Reference range not established. mg/dL 147.4    Albumin,U,Random      <20.0 mg/L 19.9    Albumin Creat Ratio      0 - 30 mg/g creatinine 14    Hemoglobin A1C      Normal 4.0-5.6%; PreDiabetic 5.7-6.4%; Diabetic >=6.5%; Glycemic control for adults with diabetes <7.0% % 8.0 (H)    eAG, EST AVG Glucose      mg/dl 183       Legend:  (H) High  (L) Low

## 2025-01-24 ENCOUNTER — TELEPHONE (OUTPATIENT)
Age: 60
End: 2025-01-24

## 2025-01-24 NOTE — TELEPHONE ENCOUNTER
"ED Provider Note    CHIEF COMPLAINT  Chief Complaint   Patient presents with   • Cold Symptoms     x weeks, with productive cough       HPI  Donna Isaac is a 54 y.o. female with a remote history of asthma, CHF, and fibromyalgia who presents complaining of cough and shortness of breath.    Patient states she has been sick with an upper respiratory infection for the last 2 weeks.  Four days ago she developed dyspnea on exertion.  She feels better at rest.  She reports green sputum with cough.  She reports sore throat but no difficulty swallowing.  Patient states she has multiple antibiotic allergies.  She has used an albuterol inhaler in the past with relief of similar symptoms.  Patient denies nausea, vomiting, chest pain, diaphoresis, calf pain, HRT, leg swelling, recent immobilization.      ALLERGIES  Allergies   Allergen Reactions   • Bactrim [Sulfamethoxazole W-Trimethoprim] Shortness of Breath   • Bee Venom Anaphylaxis   • Econazole Anaphylaxis   • Floxin [Ofloxacin] Anaphylaxis, Shortness of Breath and Swelling     Cause throat swelling and difficulty breathing   • Gadolinium Derivatives Hives and Swelling     Throat swelling   • Iodine Shortness of Breath and Anaphylaxis     Throat and tongue swelling with IV contrast   • Keflex Shortness of Breath   • Levaquin Shortness of Breath     anaphlyaxis   • Levofloxacin Shortness of Breath   • Morphine Anaphylaxis   • Naloxone Hives     \"hives, SOB\"   • Nitrofurantoin Shortness of Breath     ...and hives     • Norco [Apap-Fd&C Yellow #10 Al Tai-Hydrocodone] Shortness of Breath     ...and hives     • Oxycodone Shortness of Breath     ...and hives     • Penicillins Shortness of Breath     ...and hives     • Tape Contact Dermatitis     Blisters, clear tegaderm ok.. No steristrips.   • Ancef [Cefazolin] Hives   • Bextra [Valdecoxib] Rash     \"Skin burn and peeling.\"   • Flagyl [Kdc:Metronidazole+Tartrazine] Hives   • Linezolid Rash     Rash all over body   • " Spoke with Ed from [facility] who wanted to confirm frequency of [facility] testing for patient. Per Ed, the order form submitted requests testing every 4 weeks (uploaded under media on 9/11/24). When Ed spoke with patient, the patient said that was too frequent to have these tests done.     Best callback number: [phone]    "Other Drug Hives     \"Enconazole nitrate.\" shortness of breath and hives   • Other Misc Unspecified     Adhesive tape: blisters, skin peels off   • Buprenorphine Anaphylaxis   • Clindamycin      HIVES     • Tygacil [Tigecycline]      itching   • Zithromax [Azithromycin] Hives and Shortness of Breath     Pt had Hives also       CURRENT MEDICATIONS  Home Medications     Reviewed by Apolinar Payton (Pharmacy Tech) on 01/21/20 at 1633  Med List Status: Complete   Medication Last Dose Status   acetaminophen (TYLENOL) 325 MG Tab 1/20/2020 Active   AIMOVIG 70 MG/ML Solution Auto-injector 1/1/2020 Active   Biotin 5000 MCG Cap 1/21/2020 Active   calcitonin, salmon, (MIACALCIN) 200 UNIT/ACT Solution 1/20/2020 Active   CALCIUM-MAGNESIUM-ZINC PO 1/21/2020 Active   carvedilol (COREG) 25 MG Tab 1/21/2020 Active   cetirizine (KLS ALLER-HAWA) 10 MG Tab 1/21/2020 Active   cevimeline (EVOXAC) 30 MG capsule 1/21/2020 Active   Cholecalciferol (VITAMIN D) 2000 units Cap 1/21/2020 Active   colesevelam (WELCHOL) 625 MG Tab 1/21/2020 Active   Cyanocobalamin (B-12) 1000 MCG/ML Kit 1/14/2020 Active   Dexlansoprazole (DEXILANT) 60 MG CAPSULE DELAYED RELEASE delayed-release capsule 1/21/2020 Active   DULoxetine (CYMBALTA) 60 MG Cap DR Particles delayed-release capsule 1/20/2020 Active   EPINEPHrine (EPIPEN) 0.3 MG/0.3ML Solution Auto-injector solution for injection >3 months Active   estradiol (ESTRACE) 0.5 MG tablet 1/21/2020 Active   famotidine (PEPCID) 40 MG Tab 1/20/2020 Active   Frovatriptan Succinate (FROVA) 2.5 MG Tab > 3 days Active   furosemide (LASIX) 20 MG Tab 1/21/2020 Active   gabapentin (NEURONTIN) 400 MG Cap 1/21/2020 Active   liothyronine (CYTOMEL) 25 MCG Tab 1/21/2020 Active   losartan (COZAAR) 100 MG Tab 1/21/2020 Active   magnesium oxide (MAG-OX) 400 MG Tab 1/21/2020 Active   meloxicam (MOBIC) 15 MG tablet 1/20/2020 Active   Menaquinone-7 (VITAMIN K2) 100 MCG Cap 1/21/2020 Active   montelukast (SINGULAIR) 10 MG Tab " "1/20/2020 Active   multivitamin (THERAGRAN) Tab 1/21/2020 Active   pantoprazole (PROTONIX) 40 MG Tablet Delayed Response 1/20/2020 Active   potassium chloride SA (KDUR) 20 MEQ Tab CR 1/21/2020 Active   Probiotic Product (PROBIOTIC PO) 1/21/2020 Active   Somatropin 10 MG Recon Soln 1/20/2020 Active   spironolactone (ALDACTONE) 25 MG Tab 1/21/2020 Active   SYNTHROID 75 MCG Tab 1/21/2020 Active   tizanidine (ZANAFLEX) 4 MG Tab 1/21/2020 Active                PAST MEDICAL HISTORY   has a past medical history of AAA (abdominal aortic aneurysm) (HCC), Asthma, Chronic pain, Congestive heart failure (HCC), Fibromyalgia, GERD (gastroesophageal reflux disease), Hypertension, Migraine, Osteoporosis, Renal disorder, and Urticaria.    SURGICAL HISTORY   has a past surgical history that includes appendectomy (1974); gastroscopy (N/A, 2/7/2019); hysterectomy, total abdominal (1995); gastric bypass laparoscopic (1999); abdominal exploration (2002); shoulder decompression arthroscopic (Left, 2/19/2019); clavicle distal excision (Left, 2/19/2019); and shoulder arthroscopy w/ bicipital tenodesis repair (Left, 2/19/2019).    SOCIAL HISTORY  Social History     Tobacco Use   • Smoking status: Never Smoker   • Smokeless tobacco: Never Used   Substance and Sexual Activity   • Alcohol use: Not Currently   • Drug use: No   • Sexual activity: Not on file       Family Hx:  No history of PE/DVT      REVIEW OF SYSTEMS  See HPI for further details.  All other systems are negative except as above in HPI.      PHYSICAL EXAM  VITAL SIGNS: /93   Pulse 91   Temp 36.2 °C (97.1 °F) (Temporal)   Resp 16   Ht 1.626 m (5' 4\")   Wt 83.3 kg (183 lb 10.3 oz)   LMP 02/07/2016 (Within Months)   SpO2 88% Comment: ambulatory  BMI 31.52 kg/m²     General:  WD overweight, nontoxic appearing in NAD; A+Ox3; V/S as above; afebrile  Skin: warm and dry; slightly pale color; no rash  HEENT: NCAT; EOMs intact; PERRL; no scleral icterus   Neck: FROM; no " meningismus, no LAD; no JVD, no stridor; negative tracheal rock; oropharynx clear  Cardiovascular: Regular heart rate and rhythm.  No murmurs, rubs, or gallops; pulses 2+ bilaterally radially  Lungs: Clear to auscultation with decreased air movement bilaterally.  No wheezes, rhonchi, or rales.   Abdomen: soft; NTND; no rebound, guarding, or rigidity.  No organomegaly or pulsatile mass  Extremities: MAHARAJ x 4; no e/o trauma; no pedal edema; neg Atiya's  Neurologic: CNs III-XII grossly intact; speech clear; distal sensation intact; strength 5/5 UE/LEs  Psychiatric: Appropriate affect, normal mood    LABS  Results for orders placed or performed during the hospital encounter of 01/21/20   CBC WITH DIFFERENTIAL   Result Value Ref Range    WBC 5.1 4.8 - 10.8 K/uL    RBC 4.66 4.20 - 5.40 M/uL    Hemoglobin 12.5 12.0 - 16.0 g/dL    Hematocrit 40.0 37.0 - 47.0 %    MCV 85.8 81.4 - 97.8 fL    MCH 26.8 (L) 27.0 - 33.0 pg    MCHC 31.3 (L) 33.6 - 35.0 g/dL    RDW 47.4 35.9 - 50.0 fL    Platelet Count 317 164 - 446 K/uL    MPV 10.2 9.0 - 12.9 fL    Neutrophils-Polys 79.40 (H) 44.00 - 72.00 %    Lymphocytes 10.30 (L) 22.00 - 41.00 %    Monocytes 7.50 0.00 - 13.40 %    Eosinophils 1.60 0.00 - 6.90 %    Basophils 0.40 0.00 - 1.80 %    Immature Granulocytes 0.80 0.00 - 0.90 %    Nucleated RBC 0.00 /100 WBC    Neutrophils (Absolute) 4.03 2.00 - 7.15 K/uL    Lymphs (Absolute) 0.52 (L) 1.00 - 4.80 K/uL    Monos (Absolute) 0.38 0.00 - 0.85 K/uL    Eos (Absolute) 0.08 0.00 - 0.51 K/uL    Baso (Absolute) 0.02 0.00 - 0.12 K/uL    Immature Granulocytes (abs) 0.04 0.00 - 0.11 K/uL    NRBC (Absolute) 0.00 K/uL   Basic Metabolic Panel   Result Value Ref Range    Sodium 135 135 - 145 mmol/L    Potassium 4.5 3.6 - 5.5 mmol/L    Chloride 99 96 - 112 mmol/L    Co2 21 20 - 33 mmol/L    Glucose 162 (H) 65 - 99 mg/dL    Bun 18 8 - 22 mg/dL    Creatinine 1.12 0.50 - 1.40 mg/dL    Calcium 9.4 8.4 - 10.2 mg/dL    Anion Gap 15.0 (H) 0.0 - 11.9   proBrain  Natriuretic Peptide, NT   Result Value Ref Range    NT-proBNP 315 (H) 0 - 125 pg/mL   ESTIMATED GFR   Result Value Ref Range    GFR If African American >60 >60 mL/min/1.73 m 2    GFR If Non African American 50 (A) >60 mL/min/1.73 m 2   EKG   Result Value Ref Range    Report       [facility] Emergency Dept.    Test Date:  2020  Pt Name:    CARMINA MORAN              Department: [facility]  MRN:        [id number]                      Room:  Gender:     Female                       Technician: [id number]  :        1965                   Requested By:ER TRIAGE PROTOCOL  Order #:    [id number]                    Reading MD: MARIELLE BENAVIDEZ MD    Measurements  Intervals                                Axis  Rate:       78                           P:          21  MT:         218                          QRS:        3  QRSD:       92                           T:          75  QT:         375  QTc:        428    Interpretive Statements  Sinus rhythm  Prolonged MT interval  Borderline T abnormalities, anterior leads  Compared to ECG 12/10/2019 14:20:50  T-wave abnormality now present  ST (T wave) deviation no longer present  Left ventricular hypertrophy no longer present  Early repolarization no longer present  Electronically Signed On 20 15:01:23 PST by MARIELLE BENAVIDEZ MD         IMAGING  DX-CHEST-2 VIEWS   Final Result      No radiographic evidence of acute cardiopulmonary process.        MEDICAL RECORD  I have reviewed patient's medical record and pertinent results are listed below.      COURSE & MEDICAL DECISION MAKING  I have reviewed any medical record information, laboratory studies and radiographic results as noted.    Carmina Moran is a 54 y.o. female who presents complaining of dyspnea on exertion.  This developed in the setting of upper respiratory infection symptoms now with increased sputum that is green in color and blood-streaked.  Patient has a history of asthma  with reactive airways.  Patient has multiple antibiotic allergies.  We discussed that the majority of these types of infections are viral.  Patient is not a smoker.    EKG demonstrates no STEMI.    Chest x-ray reveals no focal consolidation or pulmonary edema.    1:44 PM  Patient's ambulatory pulse ox was 86 to 90%.  She reports shortness of breath.  Nebulizer and prednisone were ordered.      Pt was re-evaluated at 3:00 PM  Patient has had nebulizer x1.  She feels slightly improved.  Repeat ambulatory pulse ox was 90%.  I have ordered a second nebulizer.  Patient expresses the desire to go home despite her O2 sat may be not reaching  following this next nebulizer    Patient was ambulated after her second nebulizer and found to have an O2 sat of 88% on room air.  We will hospitalize for further nebulizers and work-up, which she agrees to.  Labs are now ordered.    Results for orders placed or performed during the hospital encounter of 01/21/20   Influenza A/B By PCR (Adult - Flu Only)   Result Value Ref Range    Influenza virus A RNA Negative Negative    Influenza virus B, PCR Negative Negative   CBC WITH DIFFERENTIAL   Result Value Ref Range    WBC 5.1 4.8 - 10.8 K/uL    RBC 4.66 4.20 - 5.40 M/uL    Hemoglobin 12.5 12.0 - 16.0 g/dL    Hematocrit 40.0 37.0 - 47.0 %    MCV 85.8 81.4 - 97.8 fL    MCH 26.8 (L) 27.0 - 33.0 pg    MCHC 31.3 (L) 33.6 - 35.0 g/dL    RDW 47.4 35.9 - 50.0 fL    Platelet Count 317 164 - 446 K/uL    MPV 10.2 9.0 - 12.9 fL    Neutrophils-Polys 79.40 (H) 44.00 - 72.00 %    Lymphocytes 10.30 (L) 22.00 - 41.00 %    Monocytes 7.50 0.00 - 13.40 %    Eosinophils 1.60 0.00 - 6.90 %    Basophils 0.40 0.00 - 1.80 %    Immature Granulocytes 0.80 0.00 - 0.90 %    Nucleated RBC 0.00 /100 WBC    Neutrophils (Absolute) 4.03 2.00 - 7.15 K/uL    Lymphs (Absolute) 0.52 (L) 1.00 - 4.80 K/uL    Monos (Absolute) 0.38 0.00 - 0.85 K/uL    Eos (Absolute) 0.08 0.00 - 0.51 K/uL    Baso (Absolute) 0.02 0.00 - 0.12  K/uL    Immature Granulocytes (abs) 0.04 0.00 - 0.11 K/uL    NRBC (Absolute) 0.00 K/uL   Basic Metabolic Panel   Result Value Ref Range    Sodium 135 135 - 145 mmol/L    Potassium 4.5 3.6 - 5.5 mmol/L    Chloride 99 96 - 112 mmol/L    Co2 21 20 - 33 mmol/L    Glucose 162 (H) 65 - 99 mg/dL    Bun 18 8 - 22 mg/dL    Creatinine 1.12 0.50 - 1.40 mg/dL    Calcium 9.4 8.4 - 10.2 mg/dL    Anion Gap 15.0 (H) 0.0 - 11.9   proBrain Natriuretic Peptide, NT   Result Value Ref Range    NT-proBNP 315 (H) 0 - 125 pg/mL   RAPID STREP,CULT IF INDICATED   Result Value Ref Range    Significant Indicator NEG     Source THRT     Site -     Rapid Strep Screen       Negative for Group A streptococcus.  A negative result may be obtained if the specimen is  inadequate or antigen concentration is below the  sensitivity of the test. This negative test will be followed  up with a culture as requested.     ESTIMATED GFR   Result Value Ref Range    GFR If African American >60 >60 mL/min/1.73 m 2    GFR If Non African American 50 (A) >60 mL/min/1.73 m 2   CBC with Differential   Result Value Ref Range    WBC 6.8 4.8 - 10.8 K/uL    RBC 4.70 4.20 - 5.40 M/uL    Hemoglobin 12.6 12.0 - 16.0 g/dL    Hematocrit 40.3 37.0 - 47.0 %    MCV 85.7 81.4 - 97.8 fL    MCH 26.8 (L) 27.0 - 33.0 pg    MCHC 31.3 (L) 33.6 - 35.0 g/dL    RDW 48.3 35.9 - 50.0 fL    Platelet Count 343 164 - 446 K/uL    MPV 10.7 9.0 - 12.9 fL    Neutrophils-Polys 89.30 (H) 44.00 - 72.00 %    Lymphocytes 6.40 (L) 22.00 - 41.00 %    Monocytes 4.10 0.00 - 13.40 %    Eosinophils 0.00 0.00 - 6.90 %    Basophils 0.10 0.00 - 1.80 %    Immature Granulocytes 0.10 0.00 - 0.90 %    Nucleated RBC 0.00 /100 WBC    Neutrophils (Absolute) 6.04 2.00 - 7.15 K/uL    Lymphs (Absolute) 0.43 (L) 1.00 - 4.80 K/uL    Monos (Absolute) 0.28 0.00 - 0.85 K/uL    Eos (Absolute) 0.00 0.00 - 0.51 K/uL    Baso (Absolute) 0.01 0.00 - 0.12 K/uL    Immature Granulocytes (abs) 0.01 0.00 - 0.11 K/uL    NRBC (Absolute)  0.00 K/uL   Basic Metabolic Panel (BMP)   Result Value Ref Range    Sodium 137 135 - 145 mmol/L    Potassium 4.6 3.6 - 5.5 mmol/L    Chloride 99 96 - 112 mmol/L    Co2 21 20 - 33 mmol/L    Glucose 169 (H) 65 - 99 mg/dL    Bun 14 8 - 22 mg/dL    Creatinine 1.01 0.50 - 1.40 mg/dL    Calcium 9.7 8.4 - 10.2 mg/dL    Anion Gap 17.0 (H) 0.0 - 11.9   ESTIMATED GFR   Result Value Ref Range    GFR If African American >60 >60 mL/min/1.73 m 2    GFR If Non  57 (A) >60 mL/min/1.73 m 2   EKG   Result Value Ref Range    Report       [facility] Emergency Dept.    Test Date:  2020  Pt Name:    CARMINA MORAN              Department: [facility]  MRN:        [id number]                      Room:  Gender:     Female                       Technician: [id number]  :        1965                   Requested By:ER TRIAGE PROTOCOL  Order #:    [id number]                    Reading MD: ETTA RDZ MD    Measurements  Intervals                                Axis  Rate:       78                           P:          21  OR:         218                          QRS:        3  QRSD:       92                           T:          75  QT:         375  QTc:        428    Interpretive Statements  Sinus rhythm  Prolonged OR interval  Borderline T abnormalities, anterior leads  Compared to ECG 12/10/2019 14:20:50  T-wave abnormality now present  ST (T wave) deviation no longer present  Left ventricular hypertrophy no longer present  Early repolarization no longer present  Electronically Signed On 20 15:01:23 PST by ETTA RDZ MD       I discussed the case with Dr. Fox from the hospitalist service who agrees to see the patient.      FINAL IMPRESSION  1. Upper respiratory tract infection, unspecified type     2. Reactive airway disease with acute exacerbation, unspecified asthma severity, unspecified whether persistent     3. Hypoxia         Electronically signed by: Etta Rdz  M.D., 1/21/2020 1:44 PM

## 2025-01-29 ENCOUNTER — TELEPHONE (OUTPATIENT)
Dept: GASTROENTEROLOGY | Facility: CLINIC | Age: 60
End: 2025-01-29

## 2025-01-29 ENCOUNTER — PREP FOR PROCEDURE (OUTPATIENT)
Dept: GASTROENTEROLOGY | Facility: CLINIC | Age: 60
End: 2025-01-29

## 2025-01-29 DIAGNOSIS — Z86.0100 HISTORY OF COLON POLYPS: Primary | ICD-10-CM

## 2025-01-29 RX ORDER — SODIUM CHLORIDE, SODIUM LACTATE, POTASSIUM CHLORIDE, CALCIUM CHLORIDE 600; 310; 30; 20 MG/100ML; MG/100ML; MG/100ML; MG/100ML
125 INJECTION, SOLUTION INTRAVENOUS CONTINUOUS
OUTPATIENT
Start: 2025-01-29

## 2025-01-29 NOTE — TELEPHONE ENCOUNTER
spoke to his wife Destiny and scheduled him Friday April 25th w/ Dr. Ortega in [place]. Will send Miralax/Dulcolax prep instructions to home address, as note from last colonoscopy states prep was sufficient.

## 2025-01-30 NOTE — TELEPHONE ENCOUNTER
Left voicemail for Ed at [facility] and advised Dr. Leyva approves the testing to be changed to 6 months.  Hope line number left for any further questions.

## 2025-02-27 DIAGNOSIS — Z79.4 TYPE 2 DIABETES MELLITUS WITHOUT COMPLICATION, WITH LONG-TERM CURRENT USE OF INSULIN (HCC): ICD-10-CM

## 2025-02-27 DIAGNOSIS — E11.9 TYPE 2 DIABETES MELLITUS WITHOUT COMPLICATION, WITH LONG-TERM CURRENT USE OF INSULIN (HCC): ICD-10-CM

## 2025-02-27 RX ORDER — METFORMIN HYDROCHLORIDE 500 MG/1
TABLET, EXTENDED RELEASE ORAL
Qty: 360 TABLET | Refills: 1 | Status: SHIPPED | OUTPATIENT
Start: 2025-02-27

## 2025-02-28 ENCOUNTER — TELEPHONE (OUTPATIENT)
Dept: HEMATOLOGY ONCOLOGY | Facility: CLINIC | Age: 60
End: 2025-02-28

## 2025-02-28 ENCOUNTER — OFFICE VISIT (OUTPATIENT)
Age: 60
End: 2025-02-28
Payer: COMMERCIAL

## 2025-02-28 ENCOUNTER — PATIENT OUTREACH (OUTPATIENT)
Dept: HEMATOLOGY ONCOLOGY | Facility: CLINIC | Age: 60
End: 2025-02-28

## 2025-02-28 VITALS
SYSTOLIC BLOOD PRESSURE: 136 MMHG | HEIGHT: 70 IN | DIASTOLIC BLOOD PRESSURE: 84 MMHG | BODY MASS INDEX: 31.21 KG/M2 | WEIGHT: 218 LBS

## 2025-02-28 DIAGNOSIS — C18.2 MALIGNANT NEOPLASM OF ASCENDING COLON (HCC): Primary | ICD-10-CM

## 2025-02-28 PROCEDURE — 99215 OFFICE O/P EST HI 40 MIN: CPT | Performed by: COLON & RECTAL SURGERY

## 2025-02-28 NOTE — ASSESSMENT & PLAN NOTE
1. Malignant neoplasm of ascending colon (HCC)  Assessment & Plan:  Brent returns today, he is ~8months out from robotic assisted right hemicolectomy,6/24/24.      A. Terminal ileum, appendix,  right colon (right hemicolectomy):  - Adenocarcinoma with mucinous features (4.0cm)  - Lymph-vascular and perineural invasion present  - Metastatic carcinoma involves one (1) of twenty-six (26) lymph nodes (1/26)  - Margins negative for carcinoma   - See staging synoptic (pT3N1a)     He was discussed at tumor conference, recommendation was made for adjuvant chemotherapy,saw 2nd opinion with Dr. Leyva as he refused chemotherapy which did not change, ctDNA surveillance was ordered instead.     -He states last CT DNA was not drawn due to Pretty, he is trying to set this up again.  -He should continue with surveillance with hematology/oncology due to the above, lab/imaging as well.  -He has colonoscopy scheduled in April with Dr. Ortega  -Will see him in surgical office visit follow-up in 9 months  CC:  MD Dr. Jordan Estevez Dr.,JUAN

## 2025-02-28 NOTE — PROGRESS NOTES
Spoke with pts wife and explained Dr Leyva's team will be reaching out to schedule a follow up appointment. She understood    Message sent to the medical oncology team to schedule a follow up appointment

## 2025-02-28 NOTE — TELEPHONE ENCOUNTER
Spoke with patient spouse to schedule FU appt with Dr. Leyva. Destiny said that Virtual appointment may be better and easier for the patient in order for him to keep the appointment. Informed her that I will message the nurse and see if we are able to do a virtual appointment on 4/7 @ 2pm. Informed that if there is anything wrong with this date or time the office will give her a call back .

## 2025-04-03 ENCOUNTER — TELEPHONE (OUTPATIENT)
Dept: HEMATOLOGY ONCOLOGY | Facility: CLINIC | Age: 60
End: 2025-04-03

## 2025-04-03 NOTE — TELEPHONE ENCOUNTER
Spoke with patient in regards to obtaining labwork prior to upcoming appointment with Dr. Leyva on 4/7.  Advised patient labs are non fasting and in system.  Patient verbalized understanding.

## 2025-04-07 ENCOUNTER — TELEMEDICINE (OUTPATIENT)
Dept: HEMATOLOGY ONCOLOGY | Facility: CLINIC | Age: 60
End: 2025-04-07
Payer: COMMERCIAL

## 2025-04-07 ENCOUNTER — TELEPHONE (OUTPATIENT)
Dept: HEMATOLOGY ONCOLOGY | Facility: CLINIC | Age: 60
End: 2025-04-07

## 2025-04-07 DIAGNOSIS — C18.2 MALIGNANT NEOPLASM OF ASCENDING COLON (HCC): Primary | ICD-10-CM

## 2025-04-07 DIAGNOSIS — D50.0 IRON DEFICIENCY ANEMIA DUE TO CHRONIC BLOOD LOSS: ICD-10-CM

## 2025-04-07 PROCEDURE — 99212 OFFICE O/P EST SF 10 MIN: CPT | Performed by: INTERNAL MEDICINE

## 2025-04-07 NOTE — TELEPHONE ENCOUNTER
A call was placed to Brent to schedule his Virtual F/U, reached his work office number. A message was lrft to call the office to schedule the follow up.

## 2025-04-08 ENCOUNTER — TELEPHONE (OUTPATIENT)
Dept: HEMATOLOGY ONCOLOGY | Facility: CLINIC | Age: 60
End: 2025-04-08

## 2025-04-08 NOTE — ASSESSMENT & PLAN NOTE
Appears to be in remission  Orders:    CBC and differential; Future    Comprehensive metabolic panel; Future    CEA; Future    Iron Panel (Includes Ferritin, Iron Sat%, Iron, and TIBC); Future

## 2025-04-08 NOTE — TELEPHONE ENCOUNTER
Left message for patient in regards to obtaining labwork ordered by Dr. Leyva.  Advised patient labs are non fasting and in system.  Advised patient to call office back if they are unable to obtain labwork prior to appointment.  [employer] number provided.

## 2025-04-08 NOTE — PROGRESS NOTES
Virtual Brief Visit  Name: Brent Hall      : 1965      MRN: [id number]  Encounter Provider: Ko Leyva MD  Encounter Date: 2025   Encounter department: [facility] [place]  :  Assessment & Plan  Malignant neoplasm of ascending colon (HCC)  Appears to be in remission  Orders:    CBC and differential; Future    Comprehensive metabolic panel; Future    CEA; Future    Iron Panel (Includes Ferritin, Iron Sat%, Iron, and TIBC); Future    Iron deficiency anemia due to chronic blood loss    Orders:    Iron Panel (Includes Ferritin, Iron Sat%, Iron, and TIBC); Future  Anemia has improved.  Hemoglobin 13.2.  MCV remains low at 79.  Blood work this month.  Signatera prior to next visit in 6 months.  Discussed overall condition with the patient.  Goal is cure from colon cancer.  I suggested eating healthy foods, activities as tolerated, patient to avoid falls and trauma.  Suggested health screening tests. Patient to continue to follow-up with primary physician and other consultants.  Provided counseling and support.  I used a dictation device to dictate this note and there could be mistakes in my note and for that patient may contact my office.      History of Present Illness   HPI  Patient had right hemicolectomy 2020 for for stage III right colon cancer with 1 of 26 positive lymph nodes and positive lymphovascular invasion and perineural invasion.  Grade 2.  pT3 pN1a.  Patient declined chemotherapy.  She wanted Ct DNA and that was negative and is negative again this time.  There was a tiny nodule in the lung and patient will be contacted for CT scan.  I did not discuss that with him today.  CEA was 4.8 and that will also be checked.  Patient would like to have another Ct DNA test in 6 months.  We does not offer any symptom at present.  No neurological, cardiac, pulmonary, GI and  symptoms.  No fevers, chills, bleeding, bone pains, skin rash, weight loss, night sweats  and no swelling of the ankles and no swollen glands.  Not unusually tired.  No arthritic symptoms.  CBC and differential  Status: Final result      Contains abnormal data CBC and differential  Order: [id number]   Status: Final result       Next appt: 04/18/2025 at 02:00 PM in Cardiology (NEVAE[facility] Yost)       Dx: Malignant neoplasm of ascending colon...    Test Result Released: Yes (seen)    0 Result Notes            Component  Ref Range & Units (hover) 1/14/25  8:42 AM 10/26/24  7:11 PM 8/23/24  5:17 PM 6/25/24  5:11 AM 6/24/24  2:52 PM 6/20/24  4:45 PM 6/13/24  4:39 PM   WBC 5.03 9.76 4.82 8.28  5.29 4.56   RBC 5.23 5.07 4.97 R 4.06  4.91 4.54   Hemoglobin 13.2 12.3 11.5 Low  R 9.4 Low   11.2 Low  10.2 Low    Hematocrit 41.1 38.7 37.3 R 31.7 Low   37.9 34.9 Low    MCV 79 Low  76 Low  75 Low  78 Low   77 Low  77 Low    MCH 25.2 Low  24.3 Low  23.1 Low  23.2 Low   22.8 Low  22.5 Low    MCHC 32.1 31.8 30.8 Low  29.7 Low   29.6 Low  29.2 Low    RDW 15.5 High  18.1 High  16.5 High  25.1 High   26.1 High  27.4 High    MPV 9.9 9.9 9.7 10.1 9.0 9.9 9.5   Platelets 194 198 186 170 149 224 194   nRBC 0 0 0 0  0 0   Segmented % 62 86 High  54 83 High   57 56   Immature Grans % 0 0 0 0  0 0   Lymphocytes % 24 5 Low  32 8 Low   28 29   Monocytes % 11 8 12 9  12 12   Eosinophils Relative 2 1 2 0  2 2   Basophils Relative 1 0 0 0  1 1   Absolute Neutrophils 3.19 8.33 High  2.52 6.84  3.01 2.59   Absolute Immature Grans 0.02 0.04 0.02 0.03  0.01 0.01   Absolute Lymphocytes 1.18 0.49 Low  1.56 0.63  1.48 1.30   Absolute Monocytes 0.53 0.77 0.60 0.78  0.64 0.53   Eosinophils Absolute 0.08 0.11 0.10 0.00  0.11 0.10   Basophils Absolute 0.03 0.02 0.02 0.00  0.04 0.03             Specimen Collected: 01/14/25  8:42 AM   Comprehensive metabolic panel  Status: Final result      Contains abnormal data Comprehensive metabolic panel  Order: [id number]   Status: Final result       Next appt: 04/18/2025 at 02:00 PM in Cardiology (Alexandra SILVERMAN  NEVAE[facility] Holt)    Test Result Released: Yes (seen)    0 Result Notes       1 HM Topic   important suggestion  Newer results are available. Click to view them now.                Component  Ref Range & Units (hover) 10/26/24  7:11 PM 8/23/24  5:17 PM 6/26/24  6:18 AM 6/25/24  5:11 AM 4/27/24 11:26 AM 4/20/24 10:13 AM 4/10/24  3:50 PM 4/10/24  3:50 PM   Sodium 134 Low  138 139 140 136 138 137    Potassium 4.3 3.8 4.0 4.0 3.9 4.3 4.0    Chloride 97 105 105 106 101 105 106    CO2 28 25 26 24 25 25 26    ANION GAP 9 8 8 10 10 8 5    BUN 37 High  19 15 16 19 19 17    Creatinine 1.38 High  0.98 CM 0.96 CM 0.88 CM 1.19 CM 1.18 CM 1.08 CM    Comment: Standardized to IDMS reference method   Glucose 232 High   116   High   High   High  CM    Comment: If the patient is fasting, the ADA then defines impaired fasting glucose as > 100 mg/dL and diabetes as > or equal to 123 mg/dL.   Calcium 9.2 9.2 8.2 Low  8.2 Low  9.3 9.0 8.8    AST 11 Low  13 R   15   17   ALT 15 16 CM   15 CM   19 CM   Comment: Specimen collection should occur prior to Sulfasalazine administration due to the potential for falsely depressed results.   Alkaline Phosphatase 58 74   69   62   Total Protein 7.5 6.8   6.6   6.2 Low    Albumin 4.1 4.3   4.4   4.1   Total Bilirubin 0.67 0.32 CM   0.73 CM   0.34 CM   Comment: Use of this assay is not recommended for patients undergoing treatment with eltrombopag due to the potential for falsely elevated results.  N-acetyl-p-benzoquinone imine (metabolite of Acetaminophen) will generate erroneously low results in samples for patients that have taken an overdose of Acetaminophen.   eGFR 55 R 86 94 66 67 74                   Component  Ref Range & Units (hover) 1/14/25  8:42 AM 10/26/24  7:11 PM 8/23/24  5:17 PM 6/26/24  6:18 AM 6/25/24  5:11 AM 4/27/24 11:26 AM 4/20/24 10:13 AM   Sodium 139 134 Low  138 139 140 136 138   Potassium 4.7 4.3 3.8 4.0 4.0 3.9 4.3   Chloride 106 97 105 105 106 101 105    CO2 28 28 25 26 24 25 25   ANION GAP 5 9 8 8 10 10 8   BUN 17 37 High  19 15 16 19 19   Creatinine 1.08 1.38 High  CM 0.98 CM 0.96 CM 0.88 CM 1.19 CM 1.18 CM   Comment: Standardized to IDMS reference method   Glucose, Fasting 169 High   196 High        Calcium 9.5 9.2 9.2 8.2 Low  8.2 Low  9.3 9.0   eGFR 74 55 R 86 94 66 67                        Administrative Statements   Encounter provider Ko Leyva MD    The Patient is located at Home and in the following state in which I hold an active license [place].    The patient was identified by name and date of birth. Brent Hall was informed that this is a telemedicine visit and that the visit is being conducted through the Epic Embedded platform. He agrees to proceed..  My office door was closed. No one else was in the room.  He acknowledged consent and understanding of privacy and security of the video platform. The patient has agreed to participate and understands they can discontinue the visit at any time.    I have spent a total time of 12 minutes in caring for this patient on the day of the visit/encounter.

## 2025-04-08 NOTE — ASSESSMENT & PLAN NOTE
Orders:    Iron Panel (Includes Ferritin, Iron Sat%, Iron, and TIBC); Future  Anemia has improved.  Hemoglobin 13.2.  MCV remains low at 79.

## 2025-04-09 ENCOUNTER — TELEPHONE (OUTPATIENT)
Dept: HEMATOLOGY ONCOLOGY | Facility: CLINIC | Age: 60
End: 2025-04-09

## 2025-04-09 ENCOUNTER — DOCUMENTATION (OUTPATIENT)
Dept: HEMATOLOGY ONCOLOGY | Facility: CLINIC | Age: 60
End: 2025-04-09

## 2025-04-09 DIAGNOSIS — R91.1 LUNG NODULE: ICD-10-CM

## 2025-04-09 DIAGNOSIS — C18.2 MALIGNANT NEOPLASM OF ASCENDING COLON (HCC): Primary | ICD-10-CM

## 2025-04-09 NOTE — TELEPHONE ENCOUNTER
Scheduled CT scan Called and spoke with Patient's wife, Destiny. Reviewed location, date and time. Gave her instructions for Metformin, but she stated patient is not taking that.

## 2025-04-09 NOTE — PROGRESS NOTES
Clifton Carreon and Alem,  I have ordered CT scan of the chest abdomen pelvis with intravenous contrast for him in 1 month.  Please let patient know that he has to get blood work within 4 weeks prior to the CT scan.  Also let him know that he should skip metformin the day before, the day of and day after CT scan.  Thanks  Gordon

## 2025-04-25 ENCOUNTER — HOSPITAL ENCOUNTER (OUTPATIENT)
Dept: PERIOP | Facility: HOSPITAL | Age: 60
Setting detail: OUTPATIENT SURGERY
End: 2025-04-25
Attending: INTERNAL MEDICINE
Payer: COMMERCIAL

## 2025-04-25 ENCOUNTER — ANESTHESIA EVENT (OUTPATIENT)
Dept: PERIOP | Facility: HOSPITAL | Age: 60
End: 2025-04-25
Payer: COMMERCIAL

## 2025-04-25 ENCOUNTER — ANESTHESIA (OUTPATIENT)
Dept: PERIOP | Facility: HOSPITAL | Age: 60
End: 2025-04-25
Payer: COMMERCIAL

## 2025-04-25 VITALS
RESPIRATION RATE: 16 BRPM | HEIGHT: 70 IN | HEART RATE: 69 BPM | TEMPERATURE: 97.7 F | BODY MASS INDEX: 29.2 KG/M2 | SYSTOLIC BLOOD PRESSURE: 135 MMHG | WEIGHT: 204 LBS | OXYGEN SATURATION: 96 % | DIASTOLIC BLOOD PRESSURE: 77 MMHG

## 2025-04-25 DIAGNOSIS — Z86.0100 HISTORY OF COLON POLYPS: ICD-10-CM

## 2025-04-25 LAB
GLUCOSE SERPL-MCNC: 142 MG/DL (ref 65–140)
GLUCOSE SERPL-MCNC: 149 MG/DL (ref 65–140)

## 2025-04-25 PROCEDURE — 82948 REAGENT STRIP/BLOOD GLUCOSE: CPT

## 2025-04-25 PROCEDURE — G0105 COLORECTAL SCRN; HI RISK IND: HCPCS | Performed by: INTERNAL MEDICINE

## 2025-04-25 RX ORDER — SODIUM CHLORIDE, SODIUM LACTATE, POTASSIUM CHLORIDE, CALCIUM CHLORIDE 600; 310; 30; 20 MG/100ML; MG/100ML; MG/100ML; MG/100ML
125 INJECTION, SOLUTION INTRAVENOUS CONTINUOUS
Status: DISCONTINUED | OUTPATIENT
Start: 2025-04-25 | End: 2025-04-29 | Stop reason: HOSPADM

## 2025-04-25 RX ORDER — LIDOCAINE HYDROCHLORIDE 10 MG/ML
INJECTION, SOLUTION EPIDURAL; INFILTRATION; INTRACAUDAL; PERINEURAL AS NEEDED
Status: DISCONTINUED | OUTPATIENT
Start: 2025-04-25 | End: 2025-04-25

## 2025-04-25 RX ORDER — ONDANSETRON 2 MG/ML
4 INJECTION INTRAMUSCULAR; INTRAVENOUS ONCE AS NEEDED
Status: DISCONTINUED | OUTPATIENT
Start: 2025-04-25 | End: 2025-04-29 | Stop reason: HOSPADM

## 2025-04-25 RX ORDER — PROPOFOL 10 MG/ML
INJECTION, EMULSION INTRAVENOUS AS NEEDED
Status: DISCONTINUED | OUTPATIENT
Start: 2025-04-25 | End: 2025-04-25

## 2025-04-25 RX ADMIN — SODIUM CHLORIDE, SODIUM LACTATE, POTASSIUM CHLORIDE, AND CALCIUM CHLORIDE 125 ML/HR: .6; .31; .03; .02 INJECTION, SOLUTION INTRAVENOUS at 07:11

## 2025-04-25 RX ADMIN — LIDOCAINE HYDROCHLORIDE 50 MG: 10 INJECTION, SOLUTION EPIDURAL; INFILTRATION; INTRACAUDAL at 08:00

## 2025-04-25 RX ADMIN — PROPOFOL 100 MG: 10 INJECTION, EMULSION INTRAVENOUS at 08:00

## 2025-04-25 RX ADMIN — PROPOFOL 50 MG: 10 INJECTION, EMULSION INTRAVENOUS at 08:04

## 2025-04-25 RX ADMIN — PROPOFOL 20 MG: 10 INJECTION, EMULSION INTRAVENOUS at 08:11

## 2025-04-25 RX ADMIN — PROPOFOL 50 MG: 10 INJECTION, EMULSION INTRAVENOUS at 08:02

## 2025-04-25 NOTE — ANESTHESIA PREPROCEDURE EVALUATION
Procedure:  COLONOSCOPY    Relevant Problems   CARDIO   (+) Coronary artery disease involving native coronary artery of native heart without angina pectoris   (+) Other hyperlipidemia      ENDO   (+) Type 2 diabetes mellitus without complication, with long-term current use of insulin (HCC)      GI/HEPATIC   (+) Gastroesophageal reflux disease without esophagitis   (+) Malignant neoplasm of ascending colon (HCC)      /RENAL   (+) Chronic kidney disease (CKD) stage G2/A2, mildly decreased glomerular filtration rate (GFR) between 60-89 mL/min/1.73 square meter and albuminuria creatinine ratio between  mg/g      HEMATOLOGY   (+) Acute blood loss anemia   (+) Iron deficiency anemia due to chronic blood loss        Physical Exam    Airway    Mallampati score: II  TM Distance: >3 FB  Neck ROM: full     Dental       Cardiovascular      Pulmonary      Other Findings        Anesthesia Plan  ASA Score- 3     Anesthesia Type- IV sedation with anesthesia with ASA Monitors.         Additional Monitors:     Airway Plan:            Plan Factors-Exercise tolerance (METS): >4 METS.    Chart reviewed.              Obstructive sleep apnea risk education given perioperatively.        Induction- intravenous.    Postoperative Plan-     Perioperative Resuscitation Plan - Level 1 - Full Code.       Informed Consent- Anesthetic plan and risks discussed with patient.  I personally reviewed this patient with the CRNA. Discussed and agreed on the Anesthesia Plan with the CRNA..      NPO Status:  Vitals Value Taken Time   Date of last liquid 04/25/25 04/25/25 0651   Time of last liquid 0200 04/25/25 0651   Date of last solid 04/23/25 04/25/25 0651   Time of last solid 2100 04/25/25 0651       NPO appropriate. Discussed benefits/risks of monitored anesthetic care and discussed providing a dynamic level of mild to deep sedation. Risks include awareness, airway obstruction, aspiration which may necessitate conversion to general anesthesia.  All questions answered. Patient understands and wishes to proceed.    Anesthesia plan and consent discussed with Brent who expressed understanding and agreement. Risks/benefits and alternatives discussed with patient including possible PONV, sore throat, damage to teeth/lips/gums and possibility of rare anesthetic and surgical emergencies.

## 2025-04-25 NOTE — H&P
History and Physical - [facility]  Brent Hall 59 y.o. male MRN: [id number]                  HPI: Brent Hall is a 59 y.o. year old male who presents for history of colon polyps.      REVIEW OF SYSTEMS: Per the HPI, and otherwise unremarkable.    Historical Information   Past Medical History:   Diagnosis Date    Anemia     CAD (coronary artery disease)     Diabetes mellitus (HCC)     GERD (gastroesophageal reflux disease)     Hyperlipidemia     Obesity (BMI 30-39.9)      Past Surgical History:   Procedure Laterality Date    CARDIAC CATHETERIZATION Left 2023    Procedure: Cardiac Left Heart Cath;  Surgeon: Nacho Salinas DO;  Location: [facility];  Service: Cardiology    CARDIAC CATHETERIZATION N/A 2023    Procedure: Cardiac Coronary Angiogram;  Surgeon: Nacho Salinas DO;  Location: [facility];  Service: Cardiology    CARDIAC CATHETERIZATION  2023    Procedure: Cardiac catheterization;  Surgeon: Nacho Salinas DO;  Location: [facility];  Service: Cardiology    CT LAPAROSCOPY COLECTOMY PARTIAL W/ANASTOMOSIS N/A 2024    Procedure: RIGHT ROBOTIC HEMICOLECTOMY LAPAROSCOPIC W/ ROBOTICS;  Surgeon: Azam Carreon MD;  Location: [facility];  Service: Colorectal    ULNAR NERVE REPAIR Left     WRIST ARTHRODESIS Bilateral     2014     Social History   Social History     Substance and Sexual Activity   Alcohol Use Not Currently    Comment: very rare     Social History     Substance and Sexual Activity   Drug Use Never     Social History     Tobacco Use   Smoking Status Former    Current packs/day: 0.00    Types: Cigarettes    Quit date: 2010    Years since quittin.3    Passive exposure: Never   Smokeless Tobacco Never     Family History   Problem Relation Age of Onset    Diabetes Mother     Diabetes Father     Valvular heart disease Father     Atrial fibrillation Father     Diabetes Sister     Lung disease Sister   "   Heart failure Sister        Meds/Allergies       Current Outpatient Medications:     aspirin (ECOTRIN LOW STRENGTH) 81 mg EC tablet    atorvastatin (LIPITOR) 40 mg tablet    esomeprazole (NexIUM) 20 mg capsule    Alcohol Swabs 70 % PADS    Blood Glucose Monitoring Suppl (OneTouch Verio Reflect) w/Device KIT    glucose blood (OneTouch Verio) test strip    Insulin Pen Needle (BD Pen Needle Andreia 2nd Gen) 32G X 4 MM MISC    Insulin Pen Needle (BD Pen Needle Andreia 2nd Gen) 32G X 4 MM MISC    metFORMIN (GLUCOPHAGE-XR) 500 mg 24 hr tablet    nitroglycerin (NITROSTAT) 0.4 mg SL tablet    OneTouch Delica Lancets 33G MISC    Current Facility-Administered Medications:     lactated ringers infusion, 125 mL/hr, Intravenous, Continuous, 125 mL/hr at 04/25/25 0711    No Known Allergies    Objective     /77   Pulse 86   Temp 97.9 °F (36.6 °C) (Tympanic)   Resp 18   Ht 5' 10\" (1.778 m)   Wt 92.5 kg (204 lb)   SpO2 95%   BMI 29.27 kg/m²       PHYSICAL EXAM    Gen: NAD  Head: NCAT  CV: RRR  CHEST: Clear  ABD: soft, NT/ND  EXT: no edema      ASSESSMENT/PLAN:  This is a 59 y.o. year old male here for colonoscopy, and he is stable and optimized for his procedure.        "

## 2025-04-25 NOTE — ANESTHESIA POSTPROCEDURE EVALUATION
Post-Op Assessment Note    CV Status:  Stable    Pain management: adequate       Mental Status:  Alert and awake   Hydration Status:  Euvolemic   PONV Controlled:  Controlled   Airway Patency:  Patent     Post Op Vitals Reviewed: Yes    No anethesia notable event occurred.    Staff: Anesthesiologist, CRNA       Last Filed PACU Vitals:  Vitals Value Taken Time   Temp     Pulse 76 04/25/25 0819   BP     Resp     SpO2 95 % 04/25/25 0819   Vitals shown include unfiled device data.

## 2025-04-28 ENCOUNTER — PATIENT OUTREACH (OUTPATIENT)
Dept: HEMATOLOGY ONCOLOGY | Facility: CLINIC | Age: 60
End: 2025-04-28

## 2025-04-28 NOTE — PROGRESS NOTES
I reached out to Brent  to review for any barriers to care and to offer any supportive services that may be needed. I left VM with the reason for my call including my direct phone number .

## 2025-05-21 ENCOUNTER — OFFICE VISIT (OUTPATIENT)
Dept: ENDOCRINOLOGY | Facility: CLINIC | Age: 60
End: 2025-05-21
Payer: COMMERCIAL

## 2025-05-21 VITALS
TEMPERATURE: 97 F | HEART RATE: 74 BPM | WEIGHT: 214 LBS | SYSTOLIC BLOOD PRESSURE: 124 MMHG | DIASTOLIC BLOOD PRESSURE: 84 MMHG | OXYGEN SATURATION: 96 % | HEIGHT: 70 IN | BODY MASS INDEX: 30.64 KG/M2

## 2025-05-21 DIAGNOSIS — E78.2 MIXED HYPERLIPIDEMIA: ICD-10-CM

## 2025-05-21 DIAGNOSIS — I10 HYPERTENSION, UNSPECIFIED TYPE: ICD-10-CM

## 2025-05-21 DIAGNOSIS — Z79.4 TYPE 2 DIABETES MELLITUS WITH DIABETIC PERIPHERAL ANGIOPATHY WITHOUT GANGRENE, WITH LONG-TERM CURRENT USE OF INSULIN (HCC): Primary | ICD-10-CM

## 2025-05-21 DIAGNOSIS — E11.51 TYPE 2 DIABETES MELLITUS WITH DIABETIC PERIPHERAL ANGIOPATHY WITHOUT GANGRENE, WITH LONG-TERM CURRENT USE OF INSULIN (HCC): Primary | ICD-10-CM

## 2025-05-21 LAB — SL AMB POCT HEMOGLOBIN AIC: 8.5 (ref ?–6.5)

## 2025-05-21 PROCEDURE — 83036 HEMOGLOBIN GLYCOSYLATED A1C: CPT | Performed by: STUDENT IN AN ORGANIZED HEALTH CARE EDUCATION/TRAINING PROGRAM

## 2025-05-21 PROCEDURE — 99214 OFFICE O/P EST MOD 30 MIN: CPT | Performed by: STUDENT IN AN ORGANIZED HEALTH CARE EDUCATION/TRAINING PROGRAM

## 2025-05-21 RX ORDER — TIRZEPATIDE 2.5 MG/.5ML
2.5 INJECTION, SOLUTION SUBCUTANEOUS WEEKLY
Qty: 2 ML | Refills: 0 | Status: SHIPPED | OUTPATIENT
Start: 2025-05-21

## 2025-05-21 NOTE — PROGRESS NOTES
Name: Brent Hall      : 1965      MRN: [id number]  Encounter Provider: Ulises Lynne DO  Encounter Date: 2025   Encounter department: [facility]    No chief complaint on file.  :  Assessment & Plan  Type 2 diabetes mellitus with diabetic peripheral angiopathy without gangrene, with long-term current use of insulin (HCC)  His A1c level has increased to 8.5%. He has been off Lantus for a long time and has not been testing his blood sugar frequently. The potential benefits and risks of various medications, including GLP1 and SGLT2 inhibitors, were discussed. He expressed a preference for medications with minimal side effects. A prescription for Mounjaro 2.5 mg will be provided, with instructions to administer it once weekly. He will continue his current metformin regimen. If he tolerates the Mounjaro well, he will request a refill and inform us of his progress, at which point the dosage may be adjusted to a maintenance level.    Follow-up: The patient will follow up in 4 months.    Lab Results   Component Value Date    HGBA1C 8.5 (A) 2025       Orders:  •  POCT hemoglobin A1c  •  Tirzepatide (Mounjaro) 2.5 MG/0.5ML SOAJ; Inject 2.5 mg under the skin once a week  •  Basic metabolic panel; Future  •  Hemoglobin A1C; Future    Hypertension, unspecified type         Mixed hyperlipidemia             History of Present Illness   History of Present Illness  The patient is a 59-year-old male here for a follow-up of type 2 diabetes, which is complicated by a history of myocardial infarction. He also has a history of adenocarcinoma of the right hemicolon, for which he underwent a hemicolectomy.    At his last visit in 2025, he admitted to intermittent use of Lantus, reporting 3 to 4 times weekly dosing, minimally used at 10 units, which was determined likely ineffective. Continuation of monotherapy with metformin was pursued. He reports no recent  "health issues or concerns. He has not been monitoring his blood glucose levels and has discontinued the use of Lantus. His lifestyle, including diet, sleep, and physical activity, remains stable.    He has not undergone an eye examination recently but has one scheduled with his primary care physician. He reports no foot-related issues and describes his feet as highly sensitive. He acknowledges occasional dietary indiscretions despite generally maintaining a healthy diet. He expresses a preference for medications with minimal side effects and is open to trying injectable therapies. He reports no history of pancreatitis. No family history of MTC/MEN syndrome.     Past Surgical History: The patient has undergone a hemicolectomy for adenocarcinoma of the right hemicolon.      Last Eye Exam: Not on file  Last Foot Exam: 02/16/2024  Health Maintenance   Topic Date Due   • Diabetic Eye Exam  Never done   • Diabetic Foot Exam  05/21/2026     Pertinent Medical History         Review of Systems as per [facility]         Medical History Reviewed by provider this encounter:  Tobacco  Allergies  Meds  Problems  Med Hx  Surg Hx  Fam Hx     .    Objective   /84 (BP Location: Left arm, Patient Position: Sitting)   Pulse 74   Temp (!) 97 °F (36.1 °C)   Ht 5' 10\" (1.778 m)   Wt 97.1 kg (214 lb)   SpO2 96%   BMI 30.71 kg/m²      Body mass index is 30.71 kg/m².  Wt Readings from Last 3 Encounters:   05/21/25 97.1 kg (214 lb)   04/25/25 92.5 kg (204 lb)   02/28/25 98.9 kg (218 lb)     Physical Exam  Sensation intact in bilateral feet.  Slight hammertoe noted on the left second toe.  Physical Exam  Vitals reviewed.   Constitutional:       General: He is not in acute distress.     Appearance: Normal appearance.   HENT:      Head: Normocephalic and atraumatic.      Nose: Nose normal.     Eyes:      General: No scleral icterus.     Conjunctiva/sclera: Conjunctivae normal.       Cardiovascular:      Pulses: no weak pulses.        "    Dorsalis pedis pulses are 2+ on the right side and 2+ on the left side.   Pulmonary:      Effort: Pulmonary effort is normal. No respiratory distress.     Musculoskeletal:         General: No deformity.      Cervical back: Normal range of motion.   Feet:      Right foot:      Skin integrity: No ulcer, skin breakdown, erythema, warmth, callus or dry skin.      Left foot:      Skin integrity: No ulcer, skin breakdown, erythema, warmth, callus or dry skin.     Skin:     General: Skin is warm and dry.     Neurological:      Mental Status: He is alert.     Psychiatric:         Mood and Affect: Mood normal.         Behavior: Behavior normal.     Patient's shoes and socks removed.    Right Foot/Ankle   Right Foot Inspection  Skin Exam: skin normal and skin intact. No dry skin, no warmth, no callus, no erythema, no maceration, no abnormal color, no pre-ulcer, no ulcer and no callus.     Toe Exam: ROM and strength within normal limits.     Sensory   Vibration: intact  Monofilament testing: intact    Vascular  The right DP pulse is 2+.     Left Foot/Ankle  Left Foot Inspection  Skin Exam: skin normal and skin intact. No dry skin, no warmth, no erythema, no maceration, normal color, no pre-ulcer, no ulcer and no callus.     Toe Exam: ROM and strength within normal limits and left toe deformity (hammer deformity left 2nd toe).     Sensory   Vibration: intact  Monofilament testing: intact    Vascular  The left DP pulse is 2+.     Assign Risk Category  No deformity present  No loss of protective sensation  No weak pulses  Risk: 0    Results  Labs:  A1c: 8.5%  Labs:   Lab Results   Component Value Date    HGBA1C 8.5 (A) 05/21/2025    HGBA1C 8.0 (H) 01/14/2025    HGBA1C 7.8 (H) 08/23/2024     Lab Results   Component Value Date    CREATININE 1.08 01/14/2025    CREATININE 1.38 (H) 10/26/2024    CREATININE 0.98 08/23/2024    BUN 17 01/14/2025    K 4.7 01/14/2025     01/14/2025    CO2 28 01/14/2025     GFR, Calculated   Date  "Value Ref Range Status   07/30/2020 74 >60 mL/min/1.73m2 Final     Comment:     mL/min per 1.73 square meters                                            Normal Function or Mild Renal    Disease (if clinically at risk):  >or=60  Moderately Decreased:                30-59  Severely Decreased:                  15-29  Renal Failure:                         <15                                            -American GFR: multiply reported GFR by 1.16    Please note that the eGFR is based on the CKD-EPI calculation, and is not intended to be used for drug dosing.                                            Note: Calculated GFR may not be an accurate indicator of renal function if the patient's renal function is not in a steady state.     eGFR   Date Value Ref Range Status   01/14/2025 74 ml/min/1.73sq m Final     Lab Results   Component Value Date    HDL 39 (L) 01/14/2025    TRIG 95 01/14/2025     Lab Results   Component Value Date    ALT 15 10/26/2024    AST 11 (L) 10/26/2024    ALKPHOS 58 10/26/2024     No results found for: \"[id number]\"    There are no Patient Instructions on file for this visit.    Discussed with the patient and all questioned fully answered. He will call me if any problems arise.      " DISPLAY PLAN FREE TEXT

## 2025-06-30 ENCOUNTER — TELEPHONE (OUTPATIENT)
Age: 60
End: 2025-06-30

## 2025-06-30 NOTE — TELEPHONE ENCOUNTER
Left PT message advising 12/2/25 appt location changed from [place] office to [facility] 8th [facility] [place].

## 2025-08-05 DIAGNOSIS — E11.51 TYPE 2 DIABETES MELLITUS WITH DIABETIC PERIPHERAL ANGIOPATHY WITHOUT GANGRENE, WITH LONG-TERM CURRENT USE OF INSULIN (HCC): ICD-10-CM

## 2025-08-05 DIAGNOSIS — Z79.4 TYPE 2 DIABETES MELLITUS WITH DIABETIC PERIPHERAL ANGIOPATHY WITHOUT GANGRENE, WITH LONG-TERM CURRENT USE OF INSULIN (HCC): ICD-10-CM

## 2025-08-06 RX ORDER — TIRZEPATIDE 2.5 MG/.5ML
2.5 INJECTION, SOLUTION SUBCUTANEOUS WEEKLY
Qty: 2 ML | Refills: 1 | Status: SHIPPED | OUTPATIENT
Start: 2025-08-06

## (undated) DEVICE — VESSEL SEALER EXTEND: Brand: ENDOWRIST

## (undated) DEVICE — BETHLEHEM MAJOR GENERAL PACK: Brand: CARDINAL HEALTH

## (undated) DEVICE — GLOVE INDICATOR PI UNDERGLOVE SZ 8.5 BLUE

## (undated) DEVICE — DRAPE SURGIKIT SADDLE BAG LAP

## (undated) DEVICE — EXOFIN PRECISION PEN HIGH VISCOSITY TOPICAL SKIN ADHESIVE: Brand: EXOFIN PRECISION PEN, 1G

## (undated) DEVICE — VISUALIZATION SYSTEM: Brand: CLEARIFY

## (undated) DEVICE — TRAY FOLEY 16FR URIMETER SURESTEP

## (undated) DEVICE — WOUND RETRACTOR AND PROTECTOR: Brand: ALEXIS O WOUND PROTECTOR-RETRACTOR

## (undated) DEVICE — SUT MONOCRYL 4-0 PS-2 18 IN Y496G

## (undated) DEVICE — ANTIBACTERIAL UNDYED BRAIDED (POLYGLACTIN 910), SYNTHETIC ABSORBABLE SUTURE: Brand: COATED VICRYL

## (undated) DEVICE — MONOPOLAR CURVED SCISSORS: Brand: ENDOWRIST

## (undated) DEVICE — RADIFOCUS OPTITORQUE ANGIOGRAPHIC CATHETER: Brand: OPTITORQUE

## (undated) DEVICE — SUT VICRYL 0 REEL 54 IN J287G

## (undated) DEVICE — ARM DRAPE

## (undated) DEVICE — GOWN,SLEEVE,STERILE,W/CSR WRAP,1/P: Brand: MEDLINE

## (undated) DEVICE — GLOVE SRG BIOGEL 8

## (undated) DEVICE — CLAMP TOWEL TUBING DISPOSABLE

## (undated) DEVICE — BASIN EMESIS 700CC BLUE 24/CS 336/PLT: Brand: MEDEGEN MEDICAL PRODUCTS, LLC

## (undated) DEVICE — VIOLET BRAIDED (POLYGLACTIN 910), SYNTHETIC ABSORBABLE SUTURE: Brand: COATED VICRYL

## (undated) DEVICE — PROXIMATE RELOADABLE LINEAR STAPLER, 60MM: Brand: PROXIMATE

## (undated) DEVICE — FENESTRATED BIPOLAR FORCEPS: Brand: ENDOWRIST

## (undated) DEVICE — BLADELESS OBTURATOR: Brand: WECK VISTA

## (undated) DEVICE — GLIDESHEATH BASIC HYDROPHILIC COATED INTRODUCER SHEATH: Brand: GLIDESHEATH

## (undated) DEVICE — COLUMN DRAPE

## (undated) DEVICE — TOWEL SURG XR DETECT GREEN STRL RFD

## (undated) DEVICE — INTENDED FOR TISSUE SEPARATION, AND OTHER PROCEDURES THAT REQUIRE A SHARP SURGICAL BLADE TO PUNCTURE OR CUT.: Brand: BARD-PARKER SAFETY BLADES SIZE 15, STERILE

## (undated) DEVICE — 3M™ IOBAN™ 2 ANTIMICROBIAL INCISE DRAPE 6650EZ: Brand: IOBAN™ 2

## (undated) DEVICE — SUT PDS PLUS 1 CTX 36IN PDP371T

## (undated) DEVICE — 40601 PROLONGED POSITIONING SYSTEM: Brand: 40601 PROLONGED POSITIONING SYSTEM

## (undated) DEVICE — PROXIMATE LINEAR STAPLER RELOADS: Brand: PROXIMATE

## (undated) DEVICE — INSUFLATION TUBING INSUFLOW (LEXION)

## (undated) DEVICE — PAD GROUNDING DUAL ADULT

## (undated) DEVICE — DRAPE SHEET X-LG

## (undated) DEVICE — DGW .035 FC J3MM 260CM TEF: Brand: EMERALD

## (undated) DEVICE — TROCAR: Brand: KII SLEEVE

## (undated) DEVICE — TUBING SUCTION 5MM X 12 FT

## (undated) DEVICE — TUBING SMOKE EVAC W/FILTRATION DEVICE PLUMEPORT ACTIV

## (undated) DEVICE — 2, DISPOSABLE SUCTION/IRRIGATOR WITHOUT DISPOSABLE TIP: Brand: STRYKEFLOW

## (undated) DEVICE — CANNULA SEAL

## (undated) DEVICE — CATH DIAG 5FR IMPULSE 110CM 6S PIG 145 ANG

## (undated) DEVICE — ELECTRODE BLADE MOD  E-Z CLEAN 6.5IN -0014M

## (undated) DEVICE — PROXIMATE RELOADABLE LINEAR CUTTER WITH SAFETY LOCK-OUT, 100MM: Brand: PROXIMATE

## (undated) DEVICE — GUIDEWIRE WHOLEY HI TORQUE INTERM MOD J .035 145CM

## (undated) DEVICE — TIP-UP FENESTRATED GRASPER: Brand: ENDOWRIST

## (undated) DEVICE — ADHESIVE SKIN CLOSURE SYS EXOFIN FUSION 22CM

## (undated) DEVICE — MEDI-VAC YANK SUCT HNDL W/TPRD BULBOUS TIP: Brand: CARDINAL HEALTH

## (undated) DEVICE — PLUMEPEN PRO 10FT

## (undated) DEVICE — ELECTRO LUBE IS A SINGLE PATIENT USE DEVICE THAT IS INTENDED TO BE USED ON ELECTROSURGICAL ELECTRODES TO REDUCE STICKING.: Brand: KEY SURGICAL ELECTRO LUBE

## (undated) DEVICE — 3000CC GUARDIAN II: Brand: GUARDIAN

## (undated) DEVICE — POOLE SUCTION HANDLE: Brand: CARDINAL HEALTH